# Patient Record
Sex: FEMALE | Race: WHITE | NOT HISPANIC OR LATINO | Employment: OTHER | URBAN - METROPOLITAN AREA
[De-identification: names, ages, dates, MRNs, and addresses within clinical notes are randomized per-mention and may not be internally consistent; named-entity substitution may affect disease eponyms.]

---

## 2023-05-22 LAB
ALANINE AMINOTRANSFERASE (SGPT) (U/L) IN SER/PLAS: 22 U/L (ref 7–45)
ALBUMIN (G/DL) IN SER/PLAS: 4.2 G/DL (ref 3.4–5)
ALKALINE PHOSPHATASE (U/L) IN SER/PLAS: 59 U/L (ref 33–136)
AMYLASE (U/L) IN SER/PLAS: 28 U/L (ref 29–103)
ANION GAP IN SER/PLAS: 12 MMOL/L (ref 10–20)
ASPARTATE AMINOTRANSFERASE (SGOT) (U/L) IN SER/PLAS: 18 U/L (ref 9–39)
BILIRUBIN TOTAL (MG/DL) IN SER/PLAS: 0.3 MG/DL (ref 0–1.2)
CALCIUM (MG/DL) IN SER/PLAS: 9.3 MG/DL (ref 8.6–10.3)
CARBON DIOXIDE, TOTAL (MMOL/L) IN SER/PLAS: 28 MMOL/L (ref 21–32)
CHLORIDE (MMOL/L) IN SER/PLAS: 104 MMOL/L (ref 98–107)
CREATININE (MG/DL) IN SER/PLAS: 0.56 MG/DL (ref 0.5–1.05)
ERYTHROCYTE DISTRIBUTION WIDTH (RATIO) BY AUTOMATED COUNT: 14.6 % (ref 11.5–14.5)
ERYTHROCYTE MEAN CORPUSCULAR HEMOGLOBIN CONCENTRATION (G/DL) BY AUTOMATED: 31.4 G/DL (ref 32–36)
ERYTHROCYTE MEAN CORPUSCULAR VOLUME (FL) BY AUTOMATED COUNT: 95 FL (ref 80–100)
ERYTHROCYTES (10*6/UL) IN BLOOD BY AUTOMATED COUNT: 4.21 X10E12/L (ref 4–5.2)
GFR FEMALE: >90 ML/MIN/1.73M2
GLUCOSE (MG/DL) IN SER/PLAS: 97 MG/DL (ref 74–99)
HEMATOCRIT (%) IN BLOOD BY AUTOMATED COUNT: 40.1 % (ref 36–46)
HEMOGLOBIN (G/DL) IN BLOOD: 12.6 G/DL (ref 12–16)
LEUKOCYTES (10*3/UL) IN BLOOD BY AUTOMATED COUNT: 6.1 X10E9/L (ref 4.4–11.3)
LIPASE (U/L) IN SER/PLAS: 29 U/L (ref 9–82)
PLATELETS (10*3/UL) IN BLOOD AUTOMATED COUNT: 305 X10E9/L (ref 150–450)
POTASSIUM (MMOL/L) IN SER/PLAS: 4.2 MMOL/L (ref 3.5–5.3)
PROTEIN TOTAL: 6.5 G/DL (ref 6.4–8.2)
SODIUM (MMOL/L) IN SER/PLAS: 140 MMOL/L (ref 136–145)
UREA NITROGEN (MG/DL) IN SER/PLAS: 19 MG/DL (ref 6–23)

## 2023-09-03 PROBLEM — E03.9 HYPOTHYROIDISM: Status: ACTIVE | Noted: 2023-09-03

## 2023-09-03 PROBLEM — K62.5 BRBPR (BRIGHT RED BLOOD PER RECTUM): Status: ACTIVE | Noted: 2023-09-03

## 2023-09-03 PROBLEM — D72.829 LEUKOCYTOSIS: Status: ACTIVE | Noted: 2023-09-03

## 2023-09-03 PROBLEM — J45.909 ASTHMA WITHOUT STATUS ASTHMATICUS (HHS-HCC): Status: ACTIVE | Noted: 2023-09-03

## 2023-09-03 PROBLEM — R12 HEARTBURN: Status: ACTIVE | Noted: 2023-09-03

## 2023-09-03 PROBLEM — K21.9 GERD (GASTROESOPHAGEAL REFLUX DISEASE): Status: ACTIVE | Noted: 2023-09-03

## 2023-09-03 PROBLEM — R13.10 DYSPHAGIA: Status: ACTIVE | Noted: 2023-09-03

## 2023-09-03 PROBLEM — D12.6 COLON ADENOMAS: Status: ACTIVE | Noted: 2023-09-03

## 2023-09-03 PROBLEM — E66.3 OVERWEIGHT: Status: ACTIVE | Noted: 2023-09-03

## 2023-09-03 PROBLEM — B37.0 ORAL THRUSH: Status: ACTIVE | Noted: 2023-09-03

## 2023-09-03 PROBLEM — M76.60 ACHILLES TENDONITIS: Status: ACTIVE | Noted: 2023-09-03

## 2023-09-03 PROBLEM — R53.83 FATIGUE: Status: ACTIVE | Noted: 2023-09-03

## 2023-09-03 PROBLEM — R50.9 PYREXIA: Status: ACTIVE | Noted: 2023-09-03

## 2023-09-03 PROBLEM — G47.33 OBSTRUCTIVE SLEEP APNEA SYNDROME: Status: ACTIVE | Noted: 2023-09-03

## 2023-09-03 RX ORDER — IBUPROFEN 200 MG
200 TABLET ORAL EVERY 6 HOURS
COMMUNITY

## 2023-09-03 RX ORDER — DESONIDE 0.5 MG/G
CREAM TOPICAL
COMMUNITY
End: 2024-03-06 | Stop reason: WASHOUT

## 2023-09-03 RX ORDER — ASCORBIC ACID 500 MG
1 TABLET ORAL DAILY
COMMUNITY

## 2023-09-03 RX ORDER — GLUCOSAM/CHONDRO/HERB 149/HYAL 750-100 MG
2 TABLET ORAL DAILY
COMMUNITY
End: 2024-03-06 | Stop reason: WASHOUT

## 2023-09-03 RX ORDER — MAGNESIUM OXIDE/MAG AA CHELATE 300 MG
1 CAPSULE ORAL DAILY
COMMUNITY

## 2023-09-03 RX ORDER — FLUTICASONE PROPIONATE AND SALMETEROL XINAFOATE 115; 21 UG/1; UG/1
2 AEROSOL, METERED RESPIRATORY (INHALATION)
COMMUNITY
End: 2023-11-20 | Stop reason: ALTCHOICE

## 2023-09-03 RX ORDER — MULTIVITAMIN
1 TABLET ORAL DAILY
COMMUNITY
End: 2023-11-20 | Stop reason: ALTCHOICE

## 2023-09-03 RX ORDER — VIT C/E/ZN/COPPR/LUTEIN/ZEAXAN 250MG-90MG
1 CAPSULE ORAL DAILY
COMMUNITY

## 2023-09-03 RX ORDER — ESTRADIOL 0.1 MG/G
2 CREAM VAGINAL
COMMUNITY
End: 2023-11-20 | Stop reason: ALTCHOICE

## 2023-09-03 RX ORDER — FLUTICASONE PROPIONATE 50 MCG
2 SPRAY, SUSPENSION (ML) NASAL DAILY
COMMUNITY

## 2023-09-03 RX ORDER — SELENIUM 50 MCG
1 TABLET ORAL DAILY
COMMUNITY
End: 2023-11-20 | Stop reason: ALTCHOICE

## 2023-09-03 RX ORDER — HYDROGEN PEROXIDE 3 %
1 SOLUTION, NON-ORAL MISCELLANEOUS DAILY
COMMUNITY
End: 2023-11-20 | Stop reason: ALTCHOICE

## 2023-09-03 RX ORDER — ZINC SULFATE 50(220)MG
1 CAPSULE ORAL DAILY
COMMUNITY
End: 2024-03-06 | Stop reason: WASHOUT

## 2023-10-03 ENCOUNTER — APPOINTMENT (OUTPATIENT)
Dept: ALLERGY | Facility: CLINIC | Age: 68
End: 2023-10-03
Payer: MEDICARE

## 2023-10-09 ENCOUNTER — TELEPHONE (OUTPATIENT)
Dept: PRIMARY CARE | Facility: CLINIC | Age: 68
End: 2023-10-09
Payer: MEDICARE

## 2023-10-09 DIAGNOSIS — J40 BRONCHITIS: Primary | ICD-10-CM

## 2023-10-09 RX ORDER — AMOXICILLIN 500 MG/1
500 CAPSULE ORAL EVERY 12 HOURS SCHEDULED
Qty: 14 CAPSULE | Refills: 0 | Status: SHIPPED | OUTPATIENT
Start: 2023-10-09 | End: 2023-11-20 | Stop reason: ALTCHOICE

## 2023-10-09 NOTE — TELEPHONE ENCOUNTER
Went to urgent care in [place] yesterday for chest congestion.  Prescribed doxycycline and steroid.  She is stating that she does not want to take doxycycline and is requesting that doc sends in her a prescription for amoxicillin instead.  She uses [facility] in [place].  Was hoping that Dr Novak would also look over the notes from urgent care.  She is not satisfied with the care she received.

## 2023-10-10 ENCOUNTER — APPOINTMENT (OUTPATIENT)
Dept: PULMONOLOGY | Facility: CLINIC | Age: 68
End: 2023-10-10
Payer: COMMERCIAL

## 2023-10-12 ENCOUNTER — TELEPHONE (OUTPATIENT)
Dept: PRIMARY CARE | Facility: CLINIC | Age: 68
End: 2023-10-12
Payer: MEDICARE

## 2023-10-12 RX ORDER — DOXYCYCLINE 100 MG/1
100 CAPSULE ORAL 2 TIMES DAILY
Qty: 14 CAPSULE | Refills: 0 | Status: SHIPPED | OUTPATIENT
Start: 2023-10-12 | End: 2023-10-19

## 2023-10-12 NOTE — TELEPHONE ENCOUNTER
Patient is calling stating taking amoxicillin a few days ago and she is still not feeling well.  Mucus is turning green.

## 2023-10-17 ENCOUNTER — APPOINTMENT (OUTPATIENT)
Dept: ALLERGY | Facility: CLINIC | Age: 68
End: 2023-10-17
Payer: MEDICARE

## 2023-10-17 ENCOUNTER — OFFICE VISIT (OUTPATIENT)
Dept: PRIMARY CARE | Facility: CLINIC | Age: 68
End: 2023-10-17
Payer: MEDICARE

## 2023-10-17 VITALS
HEART RATE: 90 BPM | SYSTOLIC BLOOD PRESSURE: 141 MMHG | HEIGHT: 63 IN | WEIGHT: 166.4 LBS | TEMPERATURE: 98 F | OXYGEN SATURATION: 97 % | BODY MASS INDEX: 29.48 KG/M2 | DIASTOLIC BLOOD PRESSURE: 84 MMHG

## 2023-10-17 DIAGNOSIS — E78.00 SERUM CHOLESTEROL ELEVATED: ICD-10-CM

## 2023-10-17 DIAGNOSIS — R73.01 ELEVATED FASTING GLUCOSE: ICD-10-CM

## 2023-10-17 DIAGNOSIS — R53.83 FATIGUE, UNSPECIFIED TYPE: Primary | ICD-10-CM

## 2023-10-17 DIAGNOSIS — M85.80 OTHER SPECIFIED DISORDERS OF BONE DENSITY AND STRUCTURE, UNSPECIFIED SITE: ICD-10-CM

## 2023-10-17 DIAGNOSIS — G47.30 SLEEP APNEA IN ADULT: ICD-10-CM

## 2023-10-17 DIAGNOSIS — R05.2 SUBACUTE COUGH: ICD-10-CM

## 2023-10-17 PROCEDURE — 1036F TOBACCO NON-USER: CPT | Performed by: FAMILY MEDICINE

## 2023-10-17 PROCEDURE — 1159F MED LIST DOCD IN RCRD: CPT | Performed by: FAMILY MEDICINE

## 2023-10-17 PROCEDURE — 99214 OFFICE O/P EST MOD 30 MIN: CPT | Performed by: FAMILY MEDICINE

## 2023-10-17 RX ORDER — TRETINOIN 0.5 MG/G
CREAM TOPICAL
COMMUNITY
Start: 2023-08-24

## 2023-10-17 RX ORDER — PANTOPRAZOLE SODIUM 40 MG/1
TABLET, DELAYED RELEASE ORAL
COMMUNITY
End: 2023-10-20

## 2023-10-17 RX ORDER — EPINEPHRINE 0.3 MG/.3ML
INJECTION SUBCUTANEOUS
COMMUNITY
Start: 2023-09-19

## 2023-10-17 RX ORDER — UREA 40 G/100G
LOTION TOPICAL
COMMUNITY
Start: 2023-04-06

## 2023-10-17 ASSESSMENT — ENCOUNTER SYMPTOMS
SINUS PAIN: 0
PALPITATIONS: 0
RHINORRHEA: 0
BLOOD IN STOOL: 1
FREQUENCY: 1
ARTHRALGIAS: 1
ACTIVITY CHANGE: 1
FATIGUE: 1
COUGH: 1
SORE THROAT: 0
JOINT SWELLING: 1
CHILLS: 1
ABDOMINAL PAIN: 0
WHEEZING: 0
FEVER: 0

## 2023-10-17 NOTE — PROGRESS NOTES
Subjective     Patient ID: Jenn Antunez is a 68 y.o. female who presents for Annual Exam (physical), Cough, and Asthma (Respiratory infection went to urgent care has one doxycycline pill left, sees dr prater for allergy shots, bad cough, lots of wheezing, sleeping 9-10  hrs a day, sick for a couple weeks, tired last 6 months).  Cough  Associated symptoms include chills. Pertinent negatives include no ear pain, fever, rhinorrhea, sore throat or wheezing. Her past medical history is significant for asthma and environmental allergies.   Asthma  She complains of cough. There is no wheezing. Pertinent negatives include no ear pain, fever, rhinorrhea, sneezing or sore throat. Her past medical history is significant for asthma.     Recent illness, went to urgent care on 10/9/23.  Did not take the doxycycline and prednisone prescribed to her from urgent care.Followed up with us, given amoxicillin 500mg for bronchitis, which did not relieve her symptoms. Prescribed doxycycline without relief of symptoms.  Coughing violently, phlegm has been yellow. Today has been white. Woke up sore all over. Sleeping 9-10 hours. Feels refreshed when waking up, and then gets tired within an hour. Taking naps for the fatigue. No CXR. Taking ibuprofen for muscle soreness and chills daily.   Followed by pulmonology and alg/imm. Received first allergy shot on 2023 from Dr. Gates. Has had x2 from him. Previously had them for 3 years and helped significantly. Typically only gets ill x1 per year since her allergy shots.  Started as sinus congestion and then went to chest, prone to PNA.   Complaining of 6 months of fatigue. Did recently retire in April this year.Retired in April. Pt has history of AILYN, but does not use CPAP. Explains that the machine is too noisy.     Other complaints:   Bladder and bowel leakage, occurs when sneezing and coughing. A1  1st MCP joint on L more painful in the morning.   Dietician  "referral    Medical History:  Asthma  AILYN without CPAP    Meds  Advair bid  Albuterol prn  Desonide .05% cream  Estradiol 0.1mg/gm cream  Fluticasone 50mcg nasal suspension  PEG oral solution  Potassium  Vitamin C  Vitamin D-3 25mcg  Zinc  Magnesium    Social History:  Smoking -none  Alcohol- x1 glass per month    Review of Systems   Constitutional:  Positive for activity change, chills and fatigue. Negative for fever.   HENT:  Negative for congestion, ear pain, rhinorrhea, sinus pain, sneezing and sore throat.    Respiratory:  Positive for cough. Negative for wheezing.    Cardiovascular:  Negative for palpitations.   Gastrointestinal:  Positive for blood in stool. Negative for abdominal pain.   Genitourinary:  Positive for frequency and urgency.   Musculoskeletal:  Positive for arthralgias and joint swelling.   Allergic/Immunologic: Positive for environmental allergies.   All other systems reviewed and are negative.      Objective     Physical Exam  /84   Pulse 90   Temp 36.7 °C (98 °F)   Ht 1.6 m (5' 3\")   Wt 75.5 kg (166 lb 6.4 oz)   SpO2 97%   BMI 29.48 kg/m²    138/78 in room    General: Pt is sitting up in chair. Appears well-nourished. Coughing in room.  HENT: TM clear bilaterally. No nasal edema or congestion. Oropharynx without erythema or exudate. Mallampati level III.   Neck: No cervical lymphadenopathy. No thyromegaly  CV: S1S2 normal. Regular rate and rhythm. Cap refill <2 seconds.  Resp: Clear to auscultation in all lobes. No rales, rhonchi, or wheezes. No clubbing.   Extremities: No lower extremity edema bilaterally.   Skin: Warm and dry. No rashes or bruising.   Psych: Appropriate responses and actions.     Lab Results   Component Value Date    WBC 6.1 05/22/2023    HGB 12.6 05/22/2023    HCT 40.1 05/22/2023    MCV 95 05/22/2023     05/22/2023      Lab Results   Component Value Date    GLUCOSE 97 05/22/2023    CALCIUM 9.3 05/22/2023     05/22/2023    K 4.2 05/22/2023    CO2 " "28 05/22/2023     05/22/2023    BUN 19 05/22/2023    CREATININE 0.56 05/22/2023     Lab Results   Component Value Date    CHOL 329 (H) 03/21/2023    CHOL 246 (H) 04/05/2022    CHOL 237 (H) 07/29/2021     Lab Results   Component Value Date    HDL 64 03/21/2023    HDL 52 04/05/2022    HDL 54.9 07/29/2021     Lab Results   Component Value Date    LDLCALC 248 (H) 03/21/2023    LDLCALC 168 (H) 04/05/2022    LDLCALC 150 (H) 06/16/2018     Lab Results   Component Value Date    TRIG 86 03/21/2023    TRIG 128 04/05/2022    TRIG 163 (H) 07/29/2021     No components found for: \"CHOLHDL\"     Assessment:  68 y.o. female with a history of asthma presenting for cough and fatigue.  R/o pneumonia  Fatigue workup  History of elevated cholesterol levels and fasting glucose  Obstructive sleep apnea    Plan:  CBC, CXR  TSH, vitamin D  Lipid panel, HbA1c  Steroid pack after blood drawn, continue inhalers  Sleep medicine consult recommended for AILNY. Will follow up with pt about a consult after results obtained.    Lilliam Spann, Student OMS III    I reviewed and examined the patient. I was present for the key exam elements, and I fully participated in the patient's care. I discussed the management of care with the student. I have personally reviewed the pertinent labs and imaging, as well as recent notes, with the patient. I have reviewed the note above and agree with the student's medical decision making as documented. Any adjustments to the plan were altered in the body of the note.     Chantal Em, DO  [facility] Family Medicine PGY-2    "

## 2023-10-18 ENCOUNTER — LAB (OUTPATIENT)
Dept: LAB | Facility: LAB | Age: 68
End: 2023-10-18
Payer: MEDICARE

## 2023-10-18 ENCOUNTER — HOSPITAL ENCOUNTER (OUTPATIENT)
Dept: RADIOLOGY | Facility: HOSPITAL | Age: 68
Discharge: HOME | End: 2023-10-18
Payer: MEDICARE

## 2023-10-18 DIAGNOSIS — R05.2 SUBACUTE COUGH: ICD-10-CM

## 2023-10-18 DIAGNOSIS — M85.80 OTHER SPECIFIED DISORDERS OF BONE DENSITY AND STRUCTURE, UNSPECIFIED SITE: ICD-10-CM

## 2023-10-18 DIAGNOSIS — E78.00 SERUM CHOLESTEROL ELEVATED: ICD-10-CM

## 2023-10-18 DIAGNOSIS — R53.83 FATIGUE, UNSPECIFIED TYPE: ICD-10-CM

## 2023-10-18 DIAGNOSIS — R73.01 ELEVATED FASTING GLUCOSE: ICD-10-CM

## 2023-10-18 LAB
25(OH)D3 SERPL-MCNC: 47 NG/ML (ref 30–100)
CHOLEST SERPL-MCNC: 181 MG/DL (ref 0–199)
CHOLESTEROL/HDL RATIO: 4
ERYTHROCYTE [DISTWIDTH] IN BLOOD BY AUTOMATED COUNT: 14 % (ref 11.5–14.5)
EST. AVERAGE GLUCOSE BLD GHB EST-MCNC: 117 MG/DL
HBA1C MFR BLD: 5.7 %
HCT VFR BLD AUTO: 39.1 % (ref 36–46)
HDLC SERPL-MCNC: 45.1 MG/DL
HGB BLD-MCNC: 12.6 G/DL (ref 12–16)
LDLC SERPL CALC-MCNC: 108 MG/DL
MCH RBC QN AUTO: 30.2 PG (ref 26–34)
MCHC RBC AUTO-ENTMCNC: 32.2 G/DL (ref 32–36)
MCV RBC AUTO: 94 FL (ref 80–100)
NON HDL CHOLESTEROL: 136 MG/DL (ref 0–149)
NRBC BLD-RTO: 0 /100 WBCS (ref 0–0)
PLATELET # BLD AUTO: 279 X10*3/UL (ref 150–450)
PMV BLD AUTO: 11.3 FL (ref 7.5–11.5)
RBC # BLD AUTO: 4.17 X10*6/UL (ref 4–5.2)
TRIGL SERPL-MCNC: 139 MG/DL (ref 0–149)
TSH SERPL-ACNC: 1.73 MIU/L (ref 0.44–3.98)
VLDL: 28 MG/DL (ref 0–40)
WBC # BLD AUTO: 6.5 X10*3/UL (ref 4.4–11.3)

## 2023-10-18 PROCEDURE — 84443 ASSAY THYROID STIM HORMONE: CPT

## 2023-10-18 PROCEDURE — 95165 ANTIGEN THERAPY SERVICES: CPT | Performed by: ALLERGY & IMMUNOLOGY

## 2023-10-18 PROCEDURE — 82306 VITAMIN D 25 HYDROXY: CPT

## 2023-10-18 PROCEDURE — 80061 LIPID PANEL: CPT

## 2023-10-18 PROCEDURE — 71046 X-RAY EXAM CHEST 2 VIEWS: CPT | Performed by: RADIOLOGY

## 2023-10-18 PROCEDURE — 36415 COLL VENOUS BLD VENIPUNCTURE: CPT

## 2023-10-18 PROCEDURE — 71046 X-RAY EXAM CHEST 2 VIEWS: CPT | Mod: FY

## 2023-10-18 PROCEDURE — 83036 HEMOGLOBIN GLYCOSYLATED A1C: CPT

## 2023-10-18 PROCEDURE — 85027 COMPLETE CBC AUTOMATED: CPT

## 2023-10-18 NOTE — PROGRESS NOTES
I reviewed and examined the patient. I was present for the key exam elements, and I fully participated in the patient's care. I discussed the management of the care with the resident. I have personally reviewed the pertinent labs and imaging, as well as recent notes, with the patient. I have reviewed the note above and agree with the resident's medical decision making as documented in the resident's note, in addition to the following comments / findings:     Agree with the rest of the plan outlined below by resident physician. No red flags.      The patient understands and agrees to the assessment and plan of care. Patient has also agreed to follow up and comply with the treatment and evaluation as recommended today. Patient was instructed to call the office at [phone] should questions arise regarding their treatment or care.     Devin Novak DO, FAOASM  Family Medicine   [address], [address]  [place] [address]     Devin Novak DO

## 2023-10-20 ENCOUNTER — TELEPHONE (OUTPATIENT)
Dept: PRIMARY CARE | Facility: CLINIC | Age: 68
End: 2023-10-20
Payer: MEDICARE

## 2023-10-20 DIAGNOSIS — K21.9 GASTROESOPHAGEAL REFLUX DISEASE, UNSPECIFIED WHETHER ESOPHAGITIS PRESENT: Primary | ICD-10-CM

## 2023-10-20 RX ORDER — PANTOPRAZOLE SODIUM 40 MG/1
40 TABLET, DELAYED RELEASE ORAL DAILY
Qty: 90 TABLET | Refills: 1 | Status: SHIPPED | OUTPATIENT
Start: 2023-10-20 | End: 2024-04-17

## 2023-10-20 RX ORDER — PANTOPRAZOLE SODIUM 40 MG/1
TABLET, DELAYED RELEASE ORAL
Status: CANCELLED | OUTPATIENT
Start: 2023-10-20

## 2023-10-20 NOTE — TELEPHONE ENCOUNTER
Patient called and stated that she saw the results for her lab work.  She saw that her triglycerides were high and she stated that she was going to try to work on that with her diet.  She did ask that we look over the results and if we could give her a call back and let her know if there is anything else that she should be concerned about.    She also asked about the results from her x-ray but from what I could see the results are not posted yet.

## 2023-10-24 ENCOUNTER — CLINICAL SUPPORT (OUTPATIENT)
Dept: ALLERGY | Facility: CLINIC | Age: 68
End: 2023-10-24
Payer: MEDICARE

## 2023-10-24 ENCOUNTER — APPOINTMENT (OUTPATIENT)
Dept: ALLERGY | Facility: CLINIC | Age: 68
End: 2023-10-24
Payer: MEDICARE

## 2023-10-24 DIAGNOSIS — J30.81 ALLERGIC RHINITIS DUE TO ANIMAL (CAT) (DOG) HAIR AND DANDER: ICD-10-CM

## 2023-10-24 DIAGNOSIS — J30.9 ALLERGIC RHINITIS, UNSPECIFIED SEASONALITY, UNSPECIFIED TRIGGER: ICD-10-CM

## 2023-10-24 DIAGNOSIS — J30.89 NON-SEASONAL ALLERGIC RHINITIS, UNSPECIFIED TRIGGER: ICD-10-CM

## 2023-10-24 PROCEDURE — 95117 IMMUNOTHERAPY INJECTIONS: CPT | Performed by: ALLERGY & IMMUNOLOGY

## 2023-10-30 ENCOUNTER — CLINICAL SUPPORT (OUTPATIENT)
Dept: ALLERGY | Facility: CLINIC | Age: 68
End: 2023-10-30
Payer: MEDICARE

## 2023-10-30 DIAGNOSIS — J30.81 ALLERGIC RHINITIS DUE TO ANIMAL (CAT) (DOG) HAIR AND DANDER: ICD-10-CM

## 2023-10-30 DIAGNOSIS — J30.9 ALLERGIC RHINITIS, UNSPECIFIED SEASONALITY, UNSPECIFIED TRIGGER: ICD-10-CM

## 2023-10-30 DIAGNOSIS — J30.89 NON-SEASONAL ALLERGIC RHINITIS, UNSPECIFIED TRIGGER: ICD-10-CM

## 2023-10-30 PROCEDURE — 95117 IMMUNOTHERAPY INJECTIONS: CPT | Performed by: ALLERGY & IMMUNOLOGY

## 2023-11-01 ENCOUNTER — TELEPHONE (OUTPATIENT)
Dept: PRIMARY CARE | Facility: CLINIC | Age: 68
End: 2023-11-01
Payer: MEDICARE

## 2023-11-01 DIAGNOSIS — J40 BRONCHITIS: Primary | ICD-10-CM

## 2023-11-01 RX ORDER — DOXYCYCLINE 100 MG/1
100 CAPSULE ORAL 2 TIMES DAILY
Qty: 14 CAPSULE | Refills: 0 | Status: SHIPPED | OUTPATIENT
Start: 2023-11-01 | End: 2023-11-08

## 2023-11-01 NOTE — TELEPHONE ENCOUNTER
Was feeling a little better.  But now feels bad again.  Cough bringing up green. Bad sore throat.  [facility]

## 2023-11-07 PROCEDURE — 95165 ANTIGEN THERAPY SERVICES: CPT | Performed by: ALLERGY & IMMUNOLOGY

## 2023-11-10 ENCOUNTER — CLINICAL SUPPORT (OUTPATIENT)
Dept: ALLERGY | Facility: CLINIC | Age: 68
End: 2023-11-10
Payer: MEDICARE

## 2023-11-10 DIAGNOSIS — J30.9 ALLERGIC RHINITIS, UNSPECIFIED SEASONALITY, UNSPECIFIED TRIGGER: ICD-10-CM

## 2023-11-10 DIAGNOSIS — J30.89 NON-SEASONAL ALLERGIC RHINITIS, UNSPECIFIED TRIGGER: ICD-10-CM

## 2023-11-10 DIAGNOSIS — J30.81 ALLERGIC RHINITIS DUE TO ANIMAL (CAT) (DOG) HAIR AND DANDER: ICD-10-CM

## 2023-11-10 PROCEDURE — 95117 IMMUNOTHERAPY INJECTIONS: CPT | Performed by: ALLERGY & IMMUNOLOGY

## 2023-11-14 ENCOUNTER — CLINICAL SUPPORT (OUTPATIENT)
Dept: ALLERGY | Facility: CLINIC | Age: 68
End: 2023-11-14
Payer: MEDICARE

## 2023-11-14 DIAGNOSIS — J30.89 NON-SEASONAL ALLERGIC RHINITIS, UNSPECIFIED TRIGGER: ICD-10-CM

## 2023-11-14 DIAGNOSIS — J30.9 ALLERGIC RHINITIS, UNSPECIFIED SEASONALITY, UNSPECIFIED TRIGGER: ICD-10-CM

## 2023-11-14 DIAGNOSIS — J30.81 ALLERGIC RHINITIS DUE TO ANIMAL (CAT) (DOG) HAIR AND DANDER: ICD-10-CM

## 2023-11-14 PROCEDURE — 95117 IMMUNOTHERAPY INJECTIONS: CPT | Performed by: ALLERGY & IMMUNOLOGY

## 2023-11-16 ENCOUNTER — TELEPHONE (OUTPATIENT)
Dept: PRIMARY CARE | Facility: CLINIC | Age: 68
End: 2023-11-16
Payer: MEDICARE

## 2023-11-16 NOTE — TELEPHONE ENCOUNTER
Having a lot of phlegm (green).  Finished antibiotic one week ago.  What next?  She has been sick for the past couple months.

## 2023-11-17 ENCOUNTER — APPOINTMENT (OUTPATIENT)
Dept: ALLERGY | Facility: CLINIC | Age: 68
End: 2023-11-17
Payer: MEDICARE

## 2023-11-20 ENCOUNTER — OFFICE VISIT (OUTPATIENT)
Dept: PRIMARY CARE | Facility: CLINIC | Age: 68
End: 2023-11-20
Payer: MEDICARE

## 2023-11-20 VITALS
HEIGHT: 63 IN | WEIGHT: 156 LBS | OXYGEN SATURATION: 96 % | BODY MASS INDEX: 27.64 KG/M2 | TEMPERATURE: 97.9 F | HEART RATE: 81 BPM | SYSTOLIC BLOOD PRESSURE: 124 MMHG | DIASTOLIC BLOOD PRESSURE: 77 MMHG

## 2023-11-20 DIAGNOSIS — J01.90 ACUTE RHINOSINUSITIS: ICD-10-CM

## 2023-11-20 DIAGNOSIS — B37.0 ORAL THRUSH: Primary | ICD-10-CM

## 2023-11-20 PROBLEM — H40.9 GLAUCOMA: Status: ACTIVE | Noted: 2022-10-13

## 2023-11-20 PROBLEM — R09.1 PLEURISY: Status: ACTIVE | Noted: 2023-11-20

## 2023-11-20 PROBLEM — C44.321 SQUAMOUS CELL CARCINOMA OF SKIN OF NOSE: Status: ACTIVE | Noted: 2022-09-07

## 2023-11-20 PROBLEM — E78.5 HLD (HYPERLIPIDEMIA): Status: ACTIVE | Noted: 2022-10-13

## 2023-11-20 PROBLEM — E55.9 VITAMIN D DEFICIENCY: Status: ACTIVE | Noted: 2018-04-23

## 2023-11-20 PROBLEM — E78.00 HYPERCHOLESTEROLEMIA: Status: ACTIVE | Noted: 2018-04-23

## 2023-11-20 PROCEDURE — 99214 OFFICE O/P EST MOD 30 MIN: CPT | Performed by: FAMILY MEDICINE

## 2023-11-20 PROCEDURE — 1159F MED LIST DOCD IN RCRD: CPT | Performed by: FAMILY MEDICINE

## 2023-11-20 PROCEDURE — 1036F TOBACCO NON-USER: CPT | Performed by: FAMILY MEDICINE

## 2023-11-20 RX ORDER — CLOTRIMAZOLE 10 MG/1
10 LOZENGE ORAL; TOPICAL
Qty: 50 TABLET | Refills: 0 | Status: SHIPPED | OUTPATIENT
Start: 2023-11-20 | End: 2023-11-30

## 2023-11-20 RX ORDER — CEFDINIR 300 MG/1
300 CAPSULE ORAL 2 TIMES DAILY
Qty: 14 CAPSULE | Refills: 0 | Status: SHIPPED | OUTPATIENT
Start: 2023-11-20 | End: 2023-11-27

## 2023-11-20 RX ORDER — PREDNISONE 10 MG/1
TABLET ORAL
Qty: 23 TABLET | Refills: 0 | Status: SHIPPED | OUTPATIENT
Start: 2023-11-20 | End: 2023-11-20

## 2023-11-20 ASSESSMENT — ENCOUNTER SYMPTOMS
FATIGUE: 1
LIGHT-HEADEDNESS: 0
DYSPHORIC MOOD: 0
PALPITATIONS: 0
FEVER: 1
SHORTNESS OF BREATH: 0
VOMITING: 1
NAUSEA: 0
DIARRHEA: 0
SINUS PAIN: 1
CONSTIPATION: 0
RHINORRHEA: 1
DYSURIA: 0
COUGH: 1
SINUS PRESSURE: 1
WHEEZING: 0
WOUND: 0
FREQUENCY: 0
HEADACHES: 1
ARTHRALGIAS: 0
NERVOUS/ANXIOUS: 0
DIZZINESS: 0
MYALGIAS: 0
CHILLS: 0

## 2023-11-20 NOTE — PROGRESS NOTES
"Subjective   Patient ID: Jenn Antunez is a 68 y.o. female with past medical history of who presents for Cough (Has had cough for 2 months), Vomiting (White coating on tongue makes her throw up every morning also hasn't eaten nothing tastes good when she eats she throws it back up.), and Sinus Problem (Sinus pressure is new along with everything else written).    Patient presents today with a chief complaint of ongoing URI symptoms for past 2 months.     Patient reporting she has been having coughing, congestion, rhinorrhea throughout.  Has had some occasional fevers.  For the past 5 days, her congestion has been worsening and she has developed sinus pain and headaches.        Review of Systems   Constitutional:  Positive for fatigue and fever. Negative for chills.   HENT:  Positive for congestion, rhinorrhea, sinus pressure and sinus pain.         Plaques on tongue.    Respiratory:  Positive for cough. Negative for shortness of breath and wheezing.    Cardiovascular:  Negative for chest pain, palpitations and leg swelling.   Gastrointestinal:  Positive for vomiting. Negative for constipation, diarrhea and nausea.   Genitourinary:  Negative for dysuria, frequency and urgency.   Musculoskeletal:  Negative for arthralgias and myalgias.   Skin:  Negative for pallor, rash and wound.   Neurological:  Positive for headaches. Negative for dizziness and light-headedness.   Psychiatric/Behavioral:  Negative for dysphoric mood. The patient is not nervous/anxious.        Objective     Visit Vitals  /77   Pulse 81   Temp 36.6 °C (97.9 °F)   Ht 1.6 m (5' 3\")   Wt 70.8 kg (156 lb)   SpO2 96%   BMI 27.63 kg/m²   Smoking Status Never   BSA 1.77 m²         Physical Exam  Constitutional:       Appearance: Normal appearance.   HENT:      Head: Normocephalic and atraumatic.      Right Ear: Tympanic membrane, ear canal and external ear normal.      Left Ear: Tympanic membrane, ear canal and external ear normal.      Nose: " Congestion and rhinorrhea present.      Right Turbinates: Enlarged and swollen.      Left Turbinates: Enlarged and swollen.      Right Sinus: Maxillary sinus tenderness and frontal sinus tenderness present.      Left Sinus: Maxillary sinus tenderness and frontal sinus tenderness present.      Mouth/Throat:      Mouth: Mucous membranes are moist.      Pharynx: Oropharynx is clear.   Eyes:      Extraocular Movements: Extraocular movements intact.      Conjunctiva/sclera: Conjunctivae normal.   Cardiovascular:      Rate and Rhythm: Normal rate and regular rhythm.      Pulses: Normal pulses.      Heart sounds: Normal heart sounds.   Pulmonary:      Effort: Pulmonary effort is normal.      Breath sounds: Normal breath sounds.   Abdominal:      General: There is no distension.      Palpations: Abdomen is soft.      Tenderness: There is no abdominal tenderness.   Skin:     General: Skin is warm and dry.      Capillary Refill: Capillary refill takes less than 2 seconds.   Neurological:      General: No focal deficit present.      Mental Status: She is alert and oriented to person, place, and time.   Psychiatric:         Mood and Affect: Mood normal.         Behavior: Behavior normal.         Assessment/Plan   Problem List Items Addressed This Visit       Oral thrush - Primary    Relevant Medications    clotrimazole (Mycelex) 10 mg claire     Other Visit Diagnoses       Acute rhinosinusitis        Relevant Medications    cefdinir (Omnicef) 300 mg capsule        Provided information for Dr. Martino, pulmonology, to further investigate the etiology of this chronic cough.  Patient appears to have developed oral thrush at this time, prescribed clotrimazole lozenges.      Patient seen and discussed with attending physician, Dr Kishore Saravia, DO PGY3  Family Medicine

## 2023-11-20 NOTE — PROGRESS NOTES
I reviewed and examined the patient. I was present for the key exam elements, and I fully participated in the patient's care. I discussed the management of the care with the resident. I have personally reviewed the pertinent labs and imaging, as well as recent notes, with the patient. I have reviewed the note above and agree with the resident's medical decision making as documented in the resident's note, in addition to the following comments / findings:     Agree with the rest of the plan outlined below by resident physician. No red flags.      The patient understands and agrees to the assessment and plan of care. Patient has also agreed to follow up and comply with the treatment and evaluation as recommended today. Patient was instructed to call the office at [phone] should questions arise regarding their treatment or care.     Devin Novak DO, FAOASM  Family Medicine   [address], [address]  [place] [address]     Devin Novak DO

## 2023-11-21 ENCOUNTER — APPOINTMENT (OUTPATIENT)
Dept: ALLERGY | Facility: CLINIC | Age: 68
End: 2023-11-21
Payer: MEDICARE

## 2023-11-22 ENCOUNTER — TELEPHONE (OUTPATIENT)
Dept: PRIMARY CARE | Facility: CLINIC | Age: 68
End: 2023-11-22
Payer: MEDICARE

## 2023-11-22 DIAGNOSIS — J40 BRONCHITIS: Primary | ICD-10-CM

## 2023-11-22 RX ORDER — AMOXICILLIN 500 MG/1
500 CAPSULE ORAL EVERY 12 HOURS SCHEDULED
Qty: 14 CAPSULE | Refills: 0 | Status: SHIPPED | OUTPATIENT
Start: 2023-11-22 | End: 2023-12-11 | Stop reason: ALTCHOICE

## 2023-11-22 NOTE — TELEPHONE ENCOUNTER
Patient states that she can't take Cefdinor. It has been causing nausea, vomiting, stomach cramps. Patient requesting a new prescription, she states hat she does well with amoxicillin.

## 2023-12-01 ENCOUNTER — CLINICAL SUPPORT (OUTPATIENT)
Dept: ALLERGY | Facility: CLINIC | Age: 68
End: 2023-12-01
Payer: MEDICARE

## 2023-12-01 DIAGNOSIS — J30.9 ALLERGIC RHINITIS, UNSPECIFIED SEASONALITY, UNSPECIFIED TRIGGER: ICD-10-CM

## 2023-12-01 DIAGNOSIS — J30.89 NON-SEASONAL ALLERGIC RHINITIS, UNSPECIFIED TRIGGER: ICD-10-CM

## 2023-12-01 DIAGNOSIS — J30.81 ALLERGIC RHINITIS DUE TO ANIMAL (CAT) (DOG) HAIR AND DANDER: ICD-10-CM

## 2023-12-01 PROCEDURE — 95117 IMMUNOTHERAPY INJECTIONS: CPT | Performed by: ALLERGY & IMMUNOLOGY

## 2023-12-06 ENCOUNTER — APPOINTMENT (OUTPATIENT)
Dept: PULMONOLOGY | Facility: CLINIC | Age: 68
End: 2023-12-06
Payer: MEDICARE

## 2023-12-08 ENCOUNTER — CLINICAL SUPPORT (OUTPATIENT)
Dept: ALLERGY | Facility: CLINIC | Age: 68
End: 2023-12-08
Payer: MEDICARE

## 2023-12-08 DIAGNOSIS — J30.89 NON-SEASONAL ALLERGIC RHINITIS, UNSPECIFIED TRIGGER: ICD-10-CM

## 2023-12-08 DIAGNOSIS — J30.81 ALLERGIC RHINITIS DUE TO ANIMAL (CAT) (DOG) HAIR AND DANDER: ICD-10-CM

## 2023-12-08 DIAGNOSIS — J30.9 ALLERGIC RHINITIS, UNSPECIFIED SEASONALITY, UNSPECIFIED TRIGGER: ICD-10-CM

## 2023-12-08 PROCEDURE — 95117 IMMUNOTHERAPY INJECTIONS: CPT | Performed by: ALLERGY & IMMUNOLOGY

## 2023-12-11 ENCOUNTER — OFFICE VISIT (OUTPATIENT)
Dept: PULMONOLOGY | Facility: CLINIC | Age: 68
End: 2023-12-11
Payer: MEDICARE

## 2023-12-11 ENCOUNTER — APPOINTMENT (OUTPATIENT)
Dept: PULMONOLOGY | Facility: CLINIC | Age: 68
End: 2023-12-11
Payer: MEDICARE

## 2023-12-11 VITALS
DIASTOLIC BLOOD PRESSURE: 72 MMHG | SYSTOLIC BLOOD PRESSURE: 110 MMHG | OXYGEN SATURATION: 94 % | HEART RATE: 85 BPM | WEIGHT: 159.2 LBS | BODY MASS INDEX: 28.2 KG/M2

## 2023-12-11 DIAGNOSIS — J45.909 ASTHMA WITHOUT STATUS ASTHMATICUS WITHOUT COMPLICATION, UNSPECIFIED ASTHMA SEVERITY, UNSPECIFIED WHETHER PERSISTENT (HHS-HCC): Primary | ICD-10-CM

## 2023-12-11 DIAGNOSIS — J40 BRONCHITIS: ICD-10-CM

## 2023-12-11 PROCEDURE — 1160F RVW MEDS BY RX/DR IN RCRD: CPT | Performed by: PEDIATRICS

## 2023-12-11 PROCEDURE — 1159F MED LIST DOCD IN RCRD: CPT | Performed by: PEDIATRICS

## 2023-12-11 PROCEDURE — 99215 OFFICE O/P EST HI 40 MIN: CPT | Performed by: PEDIATRICS

## 2023-12-11 PROCEDURE — 1036F TOBACCO NON-USER: CPT | Performed by: PEDIATRICS

## 2023-12-11 RX ORDER — PREDNISONE 20 MG/1
40 TABLET ORAL DAILY
Qty: 10 TABLET | Refills: 0 | Status: SHIPPED | OUTPATIENT
Start: 2023-12-11 | End: 2023-12-16

## 2023-12-11 RX ORDER — FLUTICASONE PROPIONATE AND SALMETEROL 250; 50 UG/1; UG/1
1 POWDER RESPIRATORY (INHALATION)
Qty: 1 EACH | Refills: 11 | Status: SHIPPED | OUTPATIENT
Start: 2023-12-11 | End: 2024-03-18

## 2023-12-11 NOTE — PROGRESS NOTES
Subjective   Patient ID: Jenn Antunez is a 68 y.o. female who presents for asthma    HPI    Ms Antunez has had 2 episodes of bronchitis treated with antibiotics ans steroids.  She is doing better now but still has cough with clear to white phlegm (was yellow and much more copious).  She has been getting allergy shots as well.  She has not been using advair as the cost was very high but then was told it would be less expensive in December (she has not picked it up yet).      Previous note 4/11/2023:  Ms Antunez is doing well, she is using advair and rarely needs albuterol.    previous note 1/3/2023: Ms Antunez had a PFT done which shows mild obstruction with bronchodilator response, hyperinflation and air trapping. It is similar to spirometry done in office in 2010    previous note 11/15/2022: Ms Antunez is a 67yr old that was told several years ago she has asthma but more recently her allergist told her she does not and all her issues are allergy related.  She states she gets a raspy voice with her breathing occasional, and had worse symptoms after a shoulder surgery recently. She does not use any inhalers.    She also has sleep apnea but does not use her PAP machine    She never smoked     Review of Systems    See scanned documents attached to this note for review of systems, and appropriate scales/scores for this visit.     Objective   Physical Exam  Constitutional:       Appearance: Normal appearance.   HENT:      Head: Normocephalic and atraumatic.      Mouth/Throat:      Pharynx: Oropharynx is clear.   Cardiovascular:      Rate and Rhythm: Normal rate and regular rhythm.      Pulses: Normal pulses.      Heart sounds: Normal heart sounds.   Pulmonary:      Effort: Pulmonary effort is normal.      Breath sounds: Normal breath sounds. No wheezing, rhonchi or rales.   Abdominal:      General: Bowel sounds are normal.      Palpations: Abdomen is soft.   Musculoskeletal:         General: Normal range of motion.    Skin:     General: Skin is warm and dry.   Neurological:      General: No focal deficit present.      Mental Status: She is alert and oriented to person, place, and time.   Psychiatric:         Mood and Affect: Mood normal.             Assessment/Plan     68yr old with allergic rhinitis and asthma      1. rhinitis:  - f/u with allergy as scheduled    2. asthma: PFT shows hyperinflation, air trapping and mild obstruction  - continue advair and albuterol new rx for advair sent  - will give prednisone 40mg/day x 5 days      f/u 2 months       Carlitos Wen MD 12/11/23 11:04 AM

## 2023-12-12 PROCEDURE — 95165 ANTIGEN THERAPY SERVICES: CPT | Performed by: ALLERGY & IMMUNOLOGY

## 2023-12-15 ENCOUNTER — CLINICAL SUPPORT (OUTPATIENT)
Dept: ALLERGY | Facility: CLINIC | Age: 68
End: 2023-12-15
Payer: MEDICARE

## 2023-12-15 DIAGNOSIS — J30.9 ALLERGIC RHINITIS, UNSPECIFIED SEASONALITY, UNSPECIFIED TRIGGER: ICD-10-CM

## 2023-12-15 DIAGNOSIS — J30.81 ALLERGIC RHINITIS DUE TO ANIMAL (CAT) (DOG) HAIR AND DANDER: ICD-10-CM

## 2023-12-15 DIAGNOSIS — J30.89 NON-SEASONAL ALLERGIC RHINITIS, UNSPECIFIED TRIGGER: ICD-10-CM

## 2023-12-15 PROCEDURE — 95117 IMMUNOTHERAPY INJECTIONS: CPT | Performed by: ALLERGY & IMMUNOLOGY

## 2023-12-19 ENCOUNTER — TELEPHONE (OUTPATIENT)
Dept: PRIMARY CARE | Facility: CLINIC | Age: 68
End: 2023-12-19
Payer: MEDICARE

## 2023-12-19 DIAGNOSIS — R10.13 EPIGASTRIC PAIN: Primary | ICD-10-CM

## 2023-12-19 NOTE — TELEPHONE ENCOUNTER
Jenn called asking if you would place an order for an upper scope.  She stated that even with the medication that she is taking she is having symptoms.

## 2023-12-19 NOTE — TELEPHONE ENCOUNTER
Called patient to let her know that the order is in the system.  So long as her doctor is in  they will be able to see the order. Left voicemail

## 2024-01-05 ENCOUNTER — CLINICAL SUPPORT (OUTPATIENT)
Dept: ALLERGY | Facility: CLINIC | Age: 69
End: 2024-01-05
Payer: MEDICARE

## 2024-01-05 DIAGNOSIS — J30.1 ALLERGIC RHINITIS DUE TO POLLEN, UNSPECIFIED SEASONALITY: ICD-10-CM

## 2024-01-05 DIAGNOSIS — J30.81 ALLERGIC RHINITIS DUE TO ANIMAL (CAT) (DOG) HAIR AND DANDER: ICD-10-CM

## 2024-01-05 DIAGNOSIS — J30.89 NON-SEASONAL ALLERGIC RHINITIS, UNSPECIFIED TRIGGER: ICD-10-CM

## 2024-01-05 PROCEDURE — 95117 IMMUNOTHERAPY INJECTIONS: CPT | Performed by: ALLERGY & IMMUNOLOGY

## 2024-01-12 ENCOUNTER — CLINICAL SUPPORT (OUTPATIENT)
Dept: ALLERGY | Facility: CLINIC | Age: 69
End: 2024-01-12
Payer: MEDICARE

## 2024-01-12 DIAGNOSIS — J30.1 ALLERGIC RHINITIS DUE TO POLLEN, UNSPECIFIED SEASONALITY: ICD-10-CM

## 2024-01-12 DIAGNOSIS — J30.81 ALLERGIC RHINITIS DUE TO ANIMAL (CAT) (DOG) HAIR AND DANDER: ICD-10-CM

## 2024-01-12 DIAGNOSIS — J30.89 NON-SEASONAL ALLERGIC RHINITIS, UNSPECIFIED TRIGGER: ICD-10-CM

## 2024-01-12 PROCEDURE — 95117 IMMUNOTHERAPY INJECTIONS: CPT | Performed by: ALLERGY & IMMUNOLOGY

## 2024-01-19 ENCOUNTER — CLINICAL SUPPORT (OUTPATIENT)
Dept: ALLERGY | Facility: CLINIC | Age: 69
End: 2024-01-19

## 2024-01-19 DIAGNOSIS — J30.89 NON-SEASONAL ALLERGIC RHINITIS, UNSPECIFIED TRIGGER: ICD-10-CM

## 2024-01-19 DIAGNOSIS — J30.81 ALLERGIC RHINITIS DUE TO ANIMAL (CAT) (DOG) HAIR AND DANDER: ICD-10-CM

## 2024-01-19 DIAGNOSIS — J30.1 ALLERGIC RHINITIS DUE TO POLLEN, UNSPECIFIED SEASONALITY: ICD-10-CM

## 2024-01-19 PROCEDURE — 95117 IMMUNOTHERAPY INJECTIONS: CPT | Performed by: ALLERGY & IMMUNOLOGY

## 2024-01-23 ENCOUNTER — CLINICAL SUPPORT (OUTPATIENT)
Dept: ALLERGY | Facility: CLINIC | Age: 69
End: 2024-01-23
Payer: MEDICARE

## 2024-01-23 DIAGNOSIS — J30.81 ALLERGIC RHINITIS DUE TO ANIMAL (CAT) (DOG) HAIR AND DANDER: ICD-10-CM

## 2024-01-23 DIAGNOSIS — J30.1 ALLERGIC RHINITIS DUE TO POLLEN, UNSPECIFIED SEASONALITY: ICD-10-CM

## 2024-01-23 DIAGNOSIS — J30.89 NON-SEASONAL ALLERGIC RHINITIS, UNSPECIFIED TRIGGER: ICD-10-CM

## 2024-01-23 PROCEDURE — 95117 IMMUNOTHERAPY INJECTIONS: CPT | Performed by: ALLERGY & IMMUNOLOGY

## 2024-01-26 ENCOUNTER — CLINICAL SUPPORT (OUTPATIENT)
Dept: ALLERGY | Facility: CLINIC | Age: 69
End: 2024-01-26
Payer: MEDICARE

## 2024-01-26 DIAGNOSIS — J30.1 ALLERGIC RHINITIS DUE TO POLLEN, UNSPECIFIED SEASONALITY: ICD-10-CM

## 2024-01-26 DIAGNOSIS — J30.89 NON-SEASONAL ALLERGIC RHINITIS, UNSPECIFIED TRIGGER: ICD-10-CM

## 2024-01-26 DIAGNOSIS — J30.81 ALLERGIC RHINITIS DUE TO ANIMAL (CAT) (DOG) HAIR AND DANDER: ICD-10-CM

## 2024-01-26 PROCEDURE — 95117 IMMUNOTHERAPY INJECTIONS: CPT | Performed by: ALLERGY & IMMUNOLOGY

## 2024-01-29 ENCOUNTER — CLINICAL SUPPORT (OUTPATIENT)
Dept: ALLERGY | Facility: CLINIC | Age: 69
End: 2024-01-29
Payer: MEDICARE

## 2024-01-29 DIAGNOSIS — J30.81 ALLERGIC RHINITIS DUE TO ANIMAL (CAT) (DOG) HAIR AND DANDER: ICD-10-CM

## 2024-01-29 DIAGNOSIS — J45.909 ASTHMA WITHOUT STATUS ASTHMATICUS WITHOUT COMPLICATION, UNSPECIFIED ASTHMA SEVERITY, UNSPECIFIED WHETHER PERSISTENT (HHS-HCC): Primary | ICD-10-CM

## 2024-01-29 DIAGNOSIS — J30.89 NON-SEASONAL ALLERGIC RHINITIS, UNSPECIFIED TRIGGER: ICD-10-CM

## 2024-01-29 DIAGNOSIS — J30.1 ALLERGIC RHINITIS DUE TO POLLEN, UNSPECIFIED SEASONALITY: ICD-10-CM

## 2024-01-29 PROCEDURE — 95117 IMMUNOTHERAPY INJECTIONS: CPT | Performed by: ALLERGY & IMMUNOLOGY

## 2024-01-29 RX ORDER — FLUTICASONE FUROATE AND VILANTEROL 200; 25 UG/1; UG/1
1 POWDER RESPIRATORY (INHALATION) DAILY
Qty: 1 EACH | Refills: 5 | Status: SHIPPED | OUTPATIENT
Start: 2024-01-29 | End: 2024-03-06 | Stop reason: WASHOUT

## 2024-01-31 ENCOUNTER — OFFICE VISIT (OUTPATIENT)
Dept: GASTROENTEROLOGY | Facility: EXTERNAL LOCATION | Age: 69
End: 2024-01-31
Payer: MEDICARE

## 2024-01-31 DIAGNOSIS — R13.10 DYSPHAGIA, UNSPECIFIED TYPE: ICD-10-CM

## 2024-01-31 DIAGNOSIS — R10.13 EPIGASTRIC PAIN: Primary | ICD-10-CM

## 2024-01-31 DIAGNOSIS — K21.9 GASTROESOPHAGEAL REFLUX DISEASE WITHOUT ESOPHAGITIS: ICD-10-CM

## 2024-01-31 PROCEDURE — 88305 TISSUE EXAM BY PATHOLOGIST: CPT | Performed by: PATHOLOGY

## 2024-01-31 PROCEDURE — 43239 EGD BIOPSY SINGLE/MULTIPLE: CPT | Performed by: INTERNAL MEDICINE

## 2024-01-31 PROCEDURE — 1036F TOBACCO NON-USER: CPT | Performed by: INTERNAL MEDICINE

## 2024-01-31 PROCEDURE — 0753T DGTZ GLS MCRSCP SLD LEVEL IV: CPT

## 2024-01-31 PROCEDURE — 88305 TISSUE EXAM BY PATHOLOGIST: CPT

## 2024-02-01 ENCOUNTER — LAB REQUISITION (OUTPATIENT)
Dept: LAB | Facility: HOSPITAL | Age: 69
End: 2024-02-01
Payer: MEDICARE

## 2024-02-02 ENCOUNTER — CLINICAL SUPPORT (OUTPATIENT)
Dept: ALLERGY | Facility: CLINIC | Age: 69
End: 2024-02-02
Payer: MEDICARE

## 2024-02-02 DIAGNOSIS — J30.89 NON-SEASONAL ALLERGIC RHINITIS, UNSPECIFIED TRIGGER: ICD-10-CM

## 2024-02-02 DIAGNOSIS — J30.81 ALLERGIC RHINITIS DUE TO ANIMAL (CAT) (DOG) HAIR AND DANDER: ICD-10-CM

## 2024-02-02 DIAGNOSIS — J30.1 ALLERGIC RHINITIS DUE TO POLLEN, UNSPECIFIED SEASONALITY: ICD-10-CM

## 2024-02-02 PROCEDURE — 95117 IMMUNOTHERAPY INJECTIONS: CPT | Performed by: ALLERGY & IMMUNOLOGY

## 2024-02-05 ENCOUNTER — CLINICAL SUPPORT (OUTPATIENT)
Dept: ALLERGY | Facility: CLINIC | Age: 69
End: 2024-02-05
Payer: MEDICARE

## 2024-02-05 DIAGNOSIS — J30.89 NON-SEASONAL ALLERGIC RHINITIS, UNSPECIFIED TRIGGER: ICD-10-CM

## 2024-02-05 DIAGNOSIS — J30.1 ALLERGIC RHINITIS DUE TO POLLEN, UNSPECIFIED SEASONALITY: ICD-10-CM

## 2024-02-05 DIAGNOSIS — J30.81 ALLERGIC RHINITIS DUE TO ANIMAL (CAT) (DOG) HAIR AND DANDER: ICD-10-CM

## 2024-02-05 LAB
LABORATORY COMMENT REPORT: NORMAL
PATH REPORT.FINAL DX SPEC: NORMAL
PATH REPORT.GROSS SPEC: NORMAL
PATH REPORT.RELEVANT HX SPEC: NORMAL
PATH REPORT.TOTAL CANCER: NORMAL

## 2024-02-05 PROCEDURE — 95117 IMMUNOTHERAPY INJECTIONS: CPT | Performed by: ALLERGY & IMMUNOLOGY

## 2024-02-07 ENCOUNTER — APPOINTMENT (OUTPATIENT)
Dept: PULMONOLOGY | Facility: CLINIC | Age: 69
End: 2024-02-07
Payer: MEDICARE

## 2024-02-09 ENCOUNTER — APPOINTMENT (OUTPATIENT)
Dept: ALLERGY | Facility: CLINIC | Age: 69
End: 2024-02-09
Payer: MEDICARE

## 2024-02-12 ENCOUNTER — APPOINTMENT (OUTPATIENT)
Dept: PULMONOLOGY | Facility: CLINIC | Age: 69
End: 2024-02-12
Payer: MEDICARE

## 2024-02-16 ENCOUNTER — CLINICAL SUPPORT (OUTPATIENT)
Dept: ALLERGY | Facility: CLINIC | Age: 69
End: 2024-02-16
Payer: MEDICARE

## 2024-02-16 DIAGNOSIS — J30.81 ALLERGIC RHINITIS DUE TO ANIMAL (CAT) (DOG) HAIR AND DANDER: ICD-10-CM

## 2024-02-16 DIAGNOSIS — J30.1 ALLERGIC RHINITIS DUE TO POLLEN, UNSPECIFIED SEASONALITY: ICD-10-CM

## 2024-02-16 DIAGNOSIS — J30.89 NON-SEASONAL ALLERGIC RHINITIS, UNSPECIFIED TRIGGER: ICD-10-CM

## 2024-02-16 PROCEDURE — 95117 IMMUNOTHERAPY INJECTIONS: CPT | Performed by: ALLERGY & IMMUNOLOGY

## 2024-02-19 DIAGNOSIS — J45.909 ASTHMA WITHOUT STATUS ASTHMATICUS WITHOUT COMPLICATION, UNSPECIFIED ASTHMA SEVERITY, UNSPECIFIED WHETHER PERSISTENT (HHS-HCC): Primary | ICD-10-CM

## 2024-02-19 DIAGNOSIS — J45.909 ASTHMA WITHOUT STATUS ASTHMATICUS WITHOUT COMPLICATION, UNSPECIFIED ASTHMA SEVERITY, UNSPECIFIED WHETHER PERSISTENT (HHS-HCC): ICD-10-CM

## 2024-02-19 RX ORDER — FLUTICASONE FUROATE AND VILANTEROL 200; 25 UG/1; UG/1
1 POWDER RESPIRATORY (INHALATION) DAILY
Qty: 1 EACH | Refills: 5 | Status: SHIPPED | OUTPATIENT
Start: 2024-02-19 | End: 2024-02-19

## 2024-02-19 RX ORDER — FLUTICASONE PROPIONATE AND SALMETEROL XINAFOATE 45; 21 UG/1; UG/1
2 AEROSOL, METERED RESPIRATORY (INHALATION)
Qty: 10.6 G | Refills: 11 | Status: SHIPPED | OUTPATIENT
Start: 2024-02-19 | End: 2024-03-06 | Stop reason: WASHOUT

## 2024-02-20 ENCOUNTER — CLINICAL SUPPORT (OUTPATIENT)
Dept: ALLERGY | Facility: CLINIC | Age: 69
End: 2024-02-20
Payer: MEDICARE

## 2024-02-20 DIAGNOSIS — J30.89 NON-SEASONAL ALLERGIC RHINITIS, UNSPECIFIED TRIGGER: ICD-10-CM

## 2024-02-20 DIAGNOSIS — J30.1 ALLERGIC RHINITIS DUE TO POLLEN, UNSPECIFIED SEASONALITY: ICD-10-CM

## 2024-02-20 DIAGNOSIS — J30.81 ALLERGIC RHINITIS DUE TO ANIMAL (CAT) (DOG) HAIR AND DANDER: ICD-10-CM

## 2024-02-20 PROCEDURE — 95117 IMMUNOTHERAPY INJECTIONS: CPT | Performed by: ALLERGY & IMMUNOLOGY

## 2024-02-23 ENCOUNTER — APPOINTMENT (OUTPATIENT)
Dept: ALLERGY | Facility: CLINIC | Age: 69
End: 2024-02-23
Payer: MEDICARE

## 2024-02-26 ENCOUNTER — APPOINTMENT (OUTPATIENT)
Dept: ALLERGY | Facility: CLINIC | Age: 69
End: 2024-02-26
Payer: MEDICARE

## 2024-02-27 ENCOUNTER — CLINICAL SUPPORT (OUTPATIENT)
Dept: ALLERGY | Facility: CLINIC | Age: 69
End: 2024-02-27
Payer: MEDICARE

## 2024-02-27 DIAGNOSIS — J30.81 ALLERGIC RHINITIS DUE TO ANIMAL (CAT) (DOG) HAIR AND DANDER: ICD-10-CM

## 2024-02-27 DIAGNOSIS — J30.1 ALLERGIC RHINITIS DUE TO POLLEN, UNSPECIFIED SEASONALITY: ICD-10-CM

## 2024-02-27 DIAGNOSIS — J30.89 NON-SEASONAL ALLERGIC RHINITIS, UNSPECIFIED TRIGGER: ICD-10-CM

## 2024-02-27 PROCEDURE — 95117 IMMUNOTHERAPY INJECTIONS: CPT | Performed by: ALLERGY & IMMUNOLOGY

## 2024-02-27 PROCEDURE — 0753T DGTZ GLS MCRSCP SLD LEVEL IV: CPT

## 2024-02-27 PROCEDURE — 88305 TISSUE EXAM BY PATHOLOGIST: CPT | Performed by: PATHOLOGY

## 2024-02-27 PROCEDURE — 88305 TISSUE EXAM BY PATHOLOGIST: CPT

## 2024-02-28 ENCOUNTER — LAB REQUISITION (OUTPATIENT)
Dept: LAB | Facility: HOSPITAL | Age: 69
End: 2024-02-28
Payer: MEDICARE

## 2024-02-28 ENCOUNTER — APPOINTMENT (OUTPATIENT)
Dept: CARDIOLOGY | Facility: HOSPITAL | Age: 69
End: 2024-02-28
Payer: MEDICARE

## 2024-02-28 DIAGNOSIS — D49.2 NEOPLASM OF UNSPECIFIED BEHAVIOR OF BONE, SOFT TISSUE, AND SKIN: ICD-10-CM

## 2024-03-01 ENCOUNTER — CLINICAL SUPPORT (OUTPATIENT)
Dept: ALLERGY | Facility: CLINIC | Age: 69
End: 2024-03-01
Payer: MEDICARE

## 2024-03-01 DIAGNOSIS — J30.1 ALLERGIC RHINITIS DUE TO POLLEN, UNSPECIFIED SEASONALITY: ICD-10-CM

## 2024-03-01 DIAGNOSIS — J30.81 ALLERGIC RHINITIS DUE TO ANIMAL (CAT) (DOG) HAIR AND DANDER: ICD-10-CM

## 2024-03-01 DIAGNOSIS — J30.89 NON-SEASONAL ALLERGIC RHINITIS, UNSPECIFIED TRIGGER: ICD-10-CM

## 2024-03-01 PROCEDURE — 95117 IMMUNOTHERAPY INJECTIONS: CPT | Performed by: ALLERGY & IMMUNOLOGY

## 2024-03-04 ENCOUNTER — CLINICAL SUPPORT (OUTPATIENT)
Dept: ALLERGY | Facility: CLINIC | Age: 69
End: 2024-03-04
Payer: MEDICARE

## 2024-03-04 DIAGNOSIS — J30.81 ALLERGIC RHINITIS DUE TO ANIMAL (CAT) (DOG) HAIR AND DANDER: ICD-10-CM

## 2024-03-04 DIAGNOSIS — J30.89 NON-SEASONAL ALLERGIC RHINITIS, UNSPECIFIED TRIGGER: ICD-10-CM

## 2024-03-04 DIAGNOSIS — J30.1 ALLERGIC RHINITIS DUE TO POLLEN, UNSPECIFIED SEASONALITY: ICD-10-CM

## 2024-03-04 PROCEDURE — 95117 IMMUNOTHERAPY INJECTIONS: CPT | Performed by: ALLERGY & IMMUNOLOGY

## 2024-03-06 ENCOUNTER — OFFICE VISIT (OUTPATIENT)
Dept: PRIMARY CARE | Facility: CLINIC | Age: 69
End: 2024-03-06
Payer: MEDICARE

## 2024-03-06 ENCOUNTER — LAB (OUTPATIENT)
Dept: LAB | Facility: LAB | Age: 69
End: 2024-03-06
Payer: MEDICARE

## 2024-03-06 VITALS
DIASTOLIC BLOOD PRESSURE: 82 MMHG | WEIGHT: 166.4 LBS | HEIGHT: 63 IN | HEART RATE: 61 BPM | SYSTOLIC BLOOD PRESSURE: 145 MMHG | BODY MASS INDEX: 29.48 KG/M2 | OXYGEN SATURATION: 97 % | TEMPERATURE: 97.8 F

## 2024-03-06 DIAGNOSIS — R60.9 DEPENDENT EDEMA: ICD-10-CM

## 2024-03-06 DIAGNOSIS — R79.89 ELEVATED LFTS: Primary | ICD-10-CM

## 2024-03-06 DIAGNOSIS — R00.2 PALPITATIONS: ICD-10-CM

## 2024-03-06 DIAGNOSIS — R25.2 MUSCLE CRAMPING: ICD-10-CM

## 2024-03-06 DIAGNOSIS — R25.2 MUSCLE CRAMPING: Primary | ICD-10-CM

## 2024-03-06 DIAGNOSIS — B37.0 THRUSH: ICD-10-CM

## 2024-03-06 DIAGNOSIS — K76.0 HEPATIC STEATOSIS: ICD-10-CM

## 2024-03-06 PROBLEM — J01.90 ACUTE RHINOSINUSITIS: Status: ACTIVE | Noted: 2024-03-06

## 2024-03-06 PROBLEM — R05.9 COUGH, UNSPECIFIED: Status: ACTIVE | Noted: 2023-03-27

## 2024-03-06 PROBLEM — H10.45 CHRONIC ALLERGIC CONJUNCTIVITIS: Status: ACTIVE | Noted: 2024-03-06

## 2024-03-06 PROBLEM — M76.60 ACHILLES TENDINITIS: Status: ACTIVE | Noted: 2023-09-03

## 2024-03-06 PROBLEM — R06.09 DYSPNEA ON EXERTION: Status: ACTIVE | Noted: 2024-03-06

## 2024-03-06 PROBLEM — R07.9 CHEST PAIN: Status: ACTIVE | Noted: 2024-03-06

## 2024-03-06 PROBLEM — J30.9 ALLERGIC RHINITIS: Status: ACTIVE | Noted: 2023-04-11

## 2024-03-06 PROBLEM — T50.905A ADVERSE EFFECT OF BIOLOGICAL SUBSTANCE: Status: ACTIVE | Noted: 2024-03-06

## 2024-03-06 PROBLEM — R05.2 SUBACUTE COUGH: Status: ACTIVE | Noted: 2023-03-27

## 2024-03-06 PROBLEM — J32.9 SINUSITIS: Status: ACTIVE | Noted: 2024-03-06

## 2024-03-06 PROBLEM — N95.2 ATROPHIC VAGINITIS: Status: ACTIVE | Noted: 2024-03-06

## 2024-03-06 PROBLEM — R10.84 GENERALIZED ABDOMINAL PAIN: Status: ACTIVE | Noted: 2023-05-22

## 2024-03-06 LAB
ALBUMIN SERPL BCP-MCNC: 4.1 G/DL (ref 3.4–5)
ALP SERPL-CCNC: 68 U/L (ref 33–136)
ALT SERPL W P-5'-P-CCNC: 60 U/L (ref 7–45)
ANION GAP SERPL CALC-SCNC: 12 MMOL/L (ref 10–20)
AST SERPL W P-5'-P-CCNC: 41 U/L (ref 9–39)
BILIRUB SERPL-MCNC: 0.6 MG/DL (ref 0–1.2)
BUN SERPL-MCNC: 10 MG/DL (ref 6–23)
CALCIUM SERPL-MCNC: 9.3 MG/DL (ref 8.6–10.3)
CHLORIDE SERPL-SCNC: 104 MMOL/L (ref 98–107)
CK SERPL-CCNC: 82 U/L (ref 0–215)
CO2 SERPL-SCNC: 30 MMOL/L (ref 21–32)
CREAT SERPL-MCNC: 0.63 MG/DL (ref 0.5–1.05)
EGFRCR SERPLBLD CKD-EPI 2021: >90 ML/MIN/1.73M*2
GLUCOSE SERPL-MCNC: 103 MG/DL (ref 74–99)
LABORATORY COMMENT REPORT: NORMAL
MAGNESIUM SERPL-MCNC: 1.96 MG/DL (ref 1.6–2.4)
PATH REPORT.FINAL DX SPEC: NORMAL
PATH REPORT.GROSS SPEC: NORMAL
PATH REPORT.RELEVANT HX SPEC: NORMAL
PATH REPORT.TOTAL CANCER: NORMAL
POTASSIUM SERPL-SCNC: 4.2 MMOL/L (ref 3.5–5.3)
PROT SERPL-MCNC: 6.2 G/DL (ref 6.4–8.2)
SODIUM SERPL-SCNC: 142 MMOL/L (ref 136–145)

## 2024-03-06 PROCEDURE — 93000 ELECTROCARDIOGRAM COMPLETE: CPT | Performed by: FAMILY MEDICINE

## 2024-03-06 PROCEDURE — 83735 ASSAY OF MAGNESIUM: CPT

## 2024-03-06 PROCEDURE — 82550 ASSAY OF CK (CPK): CPT

## 2024-03-06 PROCEDURE — 99214 OFFICE O/P EST MOD 30 MIN: CPT | Performed by: FAMILY MEDICINE

## 2024-03-06 PROCEDURE — 1123F ACP DISCUSS/DSCN MKR DOCD: CPT | Performed by: FAMILY MEDICINE

## 2024-03-06 PROCEDURE — 1036F TOBACCO NON-USER: CPT | Performed by: FAMILY MEDICINE

## 2024-03-06 PROCEDURE — 1126F AMNT PAIN NOTED NONE PRSNT: CPT | Performed by: FAMILY MEDICINE

## 2024-03-06 PROCEDURE — 80053 COMPREHEN METABOLIC PANEL: CPT

## 2024-03-06 PROCEDURE — 1160F RVW MEDS BY RX/DR IN RCRD: CPT | Performed by: FAMILY MEDICINE

## 2024-03-06 PROCEDURE — 1159F MED LIST DOCD IN RCRD: CPT | Performed by: FAMILY MEDICINE

## 2024-03-06 PROCEDURE — 1158F ADVNC CARE PLAN TLK DOCD: CPT | Performed by: FAMILY MEDICINE

## 2024-03-06 PROCEDURE — 36415 COLL VENOUS BLD VENIPUNCTURE: CPT

## 2024-03-06 RX ORDER — ACETAMINOPHEN 325 MG/1
TABLET ORAL EVERY 4 HOURS PRN
COMMUNITY
Start: 2015-06-28

## 2024-03-06 ASSESSMENT — PATIENT HEALTH QUESTIONNAIRE - PHQ9
1. LITTLE INTEREST OR PLEASURE IN DOING THINGS: NOT AT ALL
SUM OF ALL RESPONSES TO PHQ9 QUESTIONS 1 AND 2: 0
2. FEELING DOWN, DEPRESSED OR HOPELESS: NOT AT ALL

## 2024-03-06 ASSESSMENT — ENCOUNTER SYMPTOMS: PALPITATIONS: 1

## 2024-03-06 ASSESSMENT — PAIN SCALES - GENERAL: PAINLEVEL: 0-NO PAIN

## 2024-03-06 NOTE — PROGRESS NOTES
"Subjective   Patient ID: Jenn Antunez is a 69 y.o. female who presents for Edema (Retaining water in legs and feet for last couple months. ) and Palpitations (Heart palpitations said heart is doing weird stuff. Feels like it skips a beat. Has appt with cardiologist at end of month).    ÁNGELA Stockton is presenting to clinic today for concerns of recent lower limb edema, she reports using her husbands lasixs and lost 3lbs on it, feels like she was unable to see her ankles and after the medication was no longer edematous.  She also reports muscle cramping prior to medication use and feels like maybe her electrolytes are unbalanced.  She has a long term history of heart murmur but feels like her palpitations have worsened recently. She does report that she is changing cardiologist and has an appointment March 29th for follow up     Review of Systems   Cardiovascular:  Positive for palpitations and leg swelling.   Musculoskeletal:         Muscle cramps   All other systems reviewed and are negative.      Objective   /82   Pulse 61   Temp 36.6 °C (97.8 °F)   Ht 1.6 m (5' 3\")   Wt 75.5 kg (166 lb 6.4 oz)   SpO2 97%   BMI 29.48 kg/m²     Physical Exam  Constitutional:       Appearance: Normal appearance.   HENT:      Head: Normocephalic and atraumatic.      Right Ear: External ear normal.      Left Ear: External ear normal.      Nose: Nose normal. No congestion or rhinorrhea.   Eyes:      General: No scleral icterus.     Extraocular Movements: Extraocular movements intact.      Conjunctiva/sclera: Conjunctivae normal.   Cardiovascular:      Rate and Rhythm: Normal rate and regular rhythm.      Pulses: Normal pulses.      Heart sounds: Normal heart sounds.      Comments: Systolic blowing murmur  Pulmonary:      Effort: Pulmonary effort is normal.      Breath sounds: Normal breath sounds.   Musculoskeletal:         General: Tenderness present. No swelling. Normal range of motion.      Cervical back: Normal " range of motion and neck supple.      Right lower leg: No edema.      Left lower leg: No edema.   Skin:     General: Skin is warm and dry.      Capillary Refill: Capillary refill takes less than 2 seconds.   Neurological:      General: No focal deficit present.      Mental Status: She is alert and oriented to person, place, and time. Mental status is at baseline.   Psychiatric:         Mood and Affect: Mood normal.         Behavior: Behavior normal.         Thought Content: Thought content normal.         Judgment: Judgment normal.         Assessment/Plan   Diagnoses and all orders for this visit:  Muscle cramping  -     CK; Future  -     Magnesium; Future  Palpitations  -     ECG 12 lead (Clinic Performed)  Dependent edema  -     Comprehensive metabolic panel; Future    Jenn is a 70yo F presenting with PMH of heart murmur and new onset edema with muscle cramping.   She is concerned of BLE however on PE no edema noted on upper or lower limbs  She has a cardiologist appointment at the end of the month for similar concerns.   Discussed possible edema related to venous insufficiency as limbs are non edematous today, non erythematous. Discussed supportive treatment with compression socks for symtpomatic management and relief.   CMP, magnesium and CK ordered today for muscle cramping concern  EKG in office completed for palpitation with known murmur history, no abnormalities noted on EKG, RRR, keep cardiology appointment for further management     I have personally reviewed all available pertinent labs, imaging, and consult notes with the patient.     All questions and concerns were addressed. Patient verbalizes understanding instructions and agrees with established plan of care.     Patient seen and discussed with Dr. Kishore Wilson MD

## 2024-03-06 NOTE — PROGRESS NOTES
I reviewed and examined the patient. I was present for the key exam elements, and I fully participated in the patient's care. I discussed the management of the care with the resident. I have personally reviewed the pertinent labs and imaging, as well as recent notes, with the patient. I have reviewed the note above and agree with the resident's medical decision making as documented in the resident's note, in addition to the following comments / findings:     Agree with the rest of the plan outlined below by resident physician. No red flags.      The patient understands and agrees to the assessment and plan of care. Patient has also agreed to follow up and comply with the treatment and evaluation as recommended today. Patient was instructed to call the office at [phone] should questions arise regarding their treatment or care.     Devin Novak DO, FAOASM  Family Medicine   [address], [address]  [place] [address]     Devin Novak DO

## 2024-03-08 ENCOUNTER — CLINICAL SUPPORT (OUTPATIENT)
Dept: ALLERGY | Facility: CLINIC | Age: 69
End: 2024-03-08
Payer: MEDICARE

## 2024-03-08 DIAGNOSIS — J30.81 ALLERGIC RHINITIS DUE TO ANIMAL (CAT) (DOG) HAIR AND DANDER: ICD-10-CM

## 2024-03-08 DIAGNOSIS — J30.1 ALLERGIC RHINITIS DUE TO POLLEN, UNSPECIFIED SEASONALITY: ICD-10-CM

## 2024-03-08 DIAGNOSIS — J30.89 NON-SEASONAL ALLERGIC RHINITIS, UNSPECIFIED TRIGGER: ICD-10-CM

## 2024-03-08 PROCEDURE — 95117 IMMUNOTHERAPY INJECTIONS: CPT | Performed by: ALLERGY & IMMUNOLOGY

## 2024-03-10 ENCOUNTER — HOSPITAL ENCOUNTER (OUTPATIENT)
Dept: RADIOLOGY | Facility: HOSPITAL | Age: 69
Discharge: HOME | End: 2024-03-10
Payer: MEDICARE

## 2024-03-10 DIAGNOSIS — R79.89 ELEVATED LFTS: ICD-10-CM

## 2024-03-10 PROCEDURE — 76705 ECHO EXAM OF ABDOMEN: CPT

## 2024-03-10 PROCEDURE — 76705 ECHO EXAM OF ABDOMEN: CPT | Performed by: RADIOLOGY

## 2024-03-11 ENCOUNTER — CLINICAL SUPPORT (OUTPATIENT)
Dept: ALLERGY | Facility: CLINIC | Age: 69
End: 2024-03-11
Payer: MEDICARE

## 2024-03-11 DIAGNOSIS — J30.81 ALLERGIC RHINITIS DUE TO ANIMAL (CAT) (DOG) HAIR AND DANDER: ICD-10-CM

## 2024-03-11 DIAGNOSIS — J30.1 ALLERGIC RHINITIS DUE TO POLLEN, UNSPECIFIED SEASONALITY: ICD-10-CM

## 2024-03-11 DIAGNOSIS — J30.89 NON-SEASONAL ALLERGIC RHINITIS, UNSPECIFIED TRIGGER: ICD-10-CM

## 2024-03-11 PROCEDURE — 95117 IMMUNOTHERAPY INJECTIONS: CPT | Performed by: ALLERGY & IMMUNOLOGY

## 2024-03-11 RX ORDER — NYSTATIN 100000 [USP'U]/ML
500000 SUSPENSION ORAL 4 TIMES DAILY
Qty: 280 ML | Refills: 0 | Status: SHIPPED | OUTPATIENT
Start: 2024-03-11 | End: 2024-03-25

## 2024-03-11 NOTE — PROGRESS NOTES
Telephone note:    Called patient back about US results  Discussed hepatic steatosis  Patient requested nutritional services to change diet and lifestyle to reverse fatty liver disease  Repeat CMP and US placed in 6 months per patient request to evaluate lifestyle changes she will implement    Patient reports thrush from new inhaler even after rinsing mouth   Discussed mouthwash along with water rinse after inhaler and to continue using her inhaler    Ordered Nystatin rinse today     Will see patient back after repeat testing is completed    I have personally reviewed all available pertinent labs, imaging, and consult notes with the patient.     All questions and concerns were addressed. Patient verbalizes understanding instructions and agrees with established plan of care.     Patient discussed with Dr. Kishore Wilson MD

## 2024-03-14 DIAGNOSIS — J45.909 ASTHMA WITHOUT STATUS ASTHMATICUS WITHOUT COMPLICATION, UNSPECIFIED ASTHMA SEVERITY, UNSPECIFIED WHETHER PERSISTENT (HHS-HCC): ICD-10-CM

## 2024-03-15 ENCOUNTER — OFFICE VISIT (OUTPATIENT)
Dept: ALLERGY | Facility: CLINIC | Age: 69
End: 2024-03-15
Payer: MEDICARE

## 2024-03-15 DIAGNOSIS — J30.89 ALLERGIC RHINITIS DUE TO OTHER ALLERGIC TRIGGER, UNSPECIFIED SEASONALITY: Primary | ICD-10-CM

## 2024-03-15 PROCEDURE — 1159F MED LIST DOCD IN RCRD: CPT | Performed by: ALLERGY & IMMUNOLOGY

## 2024-03-15 PROCEDURE — 1160F RVW MEDS BY RX/DR IN RCRD: CPT | Performed by: ALLERGY & IMMUNOLOGY

## 2024-03-15 PROCEDURE — 95117 IMMUNOTHERAPY INJECTIONS: CPT | Performed by: ALLERGY & IMMUNOLOGY

## 2024-03-18 RX ORDER — FLUTICASONE PROPIONATE AND SALMETEROL XINAFOATE 45; 21 UG/1; UG/1
2 AEROSOL, METERED RESPIRATORY (INHALATION)
Qty: 12 G | Refills: 11 | Status: SHIPPED | OUTPATIENT
Start: 2024-03-18

## 2024-03-19 ENCOUNTER — OFFICE VISIT (OUTPATIENT)
Dept: ALLERGY | Facility: CLINIC | Age: 69
End: 2024-03-19
Payer: MEDICARE

## 2024-03-19 DIAGNOSIS — J30.89 ALLERGIC RHINITIS DUE TO OTHER ALLERGIC TRIGGER, UNSPECIFIED SEASONALITY: Primary | ICD-10-CM

## 2024-03-19 PROCEDURE — 1159F MED LIST DOCD IN RCRD: CPT | Performed by: ALLERGY & IMMUNOLOGY

## 2024-03-19 PROCEDURE — 95117 IMMUNOTHERAPY INJECTIONS: CPT | Performed by: ALLERGY & IMMUNOLOGY

## 2024-03-19 PROCEDURE — 1160F RVW MEDS BY RX/DR IN RCRD: CPT | Performed by: ALLERGY & IMMUNOLOGY

## 2024-03-22 ENCOUNTER — OFFICE VISIT (OUTPATIENT)
Dept: ALLERGY | Facility: CLINIC | Age: 69
End: 2024-03-22
Payer: MEDICARE

## 2024-03-22 DIAGNOSIS — J30.89 ALLERGIC RHINITIS DUE TO OTHER ALLERGIC TRIGGER, UNSPECIFIED SEASONALITY: Primary | ICD-10-CM

## 2024-03-22 PROCEDURE — 1160F RVW MEDS BY RX/DR IN RCRD: CPT | Performed by: ALLERGY & IMMUNOLOGY

## 2024-03-22 PROCEDURE — 1159F MED LIST DOCD IN RCRD: CPT | Performed by: ALLERGY & IMMUNOLOGY

## 2024-03-22 PROCEDURE — 95117 IMMUNOTHERAPY INJECTIONS: CPT | Performed by: ALLERGY & IMMUNOLOGY

## 2024-03-25 ENCOUNTER — TELEMEDICINE CLINICAL SUPPORT (OUTPATIENT)
Dept: NUTRITION | Facility: CLINIC | Age: 69
End: 2024-03-25
Payer: MEDICARE

## 2024-03-25 DIAGNOSIS — R79.89 ELEVATED LFTS: ICD-10-CM

## 2024-03-25 DIAGNOSIS — E66.3 OVERWEIGHT: Primary | ICD-10-CM

## 2024-03-25 DIAGNOSIS — K76.0 HEPATIC STEATOSIS: ICD-10-CM

## 2024-03-25 PROCEDURE — 97802 MEDICAL NUTRITION INDIV IN: CPT | Mod: 95 | Performed by: FAMILY MEDICINE

## 2024-03-25 NOTE — PATIENT INSTRUCTIONS
Nutrition Education Material: Heart-Healthy Eating Mediterranean Style, Mindful Eating, and NCM: Weight Loss Tips    Provided patient with my office phone # and email address for any further questions.

## 2024-03-25 NOTE — PROGRESS NOTES
"Nutrition Assessment     Reason for Visit:  Jenn Antunez is a 69 y.o. female who presents for Hepatic Stetosis  Pt scheduled for a virtual appointment. Identification was verified with 2 sources of identification. Patient was in [place] at time of visit.  HIPAA protocol was maintained.  Pt reports she requested referral from MD for nutrition counseling.     Anthropometrics:  as of 3/6/2024    BMI:  29.48 kg/m² Abnormal    Height:   1.6 m (5' 3\")    Weight:   75.5 kg (166 lb 6.4 oz)     Significant Past Medical Hx:  HLD, AILYN    Biochemical/Laboratory Data:  Lab Results   Component Value Date    HGBA1C 5.7 (H) 10/18/2023    CHOL 181 10/18/2023    LDLF 150 (H) 07/29/2021    TRIG 139 10/18/2023    AST 41, ALT 60     Food And Nutrient Intake:  Food and Nutrient History  Food and Nutrient History: Met w/ pt to obtain diet hx and instruct on diet guidelines for weight loss and NAFLD.  Diet hx indicates diet with limited processed foods - rarely eats out and tends to make meals from scratch.  Regular intake of sweets as she reports she does baking and likes sweets after meals.  Fish/salmon 1x/wk.  Less than recommended intake of fruits and vegs, although does like them and eats some daily.  Fluid Intake: water, coffee 1-2x/d  Food Allergy: none  Food Intolerance: none     Food Intake  Meal 1: skips and doesn't eat until noon  Meal 2: smoothie (greens, greek yogurt, fruit, lashawn seeds)   1 hour later has turkey sandwich on WG, nothing with it  Meal 3: soft beef tacos and salad;   tonight plans to have lasagna with Italian sausage and salad;  fish 1x/wk  Snacks: baked goods,  candy    Food Preparation  Cooking: Patient  Grocery Shopping: Patient  Dining Out: 1 to 3 times a month  Grocery Shopping Schedule: Planned weekly shopping and Monthly  Convenience/Processed Foods: Denies    Cooking Skills/Barriers: None reported  Meal Preparation Habits: Has cooking skills, Plans meals ahead, Does some meal prepping ahead of meal times, " and Reports eats vegetables, but usually just at dinner  Eating Out Type: Dinner and Restaurant <1x/month  Relationship with Food:   Appetite: Low in the morning, higher later in the day  Binging: Never    Physical Activities:  Physical Activity  Physical Activity History: enjoys walking, hiking, biking;   just started to gym 2 months ago and does weights and aerobic activity   3x/wk    Nutrition Diagnosis   Nutrition Diagnosis  Patient has Nutrition Diagnosis: Yes  Diagnosis Status (1): New  Nutrition Diagnosis 1: Altered nutrition related to laboratory values  Related to (1): fatty liver disease  As Evidenced by (1): elevated LFT's and liver ultrasound with fatty liver infiltrates    Nutrition Interventions/Recommendations   Nutrition Prescription  Individualized Nutrition Prescription Provided for : Mediterranean Diet  Food and Nutrition Delivery  Meals & Snacks: General Healthful Diet  Nutrition Education  Nutrition Education Content: Content related nutrition education, Education on nutrition's influence on health  Goals: 1)  Aim to include protein and fiber with meals/snacks   2) Aim to have at least 1-2 fistful of vegs with lunch and dinner   3) Decrease frequency of sweets  Reviewed with patient diet guidelines for a Mediterranean style diet.  Encouraged a variety of fruits, vegetables, beans, nuts, seeds, fish/shellfish and chicken and turkey in place of more red meats.  Discussed limiting intake of saturated fat and choosing healthier fats such as olive and avocado oil.  Reviewed strategies for incorporating more fiber in diet including switching from refined/processed grains to more whole grains.  Suggested healthier dessert and snack options.     Nutrition Monitoring and Evaluation   Food/Nutrient Related History Monitoring  Monitoring and Evaluation Plan: Meal/snack pattern, Carbohydrate intake  Meal/Snack Pattern: Estimated meal and snack pattern, Food variety  Criteria: meals that include protein and  fiber  Estimated carbohydrate intake: Estimated carbohydrate intake  Criteria: decrease in foods with added sugars  Knowledge/Beliefs/Attitudes  Monitoring and Evaluation Plan: Physical activity  Physical activity: Frequency  Criteria: maintains physical activity 3x/wk  Body Composition/Growth/Weight History  Monitoring and Evaluation Plan: Weight  Weight: Measured weight  Criteria: weight loss of 1#/wk  Evaluation of effectiveness of diet intervention/diet eduction include:  changes in biochemical data; changes in anthropometric measurements; changes in diet hx; patient understanding and implementation of goals set with patient and diet education provided.        Readiness to Change : Good  Level of Understanding : Good  Anticipated Compliant : Good

## 2024-03-29 ENCOUNTER — OFFICE VISIT (OUTPATIENT)
Dept: CARDIOLOGY | Facility: HOSPITAL | Age: 69
End: 2024-03-29
Payer: MEDICARE

## 2024-03-29 VITALS
BODY MASS INDEX: 28.74 KG/M2 | HEART RATE: 70 BPM | DIASTOLIC BLOOD PRESSURE: 72 MMHG | OXYGEN SATURATION: 97 % | WEIGHT: 162.26 LBS | SYSTOLIC BLOOD PRESSURE: 115 MMHG

## 2024-03-29 DIAGNOSIS — I65.29 CAROTID ARTERY STENOSIS: ICD-10-CM

## 2024-03-29 DIAGNOSIS — I65.23 CAROTID ARTERY STENOSIS, ASYMPTOMATIC, BILATERAL: ICD-10-CM

## 2024-03-29 DIAGNOSIS — R60.0 BILATERAL LEG EDEMA: Primary | ICD-10-CM

## 2024-03-29 PROCEDURE — 1160F RVW MEDS BY RX/DR IN RCRD: CPT | Performed by: INTERNAL MEDICINE

## 2024-03-29 PROCEDURE — 99215 OFFICE O/P EST HI 40 MIN: CPT | Performed by: INTERNAL MEDICINE

## 2024-03-29 PROCEDURE — 1159F MED LIST DOCD IN RCRD: CPT | Performed by: INTERNAL MEDICINE

## 2024-03-29 PROCEDURE — 99205 OFFICE O/P NEW HI 60 MIN: CPT | Performed by: INTERNAL MEDICINE

## 2024-04-02 ENCOUNTER — OFFICE VISIT (OUTPATIENT)
Dept: ALLERGY | Facility: CLINIC | Age: 69
End: 2024-04-02
Payer: MEDICARE

## 2024-04-02 VITALS
DIASTOLIC BLOOD PRESSURE: 76 MMHG | BODY MASS INDEX: 28.39 KG/M2 | HEIGHT: 63 IN | HEART RATE: 84 BPM | WEIGHT: 160.2 LBS | SYSTOLIC BLOOD PRESSURE: 137 MMHG

## 2024-04-02 DIAGNOSIS — J30.1 ALLERGIC RHINITIS DUE TO POLLEN, UNSPECIFIED SEASONALITY: ICD-10-CM

## 2024-04-02 DIAGNOSIS — J30.81 ALLERGIC RHINITIS DUE TO ANIMAL (CAT) (DOG) HAIR AND DANDER: Primary | ICD-10-CM

## 2024-04-02 DIAGNOSIS — H10.45 CHRONIC ALLERGIC CONJUNCTIVITIS: ICD-10-CM

## 2024-04-02 PROCEDURE — 99214 OFFICE O/P EST MOD 30 MIN: CPT | Performed by: ALLERGY & IMMUNOLOGY

## 2024-04-02 PROCEDURE — 95117 IMMUNOTHERAPY INJECTIONS: CPT | Performed by: ALLERGY & IMMUNOLOGY

## 2024-04-02 PROCEDURE — 1160F RVW MEDS BY RX/DR IN RCRD: CPT | Performed by: ALLERGY & IMMUNOLOGY

## 2024-04-02 PROCEDURE — 1036F TOBACCO NON-USER: CPT | Performed by: ALLERGY & IMMUNOLOGY

## 2024-04-02 PROCEDURE — 1159F MED LIST DOCD IN RCRD: CPT | Performed by: ALLERGY & IMMUNOLOGY

## 2024-04-02 NOTE — PROGRESS NOTES
"Subjective   Patient ID:   [id number]   Jenn Antunez is a 69 y.o. female who presents for Allergies (BLUE VIAL FUV & SHOT).    Chief Complaint   Patient presents with    Allergies     BLUE VIAL FUV & SHOT          HPI  This patient is here to evaluate for:    Here for followup of allergies. This patient is about [facility] through the immunotherapy buildup.     Immunotherapy started: Current dose: RED vials (1:!) in ml:  0.05  Vials : 24    Tolerating the allergy shots well    Allergy symptoms stable/improved  Not needing any allergy oral meds  Does take the advair disk; will be transitioning to HFA,   Albuterol use: yesterday for a walk.       Review of Systems   All other systems reviewed and are negative.        Objective     /76   Pulse 84   Ht 1.6 m (5' 3\")   Wt 72.7 kg (160 lb 3.2 oz)   BMI 28.38 kg/m²      Physical Exam  Constitutional:       General: She is not in acute distress.     Appearance: Normal appearance. She is not ill-appearing.   HENT:      Head: Normocephalic and atraumatic.      Right Ear: Tympanic membrane, ear canal and external ear normal.      Left Ear: Tympanic membrane, ear canal and external ear normal.      Nose: Nose normal. No congestion or rhinorrhea.      Mouth/Throat:      Mouth: Mucous membranes are moist.      Pharynx: Oropharynx is clear. No oropharyngeal exudate or posterior oropharyngeal erythema.   Eyes:      General:         Right eye: No discharge.         Left eye: No discharge.      Conjunctiva/sclera: Conjunctivae normal.   Cardiovascular:      Rate and Rhythm: Normal rate and regular rhythm.      Heart sounds: Normal heart sounds. No murmur heard.     No friction rub. No gallop.   Pulmonary:      Effort: Pulmonary effort is normal. No respiratory distress.      Breath sounds: Normal breath sounds. No stridor. No wheezing, rhonchi or rales.   Chest:      Chest wall: No tenderness.   Abdominal:      General: Abdomen is flat.      Palpations: Abdomen is " soft.   Musculoskeletal:         General: Normal range of motion.      Cervical back: Normal range of motion and neck supple.   Lymphadenopathy:      Cervical: No cervical adenopathy.   Skin:     General: Skin is warm and dry.      Findings: No erythema, lesion or rash.   Neurological:      General: No focal deficit present.      Mental Status: She is alert. Mental status is at baseline.   Psychiatric:         Mood and Affect: Mood normal.         Behavior: Behavior normal.         Thought Content: Thought content normal.         Judgment: Judgment normal.            Current Outpatient Medications   Medication Sig Dispense Refill    acetaminophen (Tylenol) 325 mg tablet Take by mouth every 4 hours if needed.      albuterol 108 (90 Base) MCG/ACT inhaler Inhale 2 puffs every 6 hours if needed for wheezing.      ascorbic acid (Vitamin C) 500 mg tablet Take 1 tablet (500 mg) by mouth once daily.      cholecalciferol (Vitamin D-3) 25 MCG (1000 UT) capsule Take 1 capsule (25 mcg) by mouth once daily.      EPINEPHrine 0.3 mg/0.3 mL injection syringe USE AS DIRECTED IN CASE OF A SEVERE ALLERGIC REACTION      fluticasone (Flonase) 50 mcg/actuation nasal spray Administer 2 sprays into each nostril once daily. Shake gently. Before first use, prime pump. After use, clean tip and replace cap.      fluticasone propion-salmeteroL (Advair HFA) 45-21 mcg/actuation inhaler Inhale 2 puffs 2 times a day. 12 g 11    ibuprofen 200 mg tablet Take 1 tablet (200 mg) by mouth every 6 hours.      magnesium oxide-Mg AA chelate (Magnesium, oxide/AA chelate,) 300 mg capsule Take 1 capsule (300 mg) by mouth once daily.      NON FORMULARY Take 1 each by mouth once daily. Dwaine      pantoprazole (ProtoNix) 40 mg EC tablet TAKE 1 TABLET BY MOUTH EVERY DAY 90 tablet 1    POTASSIUM ORAL Take 1 tablet by mouth once daily.      tetrahydrozoline HCl/zinc sulf (ALLERGY RELIEF EYE DROPS OPHT) Administer 1 drop into both eyes once daily as needed.       tretinoin (Retin-A) 0.05 % cream APPLY PEA SIZED AMOUNT TO FACE EVERY NIGHT      urea 40 % lotion APPLY TO AFFECTED AREAS EVERY DAY       No current facility-administered medications for this visit.       Summary of the labs over the past 6 months:    Lab on 03/06/2024   Component Date Value Ref Range Status    Glucose 03/06/2024 103 (H)  74 - 99 mg/dL Final    Sodium 03/06/2024 142  136 - 145 mmol/L Final    Potassium 03/06/2024 4.2  3.5 - 5.3 mmol/L Final    Chloride 03/06/2024 104  98 - 107 mmol/L Final    Bicarbonate 03/06/2024 30  21 - 32 mmol/L Final    Anion Gap 03/06/2024 12  10 - 20 mmol/L Final    Urea Nitrogen 03/06/2024 10  6 - 23 mg/dL Final    Creatinine 03/06/2024 0.63  0.50 - 1.05 mg/dL Final    eGFR 03/06/2024 >90  >60 mL/min/1.73m*2 Final    Calcium 03/06/2024 9.3  8.6 - 10.3 mg/dL Final    Albumin 03/06/2024 4.1  3.4 - 5.0 g/dL Final    Alkaline Phosphatase 03/06/2024 68  33 - 136 U/L Final    Total Protein 03/06/2024 6.2 (L)  6.4 - 8.2 g/dL Final    AST 03/06/2024 41 (H)  9 - 39 U/L Final    Bilirubin, Total 03/06/2024 0.6  0.0 - 1.2 mg/dL Final    ALT 03/06/2024 60 (H)  7 - 45 U/L Final    Creatine Kinase 03/06/2024 82  0 - 215 U/L Final    Magnesium 03/06/2024 1.96  1.60 - 2.40 mg/dL Final   Lab Requisition on 02/27/2024   Component Date Value Ref Range Status    Case Report 02/27/2024    Final                    Value:Surgical Pathology                                Case: [id number]                                  Authorizing Provider:  Ankit Marvin MD     Collected:           02/27/2024 1400              Ordering Location:     [facility]       Received:            02/28/2024 [facility]                                     Center                                                                       Pathologist:           Julieta Rodriguez MD                                                          Specimen:    EYELID RIGHT, RIGHT LOWER LID                                                               FINAL DIAGNOSIS 02/27/2024    Final                    Value:This result contains rich text formatting which cannot be displayed here.      02/27/2024    Final                    Value:This result contains rich text formatting which cannot be displayed here.    Clinical History 02/27/2024    Final                    Value:This result contains rich text formatting which cannot be displayed here.    Gross Description 02/27/2024    Final                    Value:This result contains rich text formatting which cannot be displayed here.   Lab Requisition on 01/31/2024   Component Date Value Ref Range Status    Case Report 01/31/2024    Final                    Value:Surgical Pathology                                Case: [id number]                                  Authorizing Provider:  Cherise Ramirez MD            Collected:           01/31/2024 1035              Ordering Location:     [facility]       Received:            02/01/2024 0618                                     Center                                                                       Pathologist:           Francis Grande MD                                                       Specimen:    ESOPHAGUS MID BIOPSY, esophagus, middle third, cold forcep, biopsy                         FINAL DIAGNOSIS 01/31/2024    Final                    Value:This result contains rich text formatting which cannot be displayed here.      01/31/2024    Final                    Value:This result contains rich text formatting which cannot be displayed here.    Clinical History 01/31/2024    Final                    Value:This result contains rich text formatting which cannot be displayed here.    Gross Description 01/31/2024    Final                    Value:This result contains rich text formatting which cannot be displayed here.   Lab on 10/18/2023   Component Date Value Ref Range Status    WBC 10/18/2023 6.5  4.4 - 11.3 x10*3/uL Final    nRBC  10/18/2023 0.0  0.0 - 0.0 /100 WBCs Final    RBC 10/18/2023 4.17  4.00 - 5.20 x10*6/uL Final    Hemoglobin 10/18/2023 12.6  12.0 - 16.0 g/dL Final    Hematocrit 10/18/2023 39.1  36.0 - 46.0 % Final    MCV 10/18/2023 94  80 - 100 fL Final    MCH 10/18/2023 30.2  26.0 - 34.0 pg Final    MCHC 10/18/2023 32.2  32.0 - 36.0 g/dL Final    RDW 10/18/2023 14.0  11.5 - 14.5 % Final    Platelets 10/18/2023 279  150 - 450 x10*3/uL Final    MPV 10/18/2023 11.3  7.5 - 11.5 fL Final    Thyroid Stimulating Hormone 10/18/2023 1.73  0.44 - 3.98 mIU/L Final    Vitamin D, 25-Hydroxy, Total 10/18/2023 47  30 - 100 ng/mL Final    Hemoglobin A1C 10/18/2023 5.7 (H)  see below % Final    Estimated Average Glucose 10/18/2023 117  Not Established mg/dL Final    Cholesterol 10/18/2023 181  0 - 199 mg/dL Final    HDL-Cholesterol 10/18/2023 45.1  mg/dL Final    Cholesterol/HDL Ratio 10/18/2023 4.0   Final    LDL Calculated 10/18/2023 108 (H)  <=99 mg/dL Final    VLDL 10/18/2023 28  0 - 40 mg/dL Final    Triglycerides 10/18/2023 139  0 - 149 mg/dL Final    Non HDL Cholesterol 10/18/2023 136  0 - 149 mg/dL Final         Assessment/Plan         Allergy shots have been tolerated well and they are improving the allergy symptoms.   Will proceed with the allergy immunotherapy program towards the maintenance dosage.     Continue to get your shots for the weekly buildup, wait for 30 minutes and show your arms to the allergy staff, and be sure to bring your epinephrine auto-injector to the visits.     follow-up to reassess and remake the vials in:  September '24    Asthma meds per Pulmonary      Francisco J Gates MD

## 2024-04-11 ENCOUNTER — PATIENT MESSAGE (OUTPATIENT)
Dept: OBSTETRICS AND GYNECOLOGY | Facility: CLINIC | Age: 69
End: 2024-04-11
Payer: MEDICARE

## 2024-04-11 DIAGNOSIS — J45.909 UNSPECIFIED ASTHMA, UNCOMPLICATED (HHS-HCC): ICD-10-CM

## 2024-04-11 DIAGNOSIS — R92.8 ABNORMAL MAMMOGRAM: ICD-10-CM

## 2024-04-11 DIAGNOSIS — Z12.31 VISIT FOR SCREENING MAMMOGRAM: Primary | ICD-10-CM

## 2024-04-12 ENCOUNTER — OFFICE VISIT (OUTPATIENT)
Dept: ALLERGY | Facility: CLINIC | Age: 69
End: 2024-04-12
Payer: MEDICARE

## 2024-04-12 DIAGNOSIS — J30.89 ALLERGIC RHINITIS DUE TO OTHER ALLERGIC TRIGGER, UNSPECIFIED SEASONALITY: Primary | ICD-10-CM

## 2024-04-12 PROCEDURE — 95117 IMMUNOTHERAPY INJECTIONS: CPT | Performed by: ALLERGY & IMMUNOLOGY

## 2024-04-12 PROCEDURE — 1159F MED LIST DOCD IN RCRD: CPT | Performed by: ALLERGY & IMMUNOLOGY

## 2024-04-12 PROCEDURE — 1160F RVW MEDS BY RX/DR IN RCRD: CPT | Performed by: ALLERGY & IMMUNOLOGY

## 2024-04-15 RX ORDER — FLUTICASONE PROPIONATE AND SALMETEROL XINAFOATE 115; 21 UG/1; UG/1
2 AEROSOL, METERED RESPIRATORY (INHALATION) EVERY 12 HOURS
Qty: 12 G | Refills: 11 | Status: SHIPPED | OUTPATIENT
Start: 2024-04-15

## 2024-04-17 ENCOUNTER — HOSPITAL ENCOUNTER (OUTPATIENT)
Dept: RADIOLOGY | Facility: HOSPITAL | Age: 69
Discharge: HOME | End: 2024-04-17
Payer: MEDICARE

## 2024-04-17 VITALS — BODY MASS INDEX: 28.35 KG/M2 | HEIGHT: 63 IN | WEIGHT: 160 LBS

## 2024-04-17 DIAGNOSIS — Z12.31 VISIT FOR SCREENING MAMMOGRAM: ICD-10-CM

## 2024-04-17 DIAGNOSIS — K21.9 GASTROESOPHAGEAL REFLUX DISEASE, UNSPECIFIED WHETHER ESOPHAGITIS PRESENT: ICD-10-CM

## 2024-04-17 PROCEDURE — 77063 BREAST TOMOSYNTHESIS BI: CPT | Performed by: STUDENT IN AN ORGANIZED HEALTH CARE EDUCATION/TRAINING PROGRAM

## 2024-04-17 PROCEDURE — 77067 SCR MAMMO BI INCL CAD: CPT

## 2024-04-17 PROCEDURE — 77067 SCR MAMMO BI INCL CAD: CPT | Performed by: STUDENT IN AN ORGANIZED HEALTH CARE EDUCATION/TRAINING PROGRAM

## 2024-04-17 RX ORDER — PANTOPRAZOLE SODIUM 40 MG/1
40 TABLET, DELAYED RELEASE ORAL DAILY
Qty: 90 TABLET | Refills: 1 | Status: SHIPPED | OUTPATIENT
Start: 2024-04-17

## 2024-04-19 ENCOUNTER — OFFICE VISIT (OUTPATIENT)
Dept: ALLERGY | Facility: CLINIC | Age: 69
End: 2024-04-19
Payer: MEDICARE

## 2024-04-19 DIAGNOSIS — J30.89 ALLERGIC RHINITIS DUE TO OTHER ALLERGIC TRIGGER, UNSPECIFIED SEASONALITY: Primary | ICD-10-CM

## 2024-04-19 PROCEDURE — 1159F MED LIST DOCD IN RCRD: CPT | Performed by: ALLERGY & IMMUNOLOGY

## 2024-04-19 PROCEDURE — 95117 IMMUNOTHERAPY INJECTIONS: CPT | Performed by: ALLERGY & IMMUNOLOGY

## 2024-04-19 PROCEDURE — 1160F RVW MEDS BY RX/DR IN RCRD: CPT | Performed by: ALLERGY & IMMUNOLOGY

## 2024-04-25 ENCOUNTER — HOSPITAL ENCOUNTER (OUTPATIENT)
Dept: RADIOLOGY | Facility: HOSPITAL | Age: 69
Discharge: HOME | End: 2024-04-25
Payer: MEDICARE

## 2024-04-25 DIAGNOSIS — R92.8 ABNORMAL MAMMOGRAM: ICD-10-CM

## 2024-04-25 PROCEDURE — 77061 BREAST TOMOSYNTHESIS UNI: CPT | Mod: LT

## 2024-04-25 PROCEDURE — 77065 DX MAMMO INCL CAD UNI: CPT | Mod: LEFT SIDE | Performed by: STUDENT IN AN ORGANIZED HEALTH CARE EDUCATION/TRAINING PROGRAM

## 2024-04-25 PROCEDURE — G0279 TOMOSYNTHESIS, MAMMO: HCPCS | Mod: LEFT SIDE | Performed by: STUDENT IN AN ORGANIZED HEALTH CARE EDUCATION/TRAINING PROGRAM

## 2024-04-26 ENCOUNTER — APPOINTMENT (OUTPATIENT)
Dept: ALLERGY | Facility: CLINIC | Age: 69
End: 2024-04-26
Payer: MEDICARE

## 2024-04-26 ENCOUNTER — HOSPITAL ENCOUNTER (OUTPATIENT)
Dept: CARDIOLOGY | Facility: HOSPITAL | Age: 69
Discharge: HOME | End: 2024-04-26
Payer: MEDICARE

## 2024-04-26 ENCOUNTER — APPOINTMENT (OUTPATIENT)
Dept: VASCULAR MEDICINE | Facility: HOSPITAL | Age: 69
End: 2024-04-26
Payer: MEDICARE

## 2024-04-26 ENCOUNTER — HOSPITAL ENCOUNTER (OUTPATIENT)
Dept: VASCULAR MEDICINE | Facility: HOSPITAL | Age: 69
Discharge: HOME | End: 2024-04-26
Payer: MEDICARE

## 2024-04-26 DIAGNOSIS — R60.0 BILATERAL LEG EDEMA: ICD-10-CM

## 2024-04-26 DIAGNOSIS — I65.23 CAROTID ARTERY STENOSIS, ASYMPTOMATIC, BILATERAL: ICD-10-CM

## 2024-04-26 DIAGNOSIS — I65.29 CAROTID ARTERY STENOSIS: ICD-10-CM

## 2024-04-26 PROCEDURE — 93306 TTE W/DOPPLER COMPLETE: CPT

## 2024-04-26 PROCEDURE — 93880 EXTRACRANIAL BILAT STUDY: CPT

## 2024-04-26 PROCEDURE — 93880 EXTRACRANIAL BILAT STUDY: CPT | Performed by: INTERNAL MEDICINE

## 2024-04-26 PROCEDURE — 93306 TTE W/DOPPLER COMPLETE: CPT | Performed by: INTERNAL MEDICINE

## 2024-04-30 LAB
AORTIC VALVE MEAN GRADIENT: 3 MMHG
AORTIC VALVE PEAK VELOCITY: 1.21 M/S
AV PEAK GRADIENT: 5.9 MMHG
EJECTION FRACTION APICAL 4 CHAMBER: 55
LEFT ATRIUM VOLUME AREA LENGTH INDEX BSA: 19.8 ML/M2
LEFT VENTRICLE INTERNAL DIMENSION DIASTOLE: 5.02 CM (ref 3.5–6)
LV EJECTION FRACTION BIPLANE: 59 %
MITRAL VALVE E/A RATIO: 0.88
MITRAL VALVE E/E' RATIO: 20
RIGHT VENTRICLE FREE WALL PEAK S': 11.1 CM/S
RIGHT VENTRICLE PEAK SYSTOLIC PRESSURE: 24.3 MMHG
TRICUSPID ANNULAR PLANE SYSTOLIC EXCURSION: 1.8 CM

## 2024-05-03 ENCOUNTER — OFFICE VISIT (OUTPATIENT)
Dept: ALLERGY | Facility: CLINIC | Age: 69
End: 2024-05-03
Payer: MEDICARE

## 2024-05-03 ENCOUNTER — OFFICE VISIT (OUTPATIENT)
Dept: CARDIOLOGY | Facility: HOSPITAL | Age: 69
End: 2024-05-03
Payer: MEDICARE

## 2024-05-03 VITALS
SYSTOLIC BLOOD PRESSURE: 128 MMHG | OXYGEN SATURATION: 96 % | WEIGHT: 162.92 LBS | HEART RATE: 76 BPM | DIASTOLIC BLOOD PRESSURE: 82 MMHG | BODY MASS INDEX: 28.86 KG/M2

## 2024-05-03 DIAGNOSIS — J30.89 ALLERGIC RHINITIS DUE TO OTHER ALLERGIC TRIGGER, UNSPECIFIED SEASONALITY: Primary | ICD-10-CM

## 2024-05-03 DIAGNOSIS — E07.89 THYROID MASS OF UNCLEAR ETIOLOGY: Primary | ICD-10-CM

## 2024-05-03 PROCEDURE — 1160F RVW MEDS BY RX/DR IN RCRD: CPT | Performed by: ALLERGY & IMMUNOLOGY

## 2024-05-03 PROCEDURE — 1159F MED LIST DOCD IN RCRD: CPT | Performed by: INTERNAL MEDICINE

## 2024-05-03 PROCEDURE — 1159F MED LIST DOCD IN RCRD: CPT | Performed by: ALLERGY & IMMUNOLOGY

## 2024-05-03 PROCEDURE — 95117 IMMUNOTHERAPY INJECTIONS: CPT | Performed by: ALLERGY & IMMUNOLOGY

## 2024-05-03 PROCEDURE — 99213 OFFICE O/P EST LOW 20 MIN: CPT | Performed by: INTERNAL MEDICINE

## 2024-05-06 ENCOUNTER — TELEPHONE (OUTPATIENT)
Dept: PRIMARY CARE | Facility: CLINIC | Age: 69
End: 2024-05-06
Payer: MEDICARE

## 2024-05-06 NOTE — TELEPHONE ENCOUNTER
Patient had vascular test done with Cardiologist and had a finding with her thyroid.  Can you look at her results and see if she needs to see you or see Endo?

## 2024-05-07 NOTE — PROGRESS NOTES
Primary Care Physician: Devin Novak DO   Date of Visit: 03/29/2024  9:20 AM EDT  Type of Visit: New patient visit    Chief Complaint:  Numerous, see below    HPI  Jenn Antunez 69 y.o. female who presents to [facility].    She has a number of complaints:    1.  Heart palpitations, started 1 month ago, multiple episodes daily for the last 2 weeks and then resolved.  2.  Lower extremity edema, bilateral, not constant, worse on certain days, does think it is worse with increased sodium intake,, notices it 5 days out of 7, resolves overnight  3.  Carotid artery stenosis, reports a history, no prior strokes, reports has been getting annual duplexes.    Review of Systems   12 point review of systems was obtained in detail and is negative other than that noted above or below.     Past Medical/Surgical History:  Jenn has a past medical history of Personal history of other diseases of the digestive system (09/20/2013).    Jenn has a past surgical history that includes Other surgical history (11/11/2022) and Other surgical history (11/11/2022).    Social/Family History:  Jenn reports that she has never smoked. She has never used smokeless tobacco. She reports that she does not currently use alcohol. She reports that she does not use drugs.    Jenn's family history includes Lung cancer in her father; m aunt breast cancer hx in an other family member.    Allergies:  Allergies   Allergen Reactions    Azithromycin Unknown    Erythromycin Other    Nitrofurantoin Macrocrystal Other    Nitrofurantoin Macrocrystalline Unknown     Stomach upset    Nitrofurantoin Monohyd/M-Cryst Unknown    Sulfa (Sulfonamide Antibiotics) Swelling    Sulfur Swelling       Medications:  Current Outpatient Medications on File Prior to Visit   Medication Sig    albuterol 108 (90 Base) MCG/ACT inhaler Inhale 2 puffs every 6 hours if needed for wheezing.    ascorbic acid (Vitamin C) 500 mg tablet Take 1 tablet (500 mg) by mouth once daily.     cholecalciferol (Vitamin D-3) 25 MCG (1000 UT) capsule Take 1 capsule (25 mcg) by mouth once daily.    EPINEPHrine 0.3 mg/0.3 mL injection syringe USE AS DIRECTED IN CASE OF A SEVERE ALLERGIC REACTION    fluticasone (Flonase) 50 mcg/actuation nasal spray Administer 2 sprays into each nostril once daily. Shake gently. Before first use, prime pump. After use, clean tip and replace cap.    fluticasone propion-salmeteroL (Advair HFA) 45-21 mcg/actuation inhaler Inhale 2 puffs 2 times a day.    ibuprofen 200 mg tablet Take 1 tablet (200 mg) by mouth every 6 hours.    NON FORMULARY Take 1 each by mouth once daily. Dwaine    POTASSIUM ORAL Take 1 tablet by mouth once daily.    tetrahydrozoline HCl/zinc sulf (ALLERGY RELIEF EYE DROPS OPHT) Administer 1 drop into both eyes once daily as needed.    tretinoin (Retin-A) 0.05 % cream APPLY PEA SIZED AMOUNT TO FACE EVERY NIGHT    urea 40 % lotion APPLY TO AFFECTED AREAS EVERY DAY    acetaminophen (Tylenol) 325 mg tablet Take by mouth every 4 hours if needed.    magnesium oxide-Mg AA chelate (Magnesium, oxide/AA chelate,) 300 mg capsule Take 1 capsule (300 mg) by mouth once daily.     No current facility-administered medications on file prior to visit.       Objective:   Vitals:    03/29/24 0924   BP: 115/72   Pulse: 70   SpO2: 97%       S1-S2 normal, regular rate and rhythm, no murmurs, rubs, or gallops, no carotid bruit  Clear to auscultation bilaterally    Labs and Imaging:      Latest Ref Rng & Units 7/9/2022     6:45 AM 7/10/2022    11:39 PM 9/30/2022     8:10 AM 1/31/2023     4:25 PM 3/21/2023     6:00 AM 5/22/2023     8:11 AM 3/6/2024    11:37 AM   Electrolytes   Na 136 - 145 mmol/L 139  141  142  140  139  140  142    K 3.5 - 5.3 mmol/L 4.0  4.1  4.9  4.4  4.2  4.2  4.2    Cl 98 - 107 mmol/L 105  103  105  101  102  104  104    CO2 21 - 32 mmol/L 24  28  24  30  23  28  30    Cr 0.50 - 1.05 mg/dL 0.60  0.76  0.6  0.82  0.6  0.56  0.63    Ca 8.6 - 10.3 mg/dL 9.1  9.7   9.0  9.7  9.5  9.3  9.3    Phos 2.5 - 4.9 mg/dL 4.0                Latest Ref Rng & Units 7/8/2022     8:19 AM 7/9/2022     6:45 AM 7/10/2022    11:39 PM 9/30/2022     8:10 AM 5/13/2023    11:10 AM 5/22/2023     8:11 AM 10/18/2023     8:40 AM   CBC   Hb 12.0 - 16.0 g/dL 13.3  12.1  12.3  13.0  13.1  12.6  12.6    Hct 36.0 - 46.0 % 39.8  36.3  37.0  40.0  39.6  40.1  39.1    MCV 80 - 100 fL 90  90  90  92.8  93.2  95  94    RDW 11.5 - 14.5 % 14.0  13.8  14.0    14.6  14.0          Latest Ref Rng & Units 7/8/2022     8:19 AM 7/9/2022     6:45 AM 7/10/2022    11:39 PM 3/21/2023     6:00 AM 5/22/2023     8:11 AM 10/18/2023     8:40 AM 3/6/2024    11:37 AM   Lipids   Chol 0 - 199 mg/dL    329   181     HDL mg/dL    64   45.1     LDL <=99 mg/dL    248   108     Trig 0 - 149 mg/dL    86   139     ALT 7 - 45 U/L 60  51  46  26  22   60    AST 9 - 39 U/L 37  30  27  26  18   41          Latest Ref Rng & Units 6/16/2018    11:00 AM 3/11/2020     7:40 PM 7/29/2021     8:47 AM 4/5/2022     4:58 PM 5/13/2023    11:10 AM 10/18/2023     8:40 AM   Thyroid   TSH 0.44 - 3.98 mIU/L 1.76  2.96  1.98  1.06  1.57  1.73           No data to display                 Lab Results   Component Value Date    HGBA1C 5.7 (H) 10/18/2023        The 10-year ASCVD risk score (Nuria DURAN, et al., 2019) is: 9.6%    Values used to calculate the score:      Age: 69 years      Sex: Female      Is Non- : No      Diabetic: No      Tobacco smoker: No      Systolic Blood Pressure: 128 mmHg      Is BP treated: No      HDL Cholesterol: 45.1 mg/dL      Total Cholesterol: 181 mg/dL     Echocardiogram: No results found for this or any previous visit.     Echocardiogram:   PHYSICIAN INTERPRETATION:  Left Ventricle: The left ventricular systolic function is normal, with an estimated ejection fraction of 65-70%. There are no regional wall motion abnormalities. The left ventricular cavity size is normal. There is mild left ventricular hypertrophy  involving the basal wall and septal wall. Spectral Doppler shows an impaired relaxation pattern of left ventricular diastolic filling.  Left Atrium: The left atrium is upper limits of normal in size.  Right Ventricle: The right ventricle is normal in size. There is normal right ventricular global systolic function.  Right Atrium: The right atrium is normal in size.  Aortic Valve: The aortic valve appears structurally normal. There is no evidence of aortic valve regurgitation. The peak instantaneous gradient of the aortic valve is 12.1 mmHg. The mean gradient of the aortic valve is 7.0 mmHg.  Mitral Valve: The mitral valve is normal in structure. There is trace to mild mitral valve regurgitation.  Tricuspid Valve: The tricuspid valve is structurally normal. There is mild tricuspid regurgitation.  Pulmonic Valve: The pulmonic valve is structurally normal. There is physiologic pulmonic valve regurgitation.  Pericardium: There is a trivial pericardial effusion posterior to the left ventricle.  Aorta: The aortic root is normal.  Pulmonary Artery: The tricuspid regurgitant velocity is 2.68 m/s, and with an estimated right atrial pressure of 3 mmHg, the estimated pulmonary artery pressure is normal with the RVSP at 28.4 mmHg.  Pulmonary Veins: The pulmonary veins appear normal and return normally to the left atrium.  Systemic Veins: The inferior vena cava appears to be of normal size.      CONCLUSIONS:  1. The left ventricular systolic function is normal with a 65-70% estimated ejection fraction.  2. Spectral Doppler shows an impaired relaxation pattern of left ventricular diastolic filling.      Stress Testing: No results found for this or any previous visit from the past 1825 days.    Cardiac Catheterization: No results found for this or any previous visit from the past 1825 days.    Cardiac Scoring: No results found for this or any previous visit from the past 1825 days.    AAA : No results found for this or any  previous visit from the past 1825 days.    OTHER: No results found for this or any previous visit from the past 1825 days.        Assessment/Plan:   1. Bilateral leg edema  Transthoracic echo (TTE) complete      2. Carotid artery stenosis  Vascular US carotid artery duplex bilateral      3. Carotid artery stenosis, asymptomatic, bilateral  Vascular US carotid artery duplex bilateral           Jenn Antunez 69 y.o. female who presents to establish care:    1.  Bilateral lower extremity edema  2.  Asymptomatic carotid artery stenosis  3.  Palpitations    I do not appreciate a great deal of lower extremity edema today; will check a transthoracic echocardiogram to ensure normal structure and function of her heart with normal filling pressures.  Will not pursue further monitoring of her palpitations, given that they have not resolved.  Reviewed EKG, normal sinus rhythm.    Check carotid artery duplex.    Follow-up after testing.    Time Spent: I spent 30 minutes reviewing medical testing, obtaining medical history and counselling and educating on diagnosis and documenting clinical encounter.         ____________________________________________________________  Luis Goodson MD

## 2024-05-10 ENCOUNTER — OFFICE VISIT (OUTPATIENT)
Dept: ALLERGY | Facility: CLINIC | Age: 69
End: 2024-05-10
Payer: MEDICARE

## 2024-05-10 DIAGNOSIS — J30.89 ALLERGIC RHINITIS DUE TO OTHER ALLERGIC TRIGGER, UNSPECIFIED SEASONALITY: Primary | ICD-10-CM

## 2024-05-10 PROCEDURE — 95117 IMMUNOTHERAPY INJECTIONS: CPT | Performed by: ALLERGY & IMMUNOLOGY

## 2024-05-10 PROCEDURE — 1159F MED LIST DOCD IN RCRD: CPT | Performed by: ALLERGY & IMMUNOLOGY

## 2024-05-10 PROCEDURE — 1160F RVW MEDS BY RX/DR IN RCRD: CPT | Performed by: ALLERGY & IMMUNOLOGY

## 2024-05-10 NOTE — PROGRESS NOTES
See Allergy Shot Immunotherapy Record Book  Allergy checklist done, no concerns      Subjective   Patient ID:   [id number]   Jenn Antunez is a 69 y.o. female who presents for No chief complaint on file..    No chief complaint on file.         HPI  This patient is here to evaluate for:      Review of Systems      Objective     There were no vitals taken for this visit.     Physical Exam       Current Outpatient Medications   Medication Sig Dispense Refill    acetaminophen (Tylenol) 325 mg tablet Take by mouth every 4 hours if needed.      albuterol 108 (90 Base) MCG/ACT inhaler Inhale 2 puffs every 6 hours if needed for wheezing.      ascorbic acid (Vitamin C) 500 mg tablet Take 1 tablet (500 mg) by mouth once daily.      cholecalciferol (Vitamin D-3) 25 MCG (1000 UT) capsule Take 1 capsule (25 mcg) by mouth once daily.      EPINEPHrine 0.3 mg/0.3 mL injection syringe USE AS DIRECTED IN CASE OF A SEVERE ALLERGIC REACTION      fluticasone (Flonase) 50 mcg/actuation nasal spray Administer 2 sprays into each nostril once daily. Shake gently. Before first use, prime pump. After use, clean tip and replace cap.      fluticasone propion-salmeteroL (Advair HFA) 115-21 mcg/actuation inhaler INHALE 2 PUFFS BY MOUTH EVERY 12 HOURS IN THE MORNING AND IN THE EVENING 12 g 11    fluticasone propion-salmeteroL (Advair HFA) 45-21 mcg/actuation inhaler Inhale 2 puffs 2 times a day. 12 g 11    ibuprofen 200 mg tablet Take 1 tablet (200 mg) by mouth every 6 hours.      magnesium oxide-Mg AA chelate (Magnesium, oxide/AA chelate,) 300 mg capsule Take 1 capsule (300 mg) by mouth once daily.      NON FORMULARY Take 1 each by mouth once daily. Dwiane      pantoprazole (ProtoNix) 40 mg EC tablet TAKE 1 TABLET BY MOUTH EVERY DAY 90 tablet 1    POTASSIUM ORAL Take 1 tablet by mouth once daily.      tetrahydrozoline HCl/zinc sulf (ALLERGY RELIEF EYE DROPS OPHT) Administer 1 drop into both eyes once daily as needed.      tretinoin (Retin-A)  0.05 % cream APPLY PEA SIZED AMOUNT TO FACE EVERY NIGHT      urea 40 % lotion APPLY TO AFFECTED AREAS EVERY DAY       No current facility-administered medications for this visit.       Summary of the labs over the past 6 months:    Hospital Outpatient Visit on 04/26/2024   Component Date Value Ref Range Status    AV pk margaret 04/26/2024 1.21  m/s Final    AV mn grad 04/26/2024 3.0  mmHg Final    LV Biplane EF 04/26/2024 59  % Final    MV avg E/e' ratio 04/26/2024 20.00   Final    MV E/A ratio 04/26/2024 0.88   Final    LA vol index A/L 04/26/2024 19.8  ml/m2 Final    Tricuspid annular plane systolic e* 04/26/2024 1.8  cm Final    RV free wall pk S' 04/26/2024 11.10  cm/s Final    LVIDd 04/26/2024 5.02  cm Final    RVSP 04/26/2024 24.3  mmHg Final    AV pk grad 04/26/2024 5.9  mmHg Final    LV A4C EF 04/26/2024 55.0   Final   Lab on 03/06/2024   Component Date Value Ref Range Status    Glucose 03/06/2024 103 (H)  74 - 99 mg/dL Final    Sodium 03/06/2024 142  136 - 145 mmol/L Final    Potassium 03/06/2024 4.2  3.5 - 5.3 mmol/L Final    Chloride 03/06/2024 104  98 - 107 mmol/L Final    Bicarbonate 03/06/2024 30  21 - 32 mmol/L Final    Anion Gap 03/06/2024 12  10 - 20 mmol/L Final    Urea Nitrogen 03/06/2024 10  6 - 23 mg/dL Final    Creatinine 03/06/2024 0.63  0.50 - 1.05 mg/dL Final    eGFR 03/06/2024 >90  >60 mL/min/1.73m*2 Final    Calcium 03/06/2024 9.3  8.6 - 10.3 mg/dL Final    Albumin 03/06/2024 4.1  3.4 - 5.0 g/dL Final    Alkaline Phosphatase 03/06/2024 68  33 - 136 U/L Final    Total Protein 03/06/2024 6.2 (L)  6.4 - 8.2 g/dL Final    AST 03/06/2024 41 (H)  9 - 39 U/L Final    Bilirubin, Total 03/06/2024 0.6  0.0 - 1.2 mg/dL Final    ALT 03/06/2024 60 (H)  7 - 45 U/L Final    Creatine Kinase 03/06/2024 82  0 - 215 U/L Final    Magnesium 03/06/2024 1.96  1.60 - 2.40 mg/dL Final   Lab Requisition on 02/27/2024   Component Date Value Ref Range Status    Case Report 02/27/2024    Final                     Value:Surgical Pathology                                [id number]: [id number]                                  Authorizing Provider:  Ankit Marvin MD     Collected:           02/27/2024 1400              Ordering Location:     [facility]       Received:            02/28/2024 1257                                     Center                                                                       Pathologist:           Julieta Rodriguez MD                                                          Specimen:    EYELID RIGHT, RIGHT LOWER LID                                                              FINAL DIAGNOSIS 02/27/2024    Final                    Value:This result contains rich text formatting which cannot be displayed here.      02/27/2024    Final                    Value:This result contains rich text formatting which cannot be displayed here.    Clinical History 02/27/2024    Final                    Value:This result contains rich text formatting which cannot be displayed here.    Gross Description 02/27/2024    Final                    Value:This result contains rich text formatting which cannot be displayed here.   Lab Requisition on 01/31/2024   Component Date Value Ref Range Status    Case Report 01/31/2024    Final                    Value:Surgical Pathology                                Case: [id number]                                  Authorizing Provider:  Cherise Ramirez MD            Collected:           01/31/2024 1035              Ordering Location:     [facility]       Received:            02/01/2024 0618                                     [place]                                                                       Pathologist:           Francis Grande MD                                                       Specimen:    ESOPHAGUS MID BIOPSY, esophagus, middle third, cold forcep, biopsy                         FINAL DIAGNOSIS 01/31/2024    Final                    Value:This  result contains rich text formatting which cannot be displayed here.      01/31/2024    Final                    Value:This result contains rich text formatting which cannot be displayed here.    Clinical History 01/31/2024    Final                    Value:This result contains rich text formatting which cannot be displayed here.    Gross Description 01/31/2024    Final                    Value:This result contains rich text formatting which cannot be displayed here.         Assessment/Plan           Francisco J Gates MD

## 2024-05-10 NOTE — PROGRESS NOTES
Subjective   Patient ID:   [id number]   Jenn Antunez is a 69 y.o. female who presents for No chief complaint on file..    No chief complaint on file.         HPI  This patient is here to evaluate for:      Review of Systems      Objective     There were no vitals taken for this visit.     Physical Exam       Current Outpatient Medications   Medication Sig Dispense Refill    acetaminophen (Tylenol) 325 mg tablet Take by mouth every 4 hours if needed.      albuterol 108 (90 Base) MCG/ACT inhaler Inhale 2 puffs every 6 hours if needed for wheezing.      ascorbic acid (Vitamin C) 500 mg tablet Take 1 tablet (500 mg) by mouth once daily.      cholecalciferol (Vitamin D-3) 25 MCG (1000 UT) capsule Take 1 capsule (25 mcg) by mouth once daily.      EPINEPHrine 0.3 mg/0.3 mL injection syringe USE AS DIRECTED IN CASE OF A SEVERE ALLERGIC REACTION      fluticasone (Flonase) 50 mcg/actuation nasal spray Administer 2 sprays into each nostril once daily. Shake gently. Before first use, prime pump. After use, clean tip and replace cap.      fluticasone propion-salmeteroL (Advair HFA) 115-21 mcg/actuation inhaler INHALE 2 PUFFS BY MOUTH EVERY 12 HOURS IN THE MORNING AND IN THE EVENING 12 g 11    fluticasone propion-salmeteroL (Advair HFA) 45-21 mcg/actuation inhaler Inhale 2 puffs 2 times a day. 12 g 11    ibuprofen 200 mg tablet Take 1 tablet (200 mg) by mouth every 6 hours.      magnesium oxide-Mg AA chelate (Magnesium, oxide/AA chelate,) 300 mg capsule Take 1 capsule (300 mg) by mouth once daily.      NON FORMULARY Take 1 each by mouth once daily. Dwaine      pantoprazole (ProtoNix) 40 mg EC tablet TAKE 1 TABLET BY MOUTH EVERY DAY 90 tablet 1    POTASSIUM ORAL Take 1 tablet by mouth once daily.      tetrahydrozoline HCl/zinc sulf (ALLERGY RELIEF EYE DROPS OPHT) Administer 1 drop into both eyes once daily as needed.      tretinoin (Retin-A) 0.05 % cream APPLY PEA SIZED AMOUNT TO FACE EVERY NIGHT      urea 40 % lotion APPLY TO  AFFECTED AREAS EVERY DAY       No current facility-administered medications for this visit.       Summary of the labs over the past 6 months:    Lab on 03/06/2024   Component Date Value Ref Range Status    Glucose 03/06/2024 103 (H)  74 - 99 mg/dL Final    Sodium 03/06/2024 142  136 - 145 mmol/L Final    Potassium 03/06/2024 4.2  3.5 - 5.3 mmol/L Final    Chloride 03/06/2024 104  98 - 107 mmol/L Final    Bicarbonate 03/06/2024 30  21 - 32 mmol/L Final    Anion Gap 03/06/2024 12  10 - 20 mmol/L Final    Urea Nitrogen 03/06/2024 10  6 - 23 mg/dL Final    Creatinine 03/06/2024 0.63  0.50 - 1.05 mg/dL Final    eGFR 03/06/2024 >90  >60 mL/min/1.73m*2 Final    Calcium 03/06/2024 9.3  8.6 - 10.3 mg/dL Final    Albumin 03/06/2024 4.1  3.4 - 5.0 g/dL Final    Alkaline Phosphatase 03/06/2024 68  33 - 136 U/L Final    Total Protein 03/06/2024 6.2 (L)  6.4 - 8.2 g/dL Final    AST 03/06/2024 41 (H)  9 - 39 U/L Final    Bilirubin, Total 03/06/2024 0.6  0.0 - 1.2 mg/dL Final    ALT 03/06/2024 60 (H)  7 - 45 U/L Final    Creatine Kinase 03/06/2024 82  0 - 215 U/L Final    Magnesium 03/06/2024 1.96  1.60 - 2.40 mg/dL Final   Lab Requisition on 02/27/2024   Component Date Value Ref Range Status    Case Report 02/27/2024    Final                    Value:Surgical Pathology                                Case: [id number]                                  Authorizing Provider:  Ankit Marvin MD     Collected:           02/27/2024 1400              Ordering Location:     [facility]       Received:            02/28/2024 [facility]                                     Center                                                                       Pathologist:           Julieta Rodriguez MD                                                          Specimen:    EYELID RIGHT, RIGHT LOWER LID                                                              FINAL DIAGNOSIS 02/27/2024    Final                    Value:This result contains rich  text formatting which cannot be displayed here.      02/27/2024    Final                    Value:This result contains rich text formatting which cannot be displayed here.    Clinical History 02/27/2024    Final                    Value:This result contains rich text formatting which cannot be displayed here.    Gross Description 02/27/2024    Final                    Value:This result contains rich text formatting which cannot be displayed here.   Lab Requisition on 01/31/2024   Component Date Value Ref Range Status    Case Report 01/31/2024    Final                    Value:Surgical Pathology                                Case: [id number]                                  Authorizing Provider:  Cherise Ramirez MD            Collected:           01/31/2024 1035              Ordering Location:     [facility]       Received:            02/01/2024 0618                                     Center                                                                       Pathologist:           Francis Grande MD                                                       Specimen:    ESOPHAGUS MID BIOPSY, esophagus, middle third, cold forcep, biopsy                         FINAL DIAGNOSIS 01/31/2024    Final                    Value:This result contains rich text formatting which cannot be displayed here.      01/31/2024    Final                    Value:This result contains rich text formatting which cannot be displayed here.    Clinical History 01/31/2024    Final                    Value:This result contains rich text formatting which cannot be displayed here.    Gross Description 01/31/2024    Final                    Value:This result contains rich text formatting which cannot be displayed here.   Lab on 10/18/2023   Component Date Value Ref Range Status    WBC 10/18/2023 6.5  4.4 - 11.3 x10*3/uL Final    nRBC 10/18/2023 0.0  0.0 - 0.0 /100 WBCs Final    RBC 10/18/2023 4.17  4.00 - 5.20 x10*6/uL Final    Hemoglobin  10/18/2023 12.6  12.0 - 16.0 g/dL Final    Hematocrit 10/18/2023 39.1  36.0 - 46.0 % Final    MCV 10/18/2023 94  80 - 100 fL Final    MCH 10/18/2023 30.2  26.0 - 34.0 pg Final    MCHC 10/18/2023 32.2  32.0 - 36.0 g/dL Final    RDW 10/18/2023 14.0  11.5 - 14.5 % Final    Platelets 10/18/2023 279  150 - 450 x10*3/uL Final    MPV 10/18/2023 11.3  7.5 - 11.5 fL Final    Thyroid Stimulating Hormone 10/18/2023 1.73  0.44 - 3.98 mIU/L Final    Vitamin D, 25-Hydroxy, Total 10/18/2023 47  30 - 100 ng/mL Final    Hemoglobin A1C 10/18/2023 5.7 (H)  see below % Final    Estimated Average Glucose 10/18/2023 117  Not Established mg/dL Final    Cholesterol 10/18/2023 181  0 - 199 mg/dL Final    HDL-Cholesterol 10/18/2023 45.1  mg/dL Final    Cholesterol/HDL Ratio 10/18/2023 4.0   Final    LDL Calculated 10/18/2023 108 (H)  <=99 mg/dL Final    VLDL 10/18/2023 28  0 - 40 mg/dL Final    Triglycerides 10/18/2023 139  0 - 149 mg/dL Final    Non HDL Cholesterol 10/18/2023 136  0 - 149 mg/dL Final         Assessment/Plan           Francisco J Gates MD

## 2024-05-10 NOTE — PROGRESS NOTES
Subjective   Patient ID:   [id number]   Jenn Antunez is a 69 y.o. female who presents for No chief complaint on file..    No chief complaint on file.         HPI  This patient is here to evaluate for:      Review of Systems      Objective     There were no vitals taken for this visit.     Physical Exam       Current Outpatient Medications   Medication Sig Dispense Refill    acetaminophen (Tylenol) 325 mg tablet Take by mouth every 4 hours if needed.      albuterol 108 (90 Base) MCG/ACT inhaler Inhale 2 puffs every 6 hours if needed for wheezing.      ascorbic acid (Vitamin C) 500 mg tablet Take 1 tablet (500 mg) by mouth once daily.      cholecalciferol (Vitamin D-3) 25 MCG (1000 UT) capsule Take 1 capsule (25 mcg) by mouth once daily.      EPINEPHrine 0.3 mg/0.3 mL injection syringe USE AS DIRECTED IN CASE OF A SEVERE ALLERGIC REACTION      fluticasone (Flonase) 50 mcg/actuation nasal spray Administer 2 sprays into each nostril once daily. Shake gently. Before first use, prime pump. After use, clean tip and replace cap.      fluticasone propion-salmeteroL (Advair HFA) 115-21 mcg/actuation inhaler INHALE 2 PUFFS BY MOUTH EVERY 12 HOURS IN THE MORNING AND IN THE EVENING 12 g 11    fluticasone propion-salmeteroL (Advair HFA) 45-21 mcg/actuation inhaler Inhale 2 puffs 2 times a day. 12 g 11    ibuprofen 200 mg tablet Take 1 tablet (200 mg) by mouth every 6 hours.      magnesium oxide-Mg AA chelate (Magnesium, oxide/AA chelate,) 300 mg capsule Take 1 capsule (300 mg) by mouth once daily.      NON FORMULARY Take 1 each by mouth once daily. Dwaine      pantoprazole (ProtoNix) 40 mg EC tablet TAKE 1 TABLET BY MOUTH EVERY DAY 90 tablet 1    POTASSIUM ORAL Take 1 tablet by mouth once daily.      tetrahydrozoline HCl/zinc sulf (ALLERGY RELIEF EYE DROPS OPHT) Administer 1 drop into both eyes once daily as needed.      tretinoin (Retin-A) 0.05 % cream APPLY PEA SIZED AMOUNT TO FACE EVERY NIGHT      urea 40 % lotion APPLY TO  AFFECTED AREAS EVERY DAY       No current facility-administered medications for this visit.       Summary of the labs over the past 6 months:    Lab on 03/06/2024   Component Date Value Ref Range Status    Glucose 03/06/2024 103 (H)  74 - 99 mg/dL Final    Sodium 03/06/2024 142  136 - 145 mmol/L Final    Potassium 03/06/2024 4.2  3.5 - 5.3 mmol/L Final    Chloride 03/06/2024 104  98 - 107 mmol/L Final    Bicarbonate 03/06/2024 30  21 - 32 mmol/L Final    Anion Gap 03/06/2024 12  10 - 20 mmol/L Final    Urea Nitrogen 03/06/2024 10  6 - 23 mg/dL Final    Creatinine 03/06/2024 0.63  0.50 - 1.05 mg/dL Final    eGFR 03/06/2024 >90  >60 mL/min/1.73m*2 Final    Calcium 03/06/2024 9.3  8.6 - 10.3 mg/dL Final    Albumin 03/06/2024 4.1  3.4 - 5.0 g/dL Final    Alkaline Phosphatase 03/06/2024 68  33 - 136 U/L Final    Total Protein 03/06/2024 6.2 (L)  6.4 - 8.2 g/dL Final    AST 03/06/2024 41 (H)  9 - 39 U/L Final    Bilirubin, Total 03/06/2024 0.6  0.0 - 1.2 mg/dL Final    ALT 03/06/2024 60 (H)  7 - 45 U/L Final    Creatine Kinase 03/06/2024 82  0 - 215 U/L Final    Magnesium 03/06/2024 1.96  1.60 - 2.40 mg/dL Final   Lab Requisition on 02/27/2024   Component Date Value Ref Range Status    Case Report 02/27/2024    Final                    Value:Surgical Pathology                                Case: [id number]                                  Authorizing Provider:  Ankit Marvin MD     Collected:           02/27/2024 1400              Ordering Location:     [facility]       Received:            02/28/2024 [facility]                                     Center                                                                       Pathologist:           Julieta Rodriguez MD                                                          Specimen:    EYELID RIGHT, RIGHT LOWER LID                                                              FINAL DIAGNOSIS 02/27/2024    Final                    Value:This result contains rich  text formatting which cannot be displayed here.      02/27/2024    Final                    Value:This result contains rich text formatting which cannot be displayed here.    Clinical History 02/27/2024    Final                    Value:This result contains rich text formatting which cannot be displayed here.    Gross Description 02/27/2024    Final                    Value:This result contains rich text formatting which cannot be displayed here.   Lab Requisition on 01/31/2024   Component Date Value Ref Range Status    Case Report 01/31/2024    Final                    Value:Surgical Pathology                                Case: [id number]                                  Authorizing Provider:  Cherise Ramirez MD            Collected:           01/31/2024 1035              Ordering Location:     [facility]       Received:            02/01/2024 0618                                     Center                                                                       Pathologist:           Francis Grande MD                                                       Specimen:    ESOPHAGUS MID BIOPSY, esophagus, middle third, cold forcep, biopsy                         FINAL DIAGNOSIS 01/31/2024    Final                    Value:This result contains rich text formatting which cannot be displayed here.      01/31/2024    Final                    Value:This result contains rich text formatting which cannot be displayed here.    Clinical History 01/31/2024    Final                    Value:This result contains rich text formatting which cannot be displayed here.    Gross Description 01/31/2024    Final                    Value:This result contains rich text formatting which cannot be displayed here.   Lab on 10/18/2023   Component Date Value Ref Range Status    WBC 10/18/2023 6.5  4.4 - 11.3 x10*3/uL Final    nRBC 10/18/2023 0.0  0.0 - 0.0 /100 WBCs Final    RBC 10/18/2023 4.17  4.00 - 5.20 x10*6/uL Final    Hemoglobin  10/18/2023 12.6  12.0 - 16.0 g/dL Final    Hematocrit 10/18/2023 39.1  36.0 - 46.0 % Final    MCV 10/18/2023 94  80 - 100 fL Final    MCH 10/18/2023 30.2  26.0 - 34.0 pg Final    MCHC 10/18/2023 32.2  32.0 - 36.0 g/dL Final    RDW 10/18/2023 14.0  11.5 - 14.5 % Final    Platelets 10/18/2023 279  150 - 450 x10*3/uL Final    MPV 10/18/2023 11.3  7.5 - 11.5 fL Final    Thyroid Stimulating Hormone 10/18/2023 1.73  0.44 - 3.98 mIU/L Final    Vitamin D, 25-Hydroxy, Total 10/18/2023 47  30 - 100 ng/mL Final    Hemoglobin A1C 10/18/2023 5.7 (H)  see below % Final    Estimated Average Glucose 10/18/2023 117  Not Established mg/dL Final    Cholesterol 10/18/2023 181  0 - 199 mg/dL Final    HDL-Cholesterol 10/18/2023 45.1  mg/dL Final    Cholesterol/HDL Ratio 10/18/2023 4.0   Final    LDL Calculated 10/18/2023 108 (H)  <=99 mg/dL Final    VLDL 10/18/2023 28  0 - 40 mg/dL Final    Triglycerides 10/18/2023 139  0 - 149 mg/dL Final    Non HDL Cholesterol 10/18/2023 136  0 - 149 mg/dL Final         Assessment/Plan           Francisco J Gates MD

## 2024-05-11 NOTE — PROGRESS NOTES
Subjective   Patient ID:   [id number]   Jenn Antunez is a 69 y.o. female who presents for No chief complaint on file..    No chief complaint on file.         HPI  This patient is here to evaluate for:      Review of Systems      Objective     There were no vitals taken for this visit.     Physical Exam       Current Outpatient Medications   Medication Sig Dispense Refill    acetaminophen (Tylenol) 325 mg tablet Take by mouth every 4 hours if needed.      albuterol 108 (90 Base) MCG/ACT inhaler Inhale 2 puffs every 6 hours if needed for wheezing.      ascorbic acid (Vitamin C) 500 mg tablet Take 1 tablet (500 mg) by mouth once daily.      cholecalciferol (Vitamin D-3) 25 MCG (1000 UT) capsule Take 1 capsule (25 mcg) by mouth once daily.      EPINEPHrine 0.3 mg/0.3 mL injection syringe USE AS DIRECTED IN CASE OF A SEVERE ALLERGIC REACTION      fluticasone (Flonase) 50 mcg/actuation nasal spray Administer 2 sprays into each nostril once daily. Shake gently. Before first use, prime pump. After use, clean tip and replace cap.      fluticasone propion-salmeteroL (Advair HFA) 115-21 mcg/actuation inhaler INHALE 2 PUFFS BY MOUTH EVERY 12 HOURS IN THE MORNING AND IN THE EVENING 12 g 11    fluticasone propion-salmeteroL (Advair HFA) 45-21 mcg/actuation inhaler Inhale 2 puffs 2 times a day. 12 g 11    ibuprofen 200 mg tablet Take 1 tablet (200 mg) by mouth every 6 hours.      magnesium oxide-Mg AA chelate (Magnesium, oxide/AA chelate,) 300 mg capsule Take 1 capsule (300 mg) by mouth once daily.      NON FORMULARY Take 1 each by mouth once daily. Dwaine      pantoprazole (ProtoNix) 40 mg EC tablet TAKE 1 TABLET BY MOUTH EVERY DAY 90 tablet 1    POTASSIUM ORAL Take 1 tablet by mouth once daily.      tetrahydrozoline HCl/zinc sulf (ALLERGY RELIEF EYE DROPS OPHT) Administer 1 drop into both eyes once daily as needed.      tretinoin (Retin-A) 0.05 % cream APPLY PEA SIZED AMOUNT TO FACE EVERY NIGHT      urea 40 % lotion APPLY TO  AFFECTED AREAS EVERY DAY       No current facility-administered medications for this visit.       Summary of the labs over the past 6 months:    Lab on 03/06/2024   Component Date Value Ref Range Status    Glucose 03/06/2024 103 (H)  74 - 99 mg/dL Final    Sodium 03/06/2024 142  136 - 145 mmol/L Final    Potassium 03/06/2024 4.2  3.5 - 5.3 mmol/L Final    Chloride 03/06/2024 104  98 - 107 mmol/L Final    Bicarbonate 03/06/2024 30  21 - 32 mmol/L Final    Anion Gap 03/06/2024 12  10 - 20 mmol/L Final    Urea Nitrogen 03/06/2024 10  6 - 23 mg/dL Final    Creatinine 03/06/2024 0.63  0.50 - 1.05 mg/dL Final    eGFR 03/06/2024 >90  >60 mL/min/1.73m*2 Final    Calcium 03/06/2024 9.3  8.6 - 10.3 mg/dL Final    Albumin 03/06/2024 4.1  3.4 - 5.0 g/dL Final    Alkaline Phosphatase 03/06/2024 68  33 - 136 U/L Final    Total Protein 03/06/2024 6.2 (L)  6.4 - 8.2 g/dL Final    AST 03/06/2024 41 (H)  9 - 39 U/L Final    Bilirubin, Total 03/06/2024 0.6  0.0 - 1.2 mg/dL Final    ALT 03/06/2024 60 (H)  7 - 45 U/L Final    Creatine Kinase 03/06/2024 82  0 - 215 U/L Final    Magnesium 03/06/2024 1.96  1.60 - 2.40 mg/dL Final   Lab Requisition on 02/27/2024   Component Date Value Ref Range Status    Case Report 02/27/2024    Final                    Value:Surgical Pathology                                Case: [id number]                                  Authorizing Provider:  Ankit Marvin MD     Collected:           02/27/2024 1400              Ordering Location:     [facility]       Received:            02/28/2024 [facility]                                     Center                                                                       Pathologist:           Julieta Rodriguez MD                                                          Specimen:    EYELID RIGHT, RIGHT LOWER LID                                                              FINAL DIAGNOSIS 02/27/2024    Final                    Value:This result contains rich  text formatting which cannot be displayed here.      02/27/2024    Final                    Value:This result contains rich text formatting which cannot be displayed here.    Clinical History 02/27/2024    Final                    Value:This result contains rich text formatting which cannot be displayed here.    Gross Description 02/27/2024    Final                    Value:This result contains rich text formatting which cannot be displayed here.   Lab Requisition on 01/31/2024   Component Date Value Ref Range Status    Case Report 01/31/2024    Final                    Value:Surgical Pathology                                Case: [id number]                                  Authorizing Provider:  Cherise Ramirez MD            Collected:           01/31/2024 1035              Ordering Location:     [facility]       Received:            02/01/2024 0618                                     Center                                                                       Pathologist:           Francis Grande MD                                                       Specimen:    ESOPHAGUS MID BIOPSY, esophagus, middle third, cold forcep, biopsy                         FINAL DIAGNOSIS 01/31/2024    Final                    Value:This result contains rich text formatting which cannot be displayed here.      01/31/2024    Final                    Value:This result contains rich text formatting which cannot be displayed here.    Clinical History 01/31/2024    Final                    Value:This result contains rich text formatting which cannot be displayed here.    Gross Description 01/31/2024    Final                    Value:This result contains rich text formatting which cannot be displayed here.   Lab on 10/18/2023   Component Date Value Ref Range Status    WBC 10/18/2023 6.5  4.4 - 11.3 x10*3/uL Final    nRBC 10/18/2023 0.0  0.0 - 0.0 /100 WBCs Final    RBC 10/18/2023 4.17  4.00 - 5.20 x10*6/uL Final    Hemoglobin  10/18/2023 12.6  12.0 - 16.0 g/dL Final    Hematocrit 10/18/2023 39.1  36.0 - 46.0 % Final    MCV 10/18/2023 94  80 - 100 fL Final    MCH 10/18/2023 30.2  26.0 - 34.0 pg Final    MCHC 10/18/2023 32.2  32.0 - 36.0 g/dL Final    RDW 10/18/2023 14.0  11.5 - 14.5 % Final    Platelets 10/18/2023 279  150 - 450 x10*3/uL Final    MPV 10/18/2023 11.3  7.5 - 11.5 fL Final    Thyroid Stimulating Hormone 10/18/2023 1.73  0.44 - 3.98 mIU/L Final    Vitamin D, 25-Hydroxy, Total 10/18/2023 47  30 - 100 ng/mL Final    Hemoglobin A1C 10/18/2023 5.7 (H)  see below % Final    Estimated Average Glucose 10/18/2023 117  Not Established mg/dL Final    Cholesterol 10/18/2023 181  0 - 199 mg/dL Final    HDL-Cholesterol 10/18/2023 45.1  mg/dL Final    Cholesterol/HDL Ratio 10/18/2023 4.0   Final    LDL Calculated 10/18/2023 108 (H)  <=99 mg/dL Final    VLDL 10/18/2023 28  0 - 40 mg/dL Final    Triglycerides 10/18/2023 139  0 - 149 mg/dL Final    Non HDL Cholesterol 10/18/2023 136  0 - 149 mg/dL Final         Assessment/Plan           Francisco J Gates MD

## 2024-05-16 ENCOUNTER — APPOINTMENT (OUTPATIENT)
Dept: RADIOLOGY | Facility: HOSPITAL | Age: 69
End: 2024-05-16
Payer: MEDICARE

## 2024-05-17 ENCOUNTER — APPOINTMENT (OUTPATIENT)
Dept: ALLERGY | Facility: CLINIC | Age: 69
End: 2024-05-17
Payer: MEDICARE

## 2024-05-17 DIAGNOSIS — J30.89 ALLERGIC RHINITIS DUE TO OTHER ALLERGIC TRIGGER, UNSPECIFIED SEASONALITY: Primary | ICD-10-CM

## 2024-05-17 PROCEDURE — 95117 IMMUNOTHERAPY INJECTIONS: CPT | Performed by: ALLERGY & IMMUNOLOGY

## 2024-05-21 ENCOUNTER — TELEPHONE (OUTPATIENT)
Dept: PRIMARY CARE | Facility: CLINIC | Age: 69
End: 2024-05-21
Payer: MEDICARE

## 2024-05-21 ENCOUNTER — TELEPHONE (OUTPATIENT)
Dept: ALLERGY | Facility: CLINIC | Age: 69
End: 2024-05-21

## 2024-05-21 DIAGNOSIS — J01.90 ACUTE BACTERIAL SINUSITIS: Primary | ICD-10-CM

## 2024-05-21 DIAGNOSIS — B96.89 ACUTE BACTERIAL SINUSITIS: Primary | ICD-10-CM

## 2024-05-21 RX ORDER — AMOXICILLIN 500 MG/1
500 CAPSULE ORAL EVERY 12 HOURS SCHEDULED
Qty: 14 CAPSULE | Refills: 0 | Status: SHIPPED | OUTPATIENT
Start: 2024-05-21 | End: 2024-05-28

## 2024-05-21 NOTE — TELEPHONE ENCOUNTER
Patient thinks she has a sinus infection.  Asked about an appointment but wasn't sure if we could call a script. Pharmacy [facility]  Amoxil does work well

## 2024-05-21 NOTE — TELEPHONE ENCOUNTER
Pt called in stating allergies are super bad wanted to know if you could call in a medication or if you could recommend something over the corner that want cause drowsiness. Good call back number [phone]

## 2024-05-22 NOTE — PROGRESS NOTES
See Allergy Shot Immunotherapy Record Book  Allergy checklist done, no concerns                  Subjective   Patient ID:   [id number]   Jenn Antunez is a 69 y.o. female who presents for No chief complaint on file..    No chief complaint on file.         HPI  This patient is here to evaluate for:      Review of Systems      Objective     There were no vitals taken for this visit.     Physical Exam       Current Outpatient Medications   Medication Sig Dispense Refill    acetaminophen (Tylenol) 325 mg tablet Take by mouth every 4 hours if needed.      albuterol 108 (90 Base) MCG/ACT inhaler Inhale 2 puffs every 6 hours if needed for wheezing.      amoxicillin (Amoxil) 500 mg capsule Take 1 capsule (500 mg) by mouth every 12 hours for 7 days. 14 capsule 0    ascorbic acid (Vitamin C) 500 mg tablet Take 1 tablet (500 mg) by mouth once daily.      cholecalciferol (Vitamin D-3) 25 MCG (1000 UT) capsule Take 1 capsule (25 mcg) by mouth once daily.      EPINEPHrine 0.3 mg/0.3 mL injection syringe USE AS DIRECTED IN CASE OF A SEVERE ALLERGIC REACTION      fluticasone (Flonase) 50 mcg/actuation nasal spray Administer 2 sprays into each nostril once daily. Shake gently. Before first use, prime pump. After use, clean tip and replace cap.      fluticasone propion-salmeteroL (Advair HFA) 115-21 mcg/actuation inhaler INHALE 2 PUFFS BY MOUTH EVERY 12 HOURS IN THE MORNING AND IN THE EVENING 12 g 11    fluticasone propion-salmeteroL (Advair HFA) 45-21 mcg/actuation inhaler Inhale 2 puffs 2 times a day. 12 g 11    ibuprofen 200 mg tablet Take 1 tablet (200 mg) by mouth every 6 hours.      magnesium oxide-Mg AA chelate (Magnesium, oxide/AA chelate,) 300 mg capsule Take 1 capsule (300 mg) by mouth once daily.      NON FORMULARY Take 1 each by mouth once daily. Dwaine      pantoprazole (ProtoNix) 40 mg EC tablet TAKE 1 TABLET BY MOUTH EVERY DAY 90 tablet 1    POTASSIUM ORAL Take 1 tablet by mouth once daily.      tetrahydrozoline  HCl/zinc sulf (ALLERGY RELIEF EYE DROPS OPHT) Administer 1 drop into both eyes once daily as needed.      tretinoin (Retin-A) 0.05 % cream APPLY PEA SIZED AMOUNT TO FACE EVERY NIGHT      urea 40 % lotion APPLY TO AFFECTED AREAS EVERY DAY       No current facility-administered medications for this visit.       Summary of the labs over the past 6 months:    Lab on 03/06/2024   Component Date Value Ref Range Status    Glucose 03/06/2024 103 (H)  74 - 99 mg/dL Final    Sodium 03/06/2024 142  136 - 145 mmol/L Final    Potassium 03/06/2024 4.2  3.5 - 5.3 mmol/L Final    Chloride 03/06/2024 104  98 - 107 mmol/L Final    Bicarbonate 03/06/2024 30  21 - 32 mmol/L Final    Anion Gap 03/06/2024 12  10 - 20 mmol/L Final    Urea Nitrogen 03/06/2024 10  6 - 23 mg/dL Final    Creatinine 03/06/2024 0.63  0.50 - 1.05 mg/dL Final    eGFR 03/06/2024 >90  >60 mL/min/1.73m*2 Final    Calcium 03/06/2024 9.3  8.6 - 10.3 mg/dL Final    Albumin 03/06/2024 4.1  3.4 - 5.0 g/dL Final    Alkaline Phosphatase 03/06/2024 68  33 - 136 U/L Final    Total Protein 03/06/2024 6.2 (L)  6.4 - 8.2 g/dL Final    AST 03/06/2024 41 (H)  9 - 39 U/L Final    Bilirubin, Total 03/06/2024 0.6  0.0 - 1.2 mg/dL Final    ALT 03/06/2024 60 (H)  7 - 45 U/L Final    Creatine Kinase 03/06/2024 82  0 - 215 U/L Final    Magnesium 03/06/2024 1.96  1.60 - 2.40 mg/dL Final   Lab Requisition on 02/27/2024   Component Date Value Ref Range Status    Case Report 02/27/2024    Final                    Value:Surgical Pathology                                Case: [id number]                                  Authorizing Provider:  Ankit Marvin MD     Collected:           02/27/2024 1400              Ordering Location:     [facility]       Received:            02/28/2024 [address] [place] [address]                                                                       Pathologist:           Julieta Rodriguez MD                                                           Specimen:    EYELID RIGHT, RIGHT LOWER LID                                                              FINAL DIAGNOSIS 02/27/2024    Final                    Value:This result contains rich text formatting which cannot be displayed here.      02/27/2024    Final                    Value:This result contains rich text formatting which cannot be displayed here.    Clinical History 02/27/2024    Final                    Value:This result contains rich text formatting which cannot be displayed here.    Gross Description 02/27/2024    Final                    Value:This result contains rich text formatting which cannot be displayed here.   Lab Requisition on 01/31/2024   Component Date Value Ref Range Status    Case Report 01/31/2024    Final                    Value:Surgical Pathology                                Case: [id number]                                  Authorizing Provider:  Cherise Ramirez MD            Collected:           01/31/2024 1035              Ordering Location:     [facility]       Received:            02/01/2024 0618                                     Center                                                                       Pathologist:           Francis Grande MD                                                       Specimen:    ESOPHAGUS MID BIOPSY, esophagus, middle third, cold forcep, biopsy                         FINAL DIAGNOSIS 01/31/2024    Final                    Value:This result contains rich text formatting which cannot be displayed here.      01/31/2024    Final                    Value:This result contains rich text formatting which cannot be displayed here.    Clinical History 01/31/2024    Final                    Value:This result contains rich text formatting which cannot be displayed here.    Gross Description 01/31/2024    Final                    Value:This result contains rich text formatting which cannot be displayed here.   Lab on  10/18/2023   Component Date Value Ref Range Status    WBC 10/18/2023 6.5  4.4 - 11.3 x10*3/uL Final    nRBC 10/18/2023 0.0  0.0 - 0.0 /100 WBCs Final    RBC 10/18/2023 4.17  4.00 - 5.20 x10*6/uL Final    Hemoglobin 10/18/2023 12.6  12.0 - 16.0 g/dL Final    Hematocrit 10/18/2023 39.1  36.0 - 46.0 % Final    MCV 10/18/2023 94  80 - 100 fL Final    MCH 10/18/2023 30.2  26.0 - 34.0 pg Final    MCHC 10/18/2023 32.2  32.0 - 36.0 g/dL Final    RDW 10/18/2023 14.0  11.5 - 14.5 % Final    Platelets 10/18/2023 279  150 - 450 x10*3/uL Final    MPV 10/18/2023 11.3  7.5 - 11.5 fL Final    Thyroid Stimulating Hormone 10/18/2023 1.73  0.44 - 3.98 mIU/L Final    Vitamin D, 25-Hydroxy, Total 10/18/2023 47  30 - 100 ng/mL Final    Hemoglobin A1C 10/18/2023 5.7 (H)  see below % Final    Estimated Average Glucose 10/18/2023 117  Not Established mg/dL Final    Cholesterol 10/18/2023 181  0 - 199 mg/dL Final    HDL-Cholesterol 10/18/2023 45.1  mg/dL Final    Cholesterol/HDL Ratio 10/18/2023 4.0   Final    LDL Calculated 10/18/2023 108 (H)  <=99 mg/dL Final    VLDL 10/18/2023 28  0 - 40 mg/dL Final    Triglycerides 10/18/2023 139  0 - 149 mg/dL Final    Non HDL Cholesterol 10/18/2023 136  0 - 149 mg/dL Final         Assessment/Plan           Francisco J Gates MD

## 2024-05-24 ENCOUNTER — APPOINTMENT (OUTPATIENT)
Dept: ALLERGY | Facility: CLINIC | Age: 69
End: 2024-05-24
Payer: MEDICARE

## 2024-05-27 NOTE — PROGRESS NOTES
See Allergy Shot Immunotherapy Record Book  Allergy checklist done, no concerns                  Subjective   Patient ID:   [id number]   Jenn Antunez is a 69 y.o. female who presents for No chief complaint on file..    No chief complaint on file.         HPI  This patient is here to evaluate for:      Review of Systems      Objective     There were no vitals taken for this visit.     Physical Exam       Current Outpatient Medications   Medication Sig Dispense Refill    acetaminophen (Tylenol) 325 mg tablet Take by mouth every 4 hours if needed.      albuterol 108 (90 Base) MCG/ACT inhaler Inhale 2 puffs every 6 hours if needed for wheezing.      amoxicillin (Amoxil) 500 mg capsule Take 1 capsule (500 mg) by mouth every 12 hours for 7 days. 14 capsule 0    ascorbic acid (Vitamin C) 500 mg tablet Take 1 tablet (500 mg) by mouth once daily.      cholecalciferol (Vitamin D-3) 25 MCG (1000 UT) capsule Take 1 capsule (25 mcg) by mouth once daily.      EPINEPHrine 0.3 mg/0.3 mL injection syringe USE AS DIRECTED IN CASE OF A SEVERE ALLERGIC REACTION      fluticasone (Flonase) 50 mcg/actuation nasal spray Administer 2 sprays into each nostril once daily. Shake gently. Before first use, prime pump. After use, clean tip and replace cap.      fluticasone propion-salmeteroL (Advair HFA) 115-21 mcg/actuation inhaler INHALE 2 PUFFS BY MOUTH EVERY 12 HOURS IN THE MORNING AND IN THE EVENING 12 g 11    fluticasone propion-salmeteroL (Advair HFA) 45-21 mcg/actuation inhaler Inhale 2 puffs 2 times a day. 12 g 11    ibuprofen 200 mg tablet Take 1 tablet (200 mg) by mouth every 6 hours.      magnesium oxide-Mg AA chelate (Magnesium, oxide/AA chelate,) 300 mg capsule Take 1 capsule (300 mg) by mouth once daily.      NON FORMULARY Take 1 each by mouth once daily. Dwaine      pantoprazole (ProtoNix) 40 mg EC tablet TAKE 1 TABLET BY MOUTH EVERY DAY 90 tablet 1    POTASSIUM ORAL Take 1 tablet by mouth once daily.      tetrahydrozoline  HCl/zinc sulf (ALLERGY RELIEF EYE DROPS OPHT) Administer 1 drop into both eyes once daily as needed.      tretinoin (Retin-A) 0.05 % cream APPLY PEA SIZED AMOUNT TO FACE EVERY NIGHT      urea 40 % lotion APPLY TO AFFECTED AREAS EVERY DAY       No current facility-administered medications for this visit.       Summary of the labs over the past 6 months:    Lab on 03/06/2024   Component Date Value Ref Range Status    Glucose 03/06/2024 103 (H)  74 - 99 mg/dL Final    Sodium 03/06/2024 142  136 - 145 mmol/L Final    Potassium 03/06/2024 4.2  3.5 - 5.3 mmol/L Final    Chloride 03/06/2024 104  98 - 107 mmol/L Final    Bicarbonate 03/06/2024 30  21 - 32 mmol/L Final    Anion Gap 03/06/2024 12  10 - 20 mmol/L Final    Urea Nitrogen 03/06/2024 10  6 - 23 mg/dL Final    Creatinine 03/06/2024 0.63  0.50 - 1.05 mg/dL Final    eGFR 03/06/2024 >90  >60 mL/min/1.73m*2 Final    Calcium 03/06/2024 9.3  8.6 - 10.3 mg/dL Final    Albumin 03/06/2024 4.1  3.4 - 5.0 g/dL Final    Alkaline Phosphatase 03/06/2024 68  33 - 136 U/L Final    Total Protein 03/06/2024 6.2 (L)  6.4 - 8.2 g/dL Final    AST 03/06/2024 41 (H)  9 - 39 U/L Final    Bilirubin, Total 03/06/2024 0.6  0.0 - 1.2 mg/dL Final    ALT 03/06/2024 60 (H)  7 - 45 U/L Final    Creatine Kinase 03/06/2024 82  0 - 215 U/L Final    Magnesium 03/06/2024 1.96  1.60 - 2.40 mg/dL Final   Lab Requisition on 02/27/2024   Component Date Value Ref Range Status    Case Report 02/27/2024    Final                    Value:Surgical Pathology                                Case: [id number]                                  Authorizing Provider:  Ankit Marvin MD     Collected:           02/27/2024 1400              Ordering Location:     [facility]       Received:            02/28/2024 [address] [place] [address]                                                                       Pathologist:           Julieta Rodriguez MD                                                           Specimen:    EYELID RIGHT, RIGHT LOWER LID                                                              FINAL DIAGNOSIS 02/27/2024    Final                    Value:This result contains rich text formatting which cannot be displayed here.      02/27/2024    Final                    Value:This result contains rich text formatting which cannot be displayed here.    Clinical History 02/27/2024    Final                    Value:This result contains rich text formatting which cannot be displayed here.    Gross Description 02/27/2024    Final                    Value:This result contains rich text formatting which cannot be displayed here.   Lab Requisition on 01/31/2024   Component Date Value Ref Range Status    Case Report 01/31/2024    Final                    Value:Surgical Pathology                                Case: [id number]                                  Authorizing Provider:  Cherise Ramirez MD            Collected:           01/31/2024 1035              Ordering Location:     [facility]       Received:            02/01/2024 0618                                     Center                                                                       Pathologist:           Francis Grande MD                                                       Specimen:    ESOPHAGUS MID BIOPSY, esophagus, middle third, cold forcep, biopsy                         FINAL DIAGNOSIS 01/31/2024    Final                    Value:This result contains rich text formatting which cannot be displayed here.      01/31/2024    Final                    Value:This result contains rich text formatting which cannot be displayed here.    Clinical History 01/31/2024    Final                    Value:This result contains rich text formatting which cannot be displayed here.    Gross Description 01/31/2024    Final                    Value:This result contains rich text formatting which cannot be displayed here.          Assessment/Plan           Francisco J Gates MD

## 2024-05-28 ENCOUNTER — APPOINTMENT (OUTPATIENT)
Dept: ALLERGY | Facility: CLINIC | Age: 69
End: 2024-05-28
Payer: MEDICARE

## 2024-05-28 NOTE — PROGRESS NOTES
See Allergy Shot Immunotherapy Record Book  Allergy checklist done, no concerns                  Subjective   Patient ID:   [id number]   Jenn Antunez is a 69 y.o. female who presents for No chief complaint on file..    No chief complaint on file.         HPI  This patient is here to evaluate for:      Review of Systems      Objective     There were no vitals taken for this visit.     Physical Exam       Current Outpatient Medications   Medication Sig Dispense Refill    acetaminophen (Tylenol) 325 mg tablet Take by mouth every 4 hours if needed.      albuterol 108 (90 Base) MCG/ACT inhaler Inhale 2 puffs every 6 hours if needed for wheezing.      amoxicillin (Amoxil) 500 mg capsule Take 1 capsule (500 mg) by mouth every 12 hours for 7 days. 14 capsule 0    ascorbic acid (Vitamin C) 500 mg tablet Take 1 tablet (500 mg) by mouth once daily.      cholecalciferol (Vitamin D-3) 25 MCG (1000 UT) capsule Take 1 capsule (25 mcg) by mouth once daily.      EPINEPHrine 0.3 mg/0.3 mL injection syringe USE AS DIRECTED IN CASE OF A SEVERE ALLERGIC REACTION      fluticasone (Flonase) 50 mcg/actuation nasal spray Administer 2 sprays into each nostril once daily. Shake gently. Before first use, prime pump. After use, clean tip and replace cap.      fluticasone propion-salmeteroL (Advair HFA) 115-21 mcg/actuation inhaler INHALE 2 PUFFS BY MOUTH EVERY 12 HOURS IN THE MORNING AND IN THE EVENING 12 g 11    fluticasone propion-salmeteroL (Advair HFA) 45-21 mcg/actuation inhaler Inhale 2 puffs 2 times a day. 12 g 11    ibuprofen 200 mg tablet Take 1 tablet (200 mg) by mouth every 6 hours.      magnesium oxide-Mg AA chelate (Magnesium, oxide/AA chelate,) 300 mg capsule Take 1 capsule (300 mg) by mouth once daily.      NON FORMULARY Take 1 each by mouth once daily. Dwaine      pantoprazole (ProtoNix) 40 mg EC tablet TAKE 1 TABLET BY MOUTH EVERY DAY 90 tablet 1    POTASSIUM ORAL Take 1 tablet by mouth once daily.      tetrahydrozoline  HCl/zinc sulf (ALLERGY RELIEF EYE DROPS OPHT) Administer 1 drop into both eyes once daily as needed.      tretinoin (Retin-A) 0.05 % cream APPLY PEA SIZED AMOUNT TO FACE EVERY NIGHT      urea 40 % lotion APPLY TO AFFECTED AREAS EVERY DAY       No current facility-administered medications for this visit.       Summary of the labs over the past 6 months:    Hospital Outpatient Visit on 04/26/2024   Component Date Value Ref Range Status    AV pk margaret 04/26/2024 1.21  m/s Final    AV mn grad 04/26/2024 3.0  mmHg Final    LV Biplane EF 04/26/2024 59  % Final    MV avg E/e' ratio 04/26/2024 20.00   Final    MV E/A ratio 04/26/2024 0.88   Final    LA vol index A/L 04/26/2024 19.8  ml/m2 Final    Tricuspid annular plane systolic e* 04/26/2024 1.8  cm Final    RV free wall pk S' 04/26/2024 11.10  cm/s Final    LVIDd 04/26/2024 5.02  cm Final    RVSP 04/26/2024 24.3  mmHg Final    AV pk grad 04/26/2024 5.9  mmHg Final    LV A4C EF 04/26/2024 55.0   Final   Lab on 03/06/2024   Component Date Value Ref Range Status    Glucose 03/06/2024 103 (H)  74 - 99 mg/dL Final    Sodium 03/06/2024 142  136 - 145 mmol/L Final    Potassium 03/06/2024 4.2  3.5 - 5.3 mmol/L Final    Chloride 03/06/2024 104  98 - 107 mmol/L Final    Bicarbonate 03/06/2024 30  21 - 32 mmol/L Final    Anion Gap 03/06/2024 12  10 - 20 mmol/L Final    Urea Nitrogen 03/06/2024 10  6 - 23 mg/dL Final    Creatinine 03/06/2024 0.63  0.50 - 1.05 mg/dL Final    eGFR 03/06/2024 >90  >60 mL/min/1.73m*2 Final    Calcium 03/06/2024 9.3  8.6 - 10.3 mg/dL Final    Albumin 03/06/2024 4.1  3.4 - 5.0 g/dL Final    Alkaline Phosphatase 03/06/2024 68  33 - 136 U/L Final    Total Protein 03/06/2024 6.2 (L)  6.4 - 8.2 g/dL Final    AST 03/06/2024 41 (H)  9 - 39 U/L Final    Bilirubin, Total 03/06/2024 0.6  0.0 - 1.2 mg/dL Final    ALT 03/06/2024 60 (H)  7 - 45 U/L Final    Creatine Kinase 03/06/2024 82  0 - 215 U/L Final    Magnesium 03/06/2024 1.96  1.60 - 2.40 mg/dL Final   Lab  Requisition on 02/27/2024   Component Date Value Ref Range Status    Case Report 02/27/2024    Final                    Value:Surgical Pathology                                Case: [id number]                                  Authorizing Provider:  Ankit Marvin MD     Collected:           02/27/2024 1400              Ordering Location:     [facility]       Received:            02/28/2024 1257                                     Center                                                                       Pathologist:           Julieta Rodriguez MD                                                          Specimen:    EYELID RIGHT, RIGHT LOWER LID                                                              FINAL DIAGNOSIS 02/27/2024    Final                    Value:This result contains rich text formatting which cannot be displayed here.      02/27/2024    Final                    Value:This result contains rich text formatting which cannot be displayed here.    Clinical History 02/27/2024    Final                    Value:This result contains rich text formatting which cannot be displayed here.    Gross Description 02/27/2024    Final                    Value:This result contains rich text formatting which cannot be displayed here.   Lab Requisition on 01/31/2024   Component Date Value Ref Range Status    Case Report 01/31/2024    Final                    Value:Surgical Pathology                                Case: [id number]                                  Authorizing Provider:  Cherise Ramirez MD            Collected:           01/31/2024 1035              Ordering Location:     [facility]       Received:            02/01/2024 0618                                     Center                                                                       Pathologist:           Francis Grande MD                                                       Specimen:    ESOPHAGUS MID BIOPSY, esophagus,  middle third, cold forcep, biopsy                         FINAL DIAGNOSIS 01/31/2024    Final                    Value:This result contains rich text formatting which cannot be displayed here.      01/31/2024    Final                    Value:This result contains rich text formatting which cannot be displayed here.    Clinical History 01/31/2024    Final                    Value:This result contains rich text formatting which cannot be displayed here.    Gross Description 01/31/2024    Final                    Value:This result contains rich text formatting which cannot be displayed here.         Assessment/Plan           Francisco J Gates MD

## 2024-05-31 ENCOUNTER — APPOINTMENT (OUTPATIENT)
Dept: CARDIOLOGY | Facility: HOSPITAL | Age: 69
End: 2024-05-31
Payer: MEDICARE

## 2024-05-31 ENCOUNTER — HOSPITAL ENCOUNTER (OUTPATIENT)
Dept: RADIOLOGY | Facility: HOSPITAL | Age: 69
Discharge: HOME | End: 2024-05-31
Payer: MEDICARE

## 2024-05-31 ENCOUNTER — APPOINTMENT (OUTPATIENT)
Dept: ALLERGY | Facility: CLINIC | Age: 69
End: 2024-05-31
Payer: MEDICARE

## 2024-05-31 DIAGNOSIS — E07.89 THYROID MASS OF UNCLEAR ETIOLOGY: ICD-10-CM

## 2024-05-31 LAB
CREAT SERPL-MCNC: 0.61 MG/DL (ref 0.6–1.3)
GFR SERPL CREATININE-BSD FRML MDRD: >90 ML/MIN/1.73M*2

## 2024-05-31 PROCEDURE — 70491 CT SOFT TISSUE NECK W/DYE: CPT

## 2024-05-31 PROCEDURE — 82565 ASSAY OF CREATININE: CPT

## 2024-05-31 PROCEDURE — 70491 CT SOFT TISSUE NECK W/DYE: CPT | Performed by: RADIOLOGY

## 2024-05-31 PROCEDURE — 2550000001 HC RX 255 CONTRASTS: Performed by: INTERNAL MEDICINE

## 2024-05-31 RX ADMIN — IOHEXOL 90 ML: 350 INJECTION, SOLUTION INTRAVENOUS at 13:46

## 2024-06-03 NOTE — PROGRESS NOTES
Primary Care Physician: Devin Novak DO   Date of Visit: 05/03/2024  8:20 AM EDT  Type of Visit: New patient visit    Chief Complaint:  Numerous, see below    HPI  Jenn Antunez 69 y.o. female who presents to follow-up.    Overall doing well.  Reviewed the findings of her testing.    Review of Systems   12 point review of systems was obtained in detail and is negative other than that noted above or below.     Past Medical/Surgical History:  Jenn has a past medical history of Personal history of other diseases of the digestive system (09/20/2013).    Jenn has a past surgical history that includes Other surgical history (11/11/2022) and Other surgical history (11/11/2022).    Social/Family History:  Jenn reports that she has never smoked. She has never used smokeless tobacco. She reports that she does not currently use alcohol. She reports that she does not use drugs.    Jenn's family history includes Lung cancer in her father; m aunt breast cancer hx in an other family member.    Allergies:  Allergies   Allergen Reactions    Azithromycin Unknown    Erythromycin Other    Nitrofurantoin Macrocrystal Other    Nitrofurantoin Macrocrystalline Unknown     Stomach upset    Nitrofurantoin Monohyd/M-Cryst Unknown    Sulfa (Sulfonamide Antibiotics) Swelling    Sulfur Swelling       Medications:  Current Outpatient Medications on File Prior to Visit   Medication Sig    acetaminophen (Tylenol) 325 mg tablet Take by mouth every 4 hours if needed.    albuterol 108 (90 Base) MCG/ACT inhaler Inhale 2 puffs every 6 hours if needed for wheezing.    ascorbic acid (Vitamin C) 500 mg tablet Take 1 tablet (500 mg) by mouth once daily.    cholecalciferol (Vitamin D-3) 25 MCG (1000 UT) capsule Take 1 capsule (25 mcg) by mouth once daily.    EPINEPHrine 0.3 mg/0.3 mL injection syringe USE AS DIRECTED IN CASE OF A SEVERE ALLERGIC REACTION    fluticasone (Flonase) 50 mcg/actuation nasal spray Administer 2 sprays into each nostril  once daily. Shake gently. Before first use, prime pump. After use, clean tip and replace cap.    fluticasone propion-salmeteroL (Advair HFA) 115-21 mcg/actuation inhaler INHALE 2 PUFFS BY MOUTH EVERY 12 HOURS IN THE MORNING AND IN THE EVENING    fluticasone propion-salmeteroL (Advair HFA) 45-21 mcg/actuation inhaler Inhale 2 puffs 2 times a day.    ibuprofen 200 mg tablet Take 1 tablet (200 mg) by mouth every 6 hours.    magnesium oxide-Mg AA chelate (Magnesium, oxide/AA chelate,) 300 mg capsule Take 1 capsule (300 mg) by mouth once daily.    NON FORMULARY Take 1 each by mouth once daily. Dwaine    pantoprazole (ProtoNix) 40 mg EC tablet TAKE 1 TABLET BY MOUTH EVERY DAY    POTASSIUM ORAL Take 1 tablet by mouth once daily.    tetrahydrozoline HCl/zinc sulf (ALLERGY RELIEF EYE DROPS OPHT) Administer 1 drop into both eyes once daily as needed.    tretinoin (Retin-A) 0.05 % cream APPLY PEA SIZED AMOUNT TO FACE EVERY NIGHT    urea 40 % lotion APPLY TO AFFECTED AREAS EVERY DAY     No current facility-administered medications on file prior to visit.       Objective:   Vitals:    05/03/24 0837   BP: 128/82   Pulse: 76   SpO2: 96%       S1-S2 normal, regular rate and rhythm, no murmurs, rubs, or gallops, no carotid bruit  Clear to auscultation bilaterally    Labs and Imaging:      Latest Ref Rng & Units 7/9/2022     6:45 AM 7/10/2022    11:39 PM 9/30/2022     8:10 AM 1/31/2023     4:25 PM 3/21/2023     6:00 AM 5/22/2023     8:11 AM 3/6/2024    11:37 AM   Electrolytes   Na 136 - 145 mmol/L 139  141  142  140  139  140  142    K 3.5 - 5.3 mmol/L 4.0  4.1  4.9  4.4  4.2  4.2  4.2    Cl 98 - 107 mmol/L 105  103  105  101  102  104  104    CO2 21 - 32 mmol/L 24  28  24  30  23  28  30    Cr 0.50 - 1.05 mg/dL 0.60  0.76  0.6  0.82  0.6  0.56  0.63    Ca 8.6 - 10.3 mg/dL 9.1  9.7  9.0  9.7  9.5  9.3  9.3    Phos 2.5 - 4.9 mg/dL 4.0                Latest Ref Rng & Units 7/8/2022     8:19 AM 7/9/2022     6:45 AM 7/10/2022    11:39  PM 9/30/2022     8:10 AM 5/13/2023    11:10 AM 5/22/2023     8:11 AM 10/18/2023     8:40 AM   CBC   Hb 12.0 - 16.0 g/dL 13.3  12.1  12.3  13.0  13.1  12.6  12.6    Hct 36.0 - 46.0 % 39.8  36.3  37.0  40.0  39.6  40.1  39.1    MCV 80 - 100 fL 90  90  90  92.8  93.2  95  94    RDW 11.5 - 14.5 % 14.0  13.8  14.0    14.6  14.0          Latest Ref Rng & Units 7/8/2022     8:19 AM 7/9/2022     6:45 AM 7/10/2022    11:39 PM 3/21/2023     6:00 AM 5/22/2023     8:11 AM 10/18/2023     8:40 AM 3/6/2024    11:37 AM   Lipids   Chol 0 - 199 mg/dL    329   181     HDL mg/dL    64   45.1     LDL <=99 mg/dL    248   108     Trig 0 - 149 mg/dL    86   139     ALT 7 - 45 U/L 60  51  46  26  22   60    AST 9 - 39 U/L 37  30  27  26  18   41          Latest Ref Rng & Units 6/16/2018    11:00 AM 3/11/2020     7:40 PM 7/29/2021     8:47 AM 4/5/2022     4:58 PM 5/13/2023    11:10 AM 10/18/2023     8:40 AM   Thyroid   TSH 0.44 - 3.98 mIU/L 1.76  2.96  1.98  1.06  1.57  1.73           No data to display                 Lab Results   Component Value Date    HGBA1C 5.7 (H) 10/18/2023        The 10-year ASCVD risk score (Nuria DK, et al., 2019) is: 9.6%    Values used to calculate the score:      Age: 69 years      Sex: Female      Is Non- : No      Diabetic: No      Tobacco smoker: No      Systolic Blood Pressure: 128 mmHg      Is BP treated: No      HDL Cholesterol: 45.1 mg/dL      Total Cholesterol: 181 mg/dL     Echocardiogram: No results found for this or any previous visit.     Echocardiogram:   PHYSICIAN INTERPRETATION:  Left Ventricle: The left ventricular systolic function is normal, with an estimated ejection fraction of 65-70%. There are no regional wall motion abnormalities. The left ventricular cavity size is normal. There is mild left ventricular hypertrophy involving the basal wall and septal wall. Spectral Doppler shows an impaired relaxation pattern of left ventricular diastolic filling.  Left Atrium:  The left atrium is upper limits of normal in size.  Right Ventricle: The right ventricle is normal in size. There is normal right ventricular global systolic function.  Right Atrium: The right atrium is normal in size.  Aortic Valve: The aortic valve appears structurally normal. There is no evidence of aortic valve regurgitation. The peak instantaneous gradient of the aortic valve is 12.1 mmHg. The mean gradient of the aortic valve is 7.0 mmHg.  Mitral Valve: The mitral valve is normal in structure. There is trace to mild mitral valve regurgitation.  Tricuspid Valve: The tricuspid valve is structurally normal. There is mild tricuspid regurgitation.  Pulmonic Valve: The pulmonic valve is structurally normal. There is physiologic pulmonic valve regurgitation.  Pericardium: There is a trivial pericardial effusion posterior to the left ventricle.  Aorta: The aortic root is normal.  Pulmonary Artery: The tricuspid regurgitant velocity is 2.68 m/s, and with an estimated right atrial pressure of 3 mmHg, the estimated pulmonary artery pressure is normal with the RVSP at 28.4 mmHg.  Pulmonary Veins: The pulmonary veins appear normal and return normally to the left atrium.  Systemic Veins: The inferior vena cava appears to be of normal size.      CONCLUSIONS:  1. The left ventricular systolic function is normal with a 65-70% estimated ejection fraction.  2. Spectral Doppler shows an impaired relaxation pattern of left ventricular diastolic filling.      Stress Testing: No results found for this or any previous visit from the past 1825 days.    Cardiac Catheterization: No results found for this or any previous visit from the past 1825 days.    Cardiac Scoring: No results found for this or any previous visit from the past 1825 days.    AAA : No results found for this or any previous visit from the past 1825 days.    OTHER: No results found for this or any previous visit from the past 1825 days.        Assessment/Plan:   1.  Thyroid mass of unclear etiology  CT soft tissue neck w IV contrast    CANCELED: CT soft tissue neck w and wo IV contrast           Jenn Antunez 69 y.o. female who presents to establish care:    1.  Bilateral lower extremity edema  2.  Asymptomatic carotid artery stenosis  3.  Likely thyroid nodule on carotid duplex    No significant carotid artery stenosis.  Echocardiogram with structurally normal heart.    Asked her to discuss the thyroid nodule with her PCP as well.        ____________________________________________________________  Luis Goodson MD

## 2024-06-04 ENCOUNTER — APPOINTMENT (OUTPATIENT)
Dept: ALLERGY | Facility: CLINIC | Age: 69
End: 2024-06-04
Payer: MEDICARE

## 2024-06-13 DIAGNOSIS — J45.909 ASTHMA WITHOUT STATUS ASTHMATICUS WITHOUT COMPLICATION, UNSPECIFIED ASTHMA SEVERITY, UNSPECIFIED WHETHER PERSISTENT (HHS-HCC): Primary | ICD-10-CM

## 2024-06-17 ENCOUNTER — APPOINTMENT (OUTPATIENT)
Dept: ALLERGY | Facility: CLINIC | Age: 69
End: 2024-06-17
Payer: MEDICARE

## 2024-06-17 DIAGNOSIS — J30.89 ALLERGIC RHINITIS DUE TO OTHER ALLERGIC TRIGGER, UNSPECIFIED SEASONALITY: Primary | ICD-10-CM

## 2024-06-17 PROCEDURE — 95117 IMMUNOTHERAPY INJECTIONS: CPT | Performed by: ALLERGY & IMMUNOLOGY

## 2024-06-17 RX ORDER — ALBUTEROL SULFATE 90 UG/1
2 AEROSOL, METERED RESPIRATORY (INHALATION) EVERY 4 HOURS PRN
Qty: 36 G | Refills: 5 | Status: SHIPPED | OUTPATIENT
Start: 2024-06-17 | End: 2025-06-17

## 2024-06-24 ENCOUNTER — APPOINTMENT (OUTPATIENT)
Dept: ALLERGY | Facility: CLINIC | Age: 69
End: 2024-06-24
Payer: MEDICARE

## 2024-06-24 ENCOUNTER — PATIENT MESSAGE (OUTPATIENT)
Dept: OBSTETRICS AND GYNECOLOGY | Facility: CLINIC | Age: 69
End: 2024-06-24

## 2024-06-24 DIAGNOSIS — N89.8 VAGINAL DRYNESS: Primary | ICD-10-CM

## 2024-06-24 DIAGNOSIS — J30.89 ALLERGIC RHINITIS DUE TO OTHER ALLERGIC TRIGGER, UNSPECIFIED SEASONALITY: Primary | ICD-10-CM

## 2024-06-24 PROCEDURE — 95117 IMMUNOTHERAPY INJECTIONS: CPT | Performed by: ALLERGY & IMMUNOLOGY

## 2024-06-24 RX ORDER — ESTRADIOL 0.1 MG/G
CREAM VAGINAL
Qty: 34 G | Refills: 3 | Status: SHIPPED | OUTPATIENT
Start: 2024-06-24

## 2024-06-25 ENCOUNTER — APPOINTMENT (OUTPATIENT)
Dept: OTOLARYNGOLOGY | Facility: CLINIC | Age: 69
End: 2024-06-25
Payer: MEDICARE

## 2024-06-25 VITALS — TEMPERATURE: 96.4 F | BODY MASS INDEX: 28.88 KG/M2 | HEIGHT: 63 IN | WEIGHT: 163 LBS

## 2024-06-25 DIAGNOSIS — E04.1 THYROID NODULE: Primary | ICD-10-CM

## 2024-06-25 DIAGNOSIS — G47.33 OBSTRUCTIVE SLEEP APNEA: ICD-10-CM

## 2024-06-25 DIAGNOSIS — R40.0 DAYTIME SOMNOLENCE: ICD-10-CM

## 2024-06-25 PROCEDURE — 1036F TOBACCO NON-USER: CPT | Performed by: OTOLARYNGOLOGY

## 2024-06-25 PROCEDURE — 99203 OFFICE O/P NEW LOW 30 MIN: CPT | Performed by: OTOLARYNGOLOGY

## 2024-06-25 PROCEDURE — 1159F MED LIST DOCD IN RCRD: CPT | Performed by: OTOLARYNGOLOGY

## 2024-06-25 NOTE — PROGRESS NOTES
Chief Complaint   Patient presents with    New Patient Visit     N/P   thyroid nodule     HPI:  Jenn Antunez is a 69 y.o. female recently had carotid ultrasound at the [facility] on April 26, 2024.  No need for any carotid intervention but it did show a right-sided thyroid nodule measuring 1.4 cm and a left nodule measuring 1.1 cm.  She did have follow-up CT scan of the neck which is normal.  She had normal TSH in October 2023.  She does have significant fatigue.  She does have sleep apnea but did not tolerate CPAP.  Trying a dental appliance now.    CT SOFT TISSUE NECK W IV CONTRAST;  5/31/2024      INDICATION:  Signs/Symptoms:Thyroid mass.      FINDINGS:  *Decreased size of the previously demonstrated slightly enlarged shawanda  pharyngeal tonsils *the visualized paranasal sinuses nasopharynx and  oropharynx are unremarkable. *The mandible, maxilla and  temporomandibular joints are normal. *The major salivary glands are  normal. *There is no measurable cervical lymphadenopathy.  *The larynx and related cartilages are normal.  *The thyroid gland is normal.  *The thoracic inlet is normal.      IMPRESSION:  * There is no evidence of mass, cyst or adenopathy in the neck.  PMH:  Past Medical History:   Diagnosis Date    Personal history of other diseases of the digestive system 09/20/2013    History of esophageal reflux     Past Surgical History:   Procedure Laterality Date    OTHER SURGICAL HISTORY  11/11/2022    Appendectomy    OTHER SURGICAL HISTORY  11/11/2022    Biceps tendon rupture repair         Medications:     Current Outpatient Medications:     albuterol (Ventolin HFA) 90 mcg/actuation inhaler, Inhale 2 puffs every 4 hours if needed for wheezing or shortness of breath., Disp: 36 g, Rfl: 5    albuterol 108 (90 Base) MCG/ACT inhaler, Inhale 2 puffs every 6 hours if needed for wheezing., Disp: , Rfl:     EPINEPHrine 0.3 mg/0.3 mL injection syringe, USE AS DIRECTED IN CASE OF A SEVERE ALLERGIC REACTION,  Disp: , Rfl:     estradiol (Estrace) 0.01 % (0.1 mg/gram) vaginal cream, Apply a pea size amount to the vaginal opening three nights a week at bedtime., Disp: 34 g, Rfl: 3    fluticasone propion-salmeteroL (Advair HFA) 115-21 mcg/actuation inhaler, INHALE 2 PUFFS BY MOUTH EVERY 12 HOURS IN THE MORNING AND IN THE EVENING, Disp: 12 g, Rfl: 11    ibuprofen 200 mg tablet, Take 1 tablet (200 mg) by mouth every 6 hours., Disp: , Rfl:     pantoprazole (ProtoNix) 40 mg EC tablet, TAKE 1 TABLET BY MOUTH EVERY DAY, Disp: 90 tablet, Rfl: 1    tetrahydrozoline HCl/zinc sulf (ALLERGY RELIEF EYE DROPS OPHT), Administer 1 drop into both eyes once daily as needed., Disp: , Rfl:     tretinoin (Retin-A) 0.05 % cream, APPLY PEA SIZED AMOUNT TO FACE EVERY NIGHT, Disp: , Rfl:     urea 40 % lotion, APPLY TO AFFECTED AREAS EVERY DAY, Disp: , Rfl:     acetaminophen (Tylenol) 325 mg tablet, Take by mouth every 4 hours if needed., Disp: , Rfl:     ascorbic acid (Vitamin C) 500 mg tablet, Take 1 tablet (500 mg) by mouth once daily., Disp: , Rfl:     cholecalciferol (Vitamin D-3) 25 MCG (1000 UT) capsule, Take 1 capsule (25 mcg) by mouth once daily., Disp: , Rfl:     fluticasone (Flonase) 50 mcg/actuation nasal spray, Administer 2 sprays into each nostril once daily. Shake gently. Before first use, prime pump. After use, clean tip and replace cap., Disp: , Rfl:     fluticasone propion-salmeteroL (Advair HFA) 45-21 mcg/actuation inhaler, Inhale 2 puffs 2 times a day., Disp: 12 g, Rfl: 11    magnesium oxide-Mg AA chelate (Magnesium, oxide/AA chelate,) 300 mg capsule, Take 1 capsule (300 mg) by mouth once daily., Disp: , Rfl:     NON FORMULARY, Take 1 each by mouth once daily. Dwaine, Disp: , Rfl:     POTASSIUM ORAL, Take 1 tablet by mouth once daily., Disp: , Rfl:      Allergies:  Allergies   Allergen Reactions    Azithromycin Unknown    Erythromycin Other    Nitrofurantoin Macrocrystal Other    Nitrofurantoin Macrocrystalline Unknown      "Stomach upset    Nitrofurantoin Monohyd/M-Cryst Unknown    Sulfa (Sulfonamide Antibiotics) Swelling    Sulfur Swelling        ROS:  Review of systems normal unless stated otherwise in the HPI and/or PMH.    Physical Exam:  Temperature 35.8 °C (96.4 °F), height 1.6 m (5' 3\"), weight 73.9 kg (163 lb). Body mass index is 28.87 kg/m².     GENERAL APPEARANCE: Well developed and well nourished.  Alert and oriented in no acute distress.  Normal vocal quality.      HEAD/FACE: No erythema or edema or facial tenderness.  Normal facial nerve function bilaterally.    EAR:       EXTERNAL: Normal pinnas and external auditory canals without lesion or obstructing wax.  Wax removed on the right       MIDDLE EAR: Tympanic membranes intact and mobile with normal landmarks.  Middle ear space appears well aerated.       TUBE STATUS: N/A       MASTOID CAVITY: N/A       HEARING: Gross hearing assessment is within normal limits.      NOSE:       VISUALIZED USING: Anterior rhinoscopy with headlight and nasal speculum.       DORSUM: Midline, nontraumatic appearance.       MUCOSA: Normal-appearing.       SECRETIONS: Normal.       SEPTUM: Midline and nonobstructing.       INFERIOR TURBINATES: Normal.       MIDDLE TURBINATES/MEATUS: N/A       BLEEDING: N/A         ORAL CAVITY/PHARYNX:       TEETH: Adequate dentition.       TONGUE: No mass or lesion.  Normal mobility.       FLOOR OF MOUTH: No mass or lesion.       PALATE: Normal hard palate, soft palate, and uvula.       OROPHARYNX: Normal without mass or lesion.       BUCCAL MUCOSA/GBS: Normal without mass or lesion.       LIPS: Normal.    LARYNX/HYPOPHARYNX/NASOPHARYNX: N/A    NECK: No palpable masses or abnormal adenopathy.  Trachea is midline.    THYROID: No thyromegaly or palpable nodule.    SALIVARY GLANDS: Normal bilateral parotid and submandibular glands by inspection and palpation.    TMJ's: Normal.    NEURO: Cranial nerve exam grossly normal bilaterally.       Assessment/Plan   Jenn" was seen today for new patient visit.  Diagnoses and all orders for this visit:  Thyroid nodule (Primary)  -     US thyroid; Future  Obstructive sleep apnea  Daytime somnolence     Check thyroid ultrasound to assess the nodule seen on carotid ultrasound.  This would be much better assessment than CT scan.  Recommend continued significant treatment for her obstructive sleep apnea.  Follow up for test results after testing is complete.     Mukul Gomez MD

## 2024-06-26 ENCOUNTER — OFFICE VISIT (OUTPATIENT)
Dept: OBSTETRICS AND GYNECOLOGY | Facility: CLINIC | Age: 69
End: 2024-06-26
Payer: MEDICARE

## 2024-06-26 VITALS — BODY MASS INDEX: 29.05 KG/M2 | DIASTOLIC BLOOD PRESSURE: 70 MMHG | SYSTOLIC BLOOD PRESSURE: 138 MMHG | WEIGHT: 164 LBS

## 2024-06-26 DIAGNOSIS — B37.2 YEAST DERMATITIS: ICD-10-CM

## 2024-06-26 DIAGNOSIS — N90.89 VULVAR LESION: Primary | ICD-10-CM

## 2024-06-26 PROCEDURE — 87529 HSV DNA AMP PROBE: CPT

## 2024-06-26 PROCEDURE — 1159F MED LIST DOCD IN RCRD: CPT | Performed by: OBSTETRICS & GYNECOLOGY

## 2024-06-26 PROCEDURE — 99213 OFFICE O/P EST LOW 20 MIN: CPT | Performed by: OBSTETRICS & GYNECOLOGY

## 2024-06-26 RX ORDER — ACYCLOVIR 400 MG/1
TABLET ORAL
Qty: 15 TABLET | Refills: 11 | Status: SHIPPED | OUTPATIENT
Start: 2024-06-26 | End: 2025-06-26

## 2024-06-26 RX ORDER — SELENIUM 50 MCG
TABLET ORAL
COMMUNITY

## 2024-06-26 RX ORDER — NYSTATIN 100000 [USP'U]/G
1 POWDER TOPICAL 3 TIMES DAILY
Qty: 15 G | Refills: 0 | Status: SHIPPED | OUTPATIENT
Start: 2024-06-26 | End: 2025-06-26

## 2024-06-26 ASSESSMENT — ENCOUNTER SYMPTOMS
CONSTITUTIONAL NEGATIVE: 1
MUSCULOSKELETAL NEGATIVE: 1
PSYCHIATRIC NEGATIVE: 1
GASTROINTESTINAL NEGATIVE: 1
NEUROLOGICAL NEGATIVE: 1
RESPIRATORY NEGATIVE: 1
CARDIOVASCULAR NEGATIVE: 1

## 2024-06-26 NOTE — PROGRESS NOTES
Subjective   Patient ID: Jenn Antunez is a 69 y.o. female who presents for vaginal cyst.  HPI    Patient presents for complaint of recurrent vulvar lesions. She has a history of genital herpes and commonly gets out breaks in the summer when she starts riding her bike. She has noted the current lesions for the last 3 days. She denies bladder obstruction, itching, odor, dysuria.     Review of Systems   Constitutional: Negative.    Respiratory: Negative.     Cardiovascular: Negative.    Gastrointestinal: Negative.    Genitourinary:  Positive for vaginal pain.   Musculoskeletal: Negative.    Skin: Negative.    Neurological: Negative.    Psychiatric/Behavioral: Negative.         Objective   Visit Vitals  /70   Wt 74.4 kg (164 lb)   BMI 29.05 kg/m²   OB Status Postmenopausal   Smoking Status Never   BSA 1.82 m²       Physical Exam  Constitutional:       Appearance: Normal appearance.   Pulmonary:      Effort: Pulmonary effort is normal.   Genitourinary:     Comments: Clusters of small vulvar ulcers consistent with HSV outbreak  Neurological:      Mental Status: She is alert.   Psychiatric:         Mood and Affect: Mood normal.         Behavior: Behavior normal.         Assessment/Plan   Problem List Items Addressed This Visit    None  Visit Diagnoses         Codes    Vulvar lesion    -  Primary N90.89    Relevant Medications    acyclovir (Zovirax) 400 mg tablet    Other Relevant Orders    HSV PCR, Skin/Mucosa    Yeast dermatitis     B37.2    Relevant Medications    nystatin (Mycostatin) 100,000 unit/gram powder          Discussed findings on exam and clinical course  HSV PCR obtained  Rx for Acyclovir and Nystatin powder sent to pharmacy  Precautions given  Will call with results       Lou Santacruz DO 06/26/24 3:55 PM

## 2024-06-26 NOTE — PATIENT INSTRUCTIONS
Vaginal itching, burning, and/or discharge in the Postmenopausal Patient:  You were seen in office today for vaginal discharge, itching, burning, and odor  A BV and yeast culture was sent, you will be notified of results by phone call/MyChart  Continue your oral probiotic and vaginal estrogen cream as advised.   A HSV culture was sent today and a prescription for Acyclovir sent to your pharmacy  Continue to abstain from soap/douching products in the vagina, and to use loose fitting cotton underwear  You can find products to help with menopausal vaginal symptoms on www.[employer]. Revaree is a vaginal moisturizer and Clairvee can help with vaginal odor.   If symptoms continue please call the office or return for reassessment [phone] ([place]) or [phone] ([place])

## 2024-06-27 ENCOUNTER — HOSPITAL ENCOUNTER (OUTPATIENT)
Dept: RADIOLOGY | Facility: HOSPITAL | Age: 69
Discharge: HOME | End: 2024-06-27
Payer: MEDICARE

## 2024-06-27 DIAGNOSIS — E04.1 THYROID NODULE: ICD-10-CM

## 2024-06-27 LAB
HSV1 DNA SKIN QL NAA+PROBE: NOT DETECTED
HSV2 DNA SKIN QL NAA+PROBE: NOT DETECTED

## 2024-06-27 PROCEDURE — 76536 US EXAM OF HEAD AND NECK: CPT

## 2024-06-27 PROCEDURE — 76536 US EXAM OF HEAD AND NECK: CPT | Performed by: RADIOLOGY

## 2024-06-29 NOTE — PROGRESS NOTES
See Allergy Shot Immunotherapy Record Book  Allergy checklist done, no concerns                  Subjective   Patient ID:   [id number]   Jenn Antunez is a 69 y.o. female who presents for No chief complaint on file..    No chief complaint on file.         HPI  This patient is here to evaluate for:      Review of Systems      Objective     There were no vitals taken for this visit.     Physical Exam       Current Outpatient Medications   Medication Sig Dispense Refill    acetaminophen (Tylenol) 325 mg tablet Take by mouth every 4 hours if needed.      acyclovir (Zovirax) 400 mg tablet Take 1 tablet (400 mg) by mouth three times daily for 5 days. 15 tablet 11    albuterol (Ventolin HFA) 90 mcg/actuation inhaler Inhale 2 puffs every 4 hours if needed for wheezing or shortness of breath. 36 g 5    albuterol 108 (90 Base) MCG/ACT inhaler Inhale 2 puffs every 6 hours if needed for wheezing.      EPINEPHrine 0.3 mg/0.3 mL injection syringe USE AS DIRECTED IN CASE OF A SEVERE ALLERGIC REACTION      estradiol (Estrace) 0.01 % (0.1 mg/gram) vaginal cream Apply a pea size amount to the vaginal opening three nights a week at bedtime. 34 g 3    fluticasone propion-salmeteroL (Advair HFA) 115-21 mcg/actuation inhaler INHALE 2 PUFFS BY MOUTH EVERY 12 HOURS IN THE MORNING AND IN THE EVENING 12 g 11    fluticasone propion-salmeteroL (Advair HFA) 45-21 mcg/actuation inhaler Inhale 2 puffs 2 times a day. 12 g 11    ibuprofen 200 mg tablet Take 1 tablet (200 mg) by mouth every 6 hours.      lactobacillus acidophilus capsule Take by mouth.      nystatin (Mycostatin) 100,000 unit/gram powder Apply 1 Application topically 3 times a day. 15 g 0    pantoprazole (ProtoNix) 40 mg EC tablet TAKE 1 TABLET BY MOUTH EVERY DAY 90 tablet 1    tetrahydrozoline HCl/zinc sulf (ALLERGY RELIEF EYE DROPS OPHT) Administer 1 drop into both eyes once daily as needed.      tretinoin (Retin-A) 0.05 % cream APPLY PEA SIZED AMOUNT TO FACE EVERY NIGHT      urea  40 % lotion APPLY TO AFFECTED AREAS EVERY DAY       No current facility-administered medications for this visit.       Summary of the labs over the past 6 months:    Hospital Outpatient Visit on 05/31/2024   Component Date Value Ref Range Status    POCT Creatinine 05/31/2024 0.61  0.60 - 1.30 mg/dL Final    POCT eGFR 05/31/2024 >90  >=60 mL/min/1.73m*2 Final   Hospital Outpatient Visit on 04/26/2024   Component Date Value Ref Range Status    AV pk margaret 04/26/2024 1.21  m/s Final    AV mn grad 04/26/2024 3.0  mmHg Final    LV Biplane EF 04/26/2024 59  % Final    MV avg E/e' ratio 04/26/2024 20.00   Final    MV E/A ratio 04/26/2024 0.88   Final    LA vol index A/L 04/26/2024 19.8  ml/m2 Final    Tricuspid annular plane systolic e* 04/26/2024 1.8  cm Final    RV free wall pk S' 04/26/2024 11.10  cm/s Final    LVIDd 04/26/2024 5.02  cm Final    RVSP 04/26/2024 24.3  mmHg Final    AV pk grad 04/26/2024 5.9  mmHg Final    LV A4C EF 04/26/2024 55.0   Final   Lab on 03/06/2024   Component Date Value Ref Range Status    Glucose 03/06/2024 103 (H)  74 - 99 mg/dL Final    Sodium 03/06/2024 142  136 - 145 mmol/L Final    Potassium 03/06/2024 4.2  3.5 - 5.3 mmol/L Final    Chloride 03/06/2024 104  98 - 107 mmol/L Final    Bicarbonate 03/06/2024 30  21 - 32 mmol/L Final    Anion Gap 03/06/2024 12  10 - 20 mmol/L Final    Urea Nitrogen 03/06/2024 10  6 - 23 mg/dL Final    Creatinine 03/06/2024 0.63  0.50 - 1.05 mg/dL Final    eGFR 03/06/2024 >90  >60 mL/min/1.73m*2 Final    Calcium 03/06/2024 9.3  8.6 - 10.3 mg/dL Final    Albumin 03/06/2024 4.1  3.4 - 5.0 g/dL Final    Alkaline Phosphatase 03/06/2024 68  33 - 136 U/L Final    Total Protein 03/06/2024 6.2 (L)  6.4 - 8.2 g/dL Final    AST 03/06/2024 41 (H)  9 - 39 U/L Final    Bilirubin, Total 03/06/2024 0.6  0.0 - 1.2 mg/dL Final    ALT 03/06/2024 60 (H)  7 - 45 U/L Final    Creatine Kinase 03/06/2024 82  0 - 215 U/L Final    Magnesium 03/06/2024 1.96  1.60 - 2.40 mg/dL Final   Lab  Requisition on 02/27/2024   Component Date Value Ref Range Status    Case Report 02/27/2024    Final                    Value:Surgical Pathology                                Case: [id number]                                  Authorizing Provider:  Ankit Marvin MD     Collected:           02/27/2024 1400              Ordering Location:     [facility] [place]       Received:            02/28/2024 1257                                     Center                                                                       Pathologist:           Julieta Rodriguez MD                                                          Specimen:    EYELID RIGHT, RIGHT LOWER LID                                                              FINAL DIAGNOSIS 02/27/2024    Final                    Value:This result contains rich text formatting which cannot be displayed here.      02/27/2024    Final                    Value:This result contains rich text formatting which cannot be displayed here.    Clinical History 02/27/2024    Final                    Value:This result contains rich text formatting which cannot be displayed here.    Gross Description 02/27/2024    Final                    Value:This result contains rich text formatting which cannot be displayed here.   Lab Requisition on 01/31/2024   Component Date Value Ref Range Status    Case Report 01/31/2024    Final                    Value:Surgical Pathology                                Case: [id number]                                  Authorizing Provider:  Cherise Ramirez MD            Collected:           01/31/2024 1035              Ordering Location:     [facility] [place]       Received:            02/01/2024 0618                                     Center                                                                       Pathologist:           Francis Grande MD                                                       Specimen:    ESOPHAGUS MID BIOPSY, esophagus,  middle third, cold forcep, biopsy                         FINAL DIAGNOSIS 01/31/2024    Final                    Value:This result contains rich text formatting which cannot be displayed here.      01/31/2024    Final                    Value:This result contains rich text formatting which cannot be displayed here.    Clinical History 01/31/2024    Final                    Value:This result contains rich text formatting which cannot be displayed here.    Gross Description 01/31/2024    Final                    Value:This result contains rich text formatting which cannot be displayed here.         Assessment/Plan           Francisco J Gates MD

## 2024-06-29 NOTE — PROGRESS NOTES
See Allergy Shot Immunotherapy Record Book  Allergy checklist done, no concerns                  Subjective   Patient ID:   [id number]   Jenn Antunez is a 69 y.o. female who presents for No chief complaint on file..    No chief complaint on file.         HPI  This patient is here to evaluate for:      Review of Systems      Objective     There were no vitals taken for this visit.     Physical Exam       Current Outpatient Medications   Medication Sig Dispense Refill    acetaminophen (Tylenol) 325 mg tablet Take by mouth every 4 hours if needed.      acyclovir (Zovirax) 400 mg tablet Take 1 tablet (400 mg) by mouth three times daily for 5 days. 15 tablet 11    albuterol (Ventolin HFA) 90 mcg/actuation inhaler Inhale 2 puffs every 4 hours if needed for wheezing or shortness of breath. 36 g 5    albuterol 108 (90 Base) MCG/ACT inhaler Inhale 2 puffs every 6 hours if needed for wheezing.      EPINEPHrine 0.3 mg/0.3 mL injection syringe USE AS DIRECTED IN CASE OF A SEVERE ALLERGIC REACTION      estradiol (Estrace) 0.01 % (0.1 mg/gram) vaginal cream Apply a pea size amount to the vaginal opening three nights a week at bedtime. 34 g 3    fluticasone propion-salmeteroL (Advair HFA) 115-21 mcg/actuation inhaler INHALE 2 PUFFS BY MOUTH EVERY 12 HOURS IN THE MORNING AND IN THE EVENING 12 g 11    fluticasone propion-salmeteroL (Advair HFA) 45-21 mcg/actuation inhaler Inhale 2 puffs 2 times a day. 12 g 11    ibuprofen 200 mg tablet Take 1 tablet (200 mg) by mouth every 6 hours.      lactobacillus acidophilus capsule Take by mouth.      nystatin (Mycostatin) 100,000 unit/gram powder Apply 1 Application topically 3 times a day. 15 g 0    pantoprazole (ProtoNix) 40 mg EC tablet TAKE 1 TABLET BY MOUTH EVERY DAY 90 tablet 1    tetrahydrozoline HCl/zinc sulf (ALLERGY RELIEF EYE DROPS OPHT) Administer 1 drop into both eyes once daily as needed.      tretinoin (Retin-A) 0.05 % cream APPLY PEA SIZED AMOUNT TO FACE EVERY NIGHT      urea  40 % lotion APPLY TO AFFECTED AREAS EVERY DAY       No current facility-administered medications for this visit.       Summary of the labs over the past 6 months:    Hospital Outpatient Visit on 04/26/2024   Component Date Value Ref Range Status    AV pk margaret 04/26/2024 1.21  m/s Final    AV mn grad 04/26/2024 3.0  mmHg Final    LV Biplane EF 04/26/2024 59  % Final    MV avg E/e' ratio 04/26/2024 20.00   Final    MV E/A ratio 04/26/2024 0.88   Final    LA vol index A/L 04/26/2024 19.8  ml/m2 Final    Tricuspid annular plane systolic e* 04/26/2024 1.8  cm Final    RV free wall pk S' 04/26/2024 11.10  cm/s Final    LVIDd 04/26/2024 5.02  cm Final    RVSP 04/26/2024 24.3  mmHg Final    AV pk grad 04/26/2024 5.9  mmHg Final    LV A4C EF 04/26/2024 55.0   Final   Lab on 03/06/2024   Component Date Value Ref Range Status    Glucose 03/06/2024 103 (H)  74 - 99 mg/dL Final    Sodium 03/06/2024 142  136 - 145 mmol/L Final    Potassium 03/06/2024 4.2  3.5 - 5.3 mmol/L Final    Chloride 03/06/2024 104  98 - 107 mmol/L Final    Bicarbonate 03/06/2024 30  21 - 32 mmol/L Final    Anion Gap 03/06/2024 12  10 - 20 mmol/L Final    Urea Nitrogen 03/06/2024 10  6 - 23 mg/dL Final    Creatinine 03/06/2024 0.63  0.50 - 1.05 mg/dL Final    eGFR 03/06/2024 >90  >60 mL/min/1.73m*2 Final    Calcium 03/06/2024 9.3  8.6 - 10.3 mg/dL Final    Albumin 03/06/2024 4.1  3.4 - 5.0 g/dL Final    Alkaline Phosphatase 03/06/2024 68  33 - 136 U/L Final    Total Protein 03/06/2024 6.2 (L)  6.4 - 8.2 g/dL Final    AST 03/06/2024 41 (H)  9 - 39 U/L Final    Bilirubin, Total 03/06/2024 0.6  0.0 - 1.2 mg/dL Final    ALT 03/06/2024 60 (H)  7 - 45 U/L Final    Creatine Kinase 03/06/2024 82  0 - 215 U/L Final    Magnesium 03/06/2024 1.96  1.60 - 2.40 mg/dL Final   Lab Requisition on 02/27/2024   Component Date Value Ref Range Status    Case Report 02/27/2024    Final                    Value:Surgical Pathology                                Case: [id number]                                   Authorizing Provider:  Ankit Marvin MD     Collected:           02/27/2024 1400              Ordering Location:     [facility]       Received:            02/28/2024 1257                                     Center                                                                       Pathologist:           Julieta Rodriguez MD                                                          Specimen:    EYELID RIGHT, RIGHT LOWER LID                                                              FINAL DIAGNOSIS 02/27/2024    Final                    Value:This result contains rich text formatting which cannot be displayed here.      02/27/2024    Final                    Value:This result contains rich text formatting which cannot be displayed here.    Clinical History 02/27/2024    Final                    Value:This result contains rich text formatting which cannot be displayed here.    Gross Description 02/27/2024    Final                    Value:This result contains rich text formatting which cannot be displayed here.   Lab Requisition on 01/31/2024   Component Date Value Ref Range Status    Case Report 01/31/2024    Final                    Value:Surgical Pathology                                Case: [id number]                                  Authorizing Provider:  Cherise Ramirez MD            Collected:           01/31/2024 1035              Ordering Location:     [facility]       Received:            02/01/2024 0618                                     [place]                                                                       Pathologist:           Francis Grande MD                                                       Specimen:    ESOPHAGUS MID BIOPSY, esophagus, middle third, cold forcep, biopsy                         FINAL DIAGNOSIS 01/31/2024    Final                    Value:This result contains rich text formatting which cannot be displayed here.       01/31/2024    Final                    Value:This result contains rich text formatting which cannot be displayed here.    Clinical History 01/31/2024    Final                    Value:This result contains rich text formatting which cannot be displayed here.    Gross Description 01/31/2024    Final                    Value:This result contains rich text formatting which cannot be displayed here.         Assessment/Plan           Francisco J Gates MD

## 2024-06-29 NOTE — PROGRESS NOTES
[facility] - Consult Note    Consultation request by Ellen Dougherty MD for advice/opinion related to evaluating Acute encephalopathy [G93.40]    Date of  Admission: 12/18/2020 10:06 PM   Primary Care Physician:  UNKNOWN     Assessment:     Patient Active Problem List   Diagnosis Code    Acute encephalopathy G93.40     -NSTEMI- with peak troponin at 2 that is likely demand ischemia secondary to fall and ETOH withdraw.  -Hepatic encephalopathy secondary to ETOH'ism. Still with confusion and not able to provide accurate details. ?concern for DT's?  -Hyperkalemia at 5.8, now improved to 5.2.  -History of multiple falls  -CKD with worsening MARYCHUY in setting of above.    -No known cardiologist.      Plan:     Independently seen and evaluated. Agree with below. Patient has no complaints of shortness of breath or chest pains. His troponin elevation unlikely from acute coronary syndrome. We will check an echocardiogram, which has been requested, to assess overall left ventricular systolic function. Would empirically utilize aspirin, beta-blockers, as tolerated.    -No acute changes to the EKG with a mildly prolonged QT that we will monitor.  -Echo to assess LV function and EF.   -Continue on heparin drip for NSTEMI. -Given patients current status would not pursue cath or stress until his encephalopathy has improved and he is more stable  -Would consider protecting patient with withdraw protocol, and will defer to primary team for management  -More recommendations pending clinical course, test results.  -Will follow. History of Present Illness: This is a 46 y.o. male admitted for Acute encephalopathy [G93.40]. Patient complains of:  Patient transferred from [address] [place] [address] due to elevated troponin's in setting of ETOH use. The patient is not able to provide an accurate history, and some of his story is not consistent and is confusing. No reports of pain or shortness of breath.  Medical records See Allergy Shot Immunotherapy Record Book  Allergy checklist done, no concerns                  Subjective   Patient ID:   [id number]   Jenn Antunez is a 69 y.o. female who presents for No chief complaint on file..    No chief complaint on file.         HPI  This patient is here to evaluate for:      Review of Systems      Objective     There were no vitals taken for this visit.     Physical Exam       Current Outpatient Medications   Medication Sig Dispense Refill    acetaminophen (Tylenol) 325 mg tablet Take by mouth every 4 hours if needed.      acyclovir (Zovirax) 400 mg tablet Take 1 tablet (400 mg) by mouth three times daily for 5 days. 15 tablet 11    albuterol (Ventolin HFA) 90 mcg/actuation inhaler Inhale 2 puffs every 4 hours if needed for wheezing or shortness of breath. 36 g 5    albuterol 108 (90 Base) MCG/ACT inhaler Inhale 2 puffs every 6 hours if needed for wheezing.      EPINEPHrine 0.3 mg/0.3 mL injection syringe USE AS DIRECTED IN CASE OF A SEVERE ALLERGIC REACTION      estradiol (Estrace) 0.01 % (0.1 mg/gram) vaginal cream Apply a pea size amount to the vaginal opening three nights a week at bedtime. 34 g 3    fluticasone propion-salmeteroL (Advair HFA) 115-21 mcg/actuation inhaler INHALE 2 PUFFS BY MOUTH EVERY 12 HOURS IN THE MORNING AND IN THE EVENING 12 g 11    fluticasone propion-salmeteroL (Advair HFA) 45-21 mcg/actuation inhaler Inhale 2 puffs 2 times a day. 12 g 11    ibuprofen 200 mg tablet Take 1 tablet (200 mg) by mouth every 6 hours.      lactobacillus acidophilus capsule Take by mouth.      nystatin (Mycostatin) 100,000 unit/gram powder Apply 1 Application topically 3 times a day. 15 g 0    pantoprazole (ProtoNix) 40 mg EC tablet TAKE 1 TABLET BY MOUTH EVERY DAY 90 tablet 1    tetrahydrozoline HCl/zinc sulf (ALLERGY RELIEF EYE DROPS OPHT) Administer 1 drop into both eyes once daily as needed.      tretinoin (Retin-A) 0.05 % cream APPLY PEA SIZED AMOUNT TO FACE EVERY NIGHT      urea  40 % lotion APPLY TO AFFECTED AREAS EVERY DAY       No current facility-administered medications for this visit.       Summary of the labs over the past 6 months:    Hospital Outpatient Visit on 05/31/2024   Component Date Value Ref Range Status    POCT Creatinine 05/31/2024 0.61  0.60 - 1.30 mg/dL Final    POCT eGFR 05/31/2024 >90  >=60 mL/min/1.73m*2 Final   Hospital Outpatient Visit on 04/26/2024   Component Date Value Ref Range Status    AV pk margaret 04/26/2024 1.21  m/s Final    AV mn grad 04/26/2024 3.0  mmHg Final    LV Biplane EF 04/26/2024 59  % Final    MV avg E/e' ratio 04/26/2024 20.00   Final    MV E/A ratio 04/26/2024 0.88   Final    LA vol index A/L 04/26/2024 19.8  ml/m2 Final    Tricuspid annular plane systolic e* 04/26/2024 1.8  cm Final    RV free wall pk S' 04/26/2024 11.10  cm/s Final    LVIDd 04/26/2024 5.02  cm Final    RVSP 04/26/2024 24.3  mmHg Final    AV pk grad 04/26/2024 5.9  mmHg Final    LV A4C EF 04/26/2024 55.0   Final   Lab on 03/06/2024   Component Date Value Ref Range Status    Glucose 03/06/2024 103 (H)  74 - 99 mg/dL Final    Sodium 03/06/2024 142  136 - 145 mmol/L Final    Potassium 03/06/2024 4.2  3.5 - 5.3 mmol/L Final    Chloride 03/06/2024 104  98 - 107 mmol/L Final    Bicarbonate 03/06/2024 30  21 - 32 mmol/L Final    Anion Gap 03/06/2024 12  10 - 20 mmol/L Final    Urea Nitrogen 03/06/2024 10  6 - 23 mg/dL Final    Creatinine 03/06/2024 0.63  0.50 - 1.05 mg/dL Final    eGFR 03/06/2024 >90  >60 mL/min/1.73m*2 Final    Calcium 03/06/2024 9.3  8.6 - 10.3 mg/dL Final    Albumin 03/06/2024 4.1  3.4 - 5.0 g/dL Final    Alkaline Phosphatase 03/06/2024 68  33 - 136 U/L Final    Total Protein 03/06/2024 6.2 (L)  6.4 - 8.2 g/dL Final    AST 03/06/2024 41 (H)  9 - 39 U/L Final    Bilirubin, Total 03/06/2024 0.6  0.0 - 1.2 mg/dL Final    ALT 03/06/2024 60 (H)  7 - 45 U/L Final    Creatine Kinase 03/06/2024 82  0 - 215 U/L Final    Magnesium 03/06/2024 1.96  1.60 - 2.40 mg/dL Final   Lab  from hospitalist:    -admitted from [facility] where here. Apparently patient was admitted to the ER with nonspecific complaint like weakness multiple falls without any particular reason was also found to have CKD which is worsened suggestive of MARYCHUY and hyperkalemia which was persistent in spite of treatment/they decided to transfer the patient to tertiary care bariatric he can be managed closely like a stepdown unit or if needed ICU kind of a setting. And himself is not a good historian he appears with alert very sleepy and obvious confusion is present     Patient has a history of alcoholic cirrhosis of liver, diabetes mellitus type 2. Strongly suspect acute metabolic encephalopathy or hepatic encephalopathy at this point, serial labs are ordered    Cardiac risk factors: diabetes mellitus, male gender, ETOHism      Review of Symptoms:  Except as stated above include:  Constitutional:  negative  Respiratory:  negative  Cardiovascular:  negative  Gastrointestinal: negative  Genitourinary:  negative  Musculoskeletal:  Negative  Neurological:  Negative  Dermatological:  Negative  Endocrinological: Negative  Psychological:  Negative    Review of systems not obtained due to patient factors. Past Medical History:   No past medical history on file. Social History:     Social History     Socioeconomic History    Marital status:      Spouse name: Not on file    Number of children: Not on file    Years of education: Not on file    Highest education level: Not on file        Family History:   No family history on file. Medications:      Allergies   Allergen Reactions    Crab Unable to Obtain    Shellfish Derived Unable to Obtain        Current Facility-Administered Medications   Medication Dose Route Frequency    heparin 25,000 units in D5W 250 ml infusion  11.806-25 Units/kg/hr IntraVENous TITRATE    aspirin tablet 325 mg  325 mg Oral DAILY    atorvastatin (LIPITOR) Requisition on 02/27/2024   Component Date Value Ref Range Status    Case Report 02/27/2024    Final                    Value:Surgical Pathology                                Case: [id number]                                  Authorizing Provider:  Ankit Marvin MD     Collected:           02/27/2024 1400              Ordering Location:     [facility]       Received:            02/28/2024 1257                                     Center                                                                       Pathologist:           Julieta Rodriguez MD                                                          Specimen:    EYELID RIGHT, RIGHT LOWER LID                                                              FINAL DIAGNOSIS 02/27/2024    Final                    Value:This result contains rich text formatting which cannot be displayed here.      02/27/2024    Final                    Value:This result contains rich text formatting which cannot be displayed here.    Clinical History 02/27/2024    Final                    Value:This result contains rich text formatting which cannot be displayed here.    Gross Description 02/27/2024    Final                    Value:This result contains rich text formatting which cannot be displayed here.   Lab Requisition on 01/31/2024   Component Date Value Ref Range Status    Case Report 01/31/2024    Final                    Value:Surgical Pathology                                Case: [id number]                                  Authorizing Provider:  Cherise Ramirez MD            Collected:           01/31/2024 1035              Ordering Location:     [facility]       Received:            02/01/2024 0618                                     Center                                                                       Pathologist:           Francis Grande MD                                                       Specimen:    ESOPHAGUS MID BIOPSY, esophagus,  tablet 40 mg  40 mg Oral DAILY    metoprolol tartrate (LOPRESSOR) tablet 12.5 mg  12.5 mg Oral Q12H    sodium bicarbonate tablet 325 mg  325 mg Oral TID    pantoprazole (PROTONIX) tablet 40 mg  40 mg Oral DAILY    heparin (porcine) 5,000 unit/mL injection        rifAXIMin (XIFAXAN) tablet 200 mg  200 mg Oral TID    lactulose (CHRONULAC) 10 gram/15 mL solution 15 mL  15 mL Oral TID    albumin human 25% (BUMINATE) solution 25 g  25 g IntraVENous Q12H    insulin lispro (HUMALOG) injection   SubCUTAneous Q6H    glucose chewable tablet 16 g  4 Tab Oral PRN    glucagon (GLUCAGEN) injection 1 mg  1 mg IntraMUSCular PRN    dextrose (D50W) injection syrg 12.5-25 g  25-50 mL IntraVENous PRN         Physical Exam:     Visit Vitals  /80 (BP 1 Location: Left arm, BP Patient Position: At rest)   Pulse 78   Temp 98.2 °F (36.8 °C)   Resp 15   Ht 5' 8\" (1.727 m)   Wt 82.6 kg (182 lb)   SpO2 100%   BMI 27.67 kg/m²     BP Readings from Last 3 Encounters:   12/19/20 115/80     Pulse Readings from Last 3 Encounters:   12/19/20 78     Wt Readings from Last 3 Encounters:   12/19/20 82.6 kg (182 lb)       General:  alert, cooperative, no distress, appears stated age, not able to provide accurate history and appears to confabulate some things  Neck:  nontender, no JVD  Lungs:  clear to auscultation bilaterally  Heart:  regular rate and rhythm, S1, S2 normal, no murmur, click, rub or gallop  Abdomen:  abdomen is soft without significant tenderness, masses, organomegaly or guarding  Extremities:  extremities normal, atraumatic, no cyanosis or edema  Skin: Warm and dry.  no hyperpigmentation, vitiligo, or suspicious lesions  Neuro: alert, oriented x3, affect appropriate, no focal neurological deficits, moves all extremities well, no involuntary movements  Psych: unable to fully assess due to patient factors     Data Review:     Recent Labs     12/19/20  0525 12/18/20  2334   WBC 4.7 5.2   HGB 9.4* 9.5*   HCT 27.1* 27.8*   PLT 59* 54*     Recent Labs     12/19/20  0525 12/18/20  2334    142   K 5.2 5.8*   * 119*   CO2 17* 16*   * 141*   BUN 39* 39*   CREA 1.85* 1.95*   CA 9.2 8.7   ALB 3.2* 3.4   ALT 31 30   INR  --  1.4*       No results found for this or any previous visit.     All Cardiac Markers in the last 24 hours:    Lab Results   Component Value Date/Time    TROIQ 2.10 (HH) 12/19/2020 05:25 AM    TROIQ 1.28 (H) 12/18/2020 11:34 PM       Last Lipid:  No results found for: CHOL, CHOLX, CHLST, CHOLV, HDL, HDLP, LDL, LDLC, DLDLP, TGLX, TRIGL, TRIGP, CHHD, CHHDX    Signed By: Yousif Leal PA     December 19, 2020 middle third, cold forcep, biopsy                         FINAL DIAGNOSIS 01/31/2024    Final                    Value:This result contains rich text formatting which cannot be displayed here.      01/31/2024    Final                    Value:This result contains rich text formatting which cannot be displayed here.    Clinical History 01/31/2024    Final                    Value:This result contains rich text formatting which cannot be displayed here.    Gross Description 01/31/2024    Final                    Value:This result contains rich text formatting which cannot be displayed here.         Assessment/Plan           Francisco J Gates MD

## 2024-07-01 ENCOUNTER — TELEPHONE (OUTPATIENT)
Dept: OTOLARYNGOLOGY | Facility: CLINIC | Age: 69
End: 2024-07-01

## 2024-07-01 ENCOUNTER — APPOINTMENT (OUTPATIENT)
Dept: ALLERGY | Facility: CLINIC | Age: 69
End: 2024-07-01
Payer: MEDICARE

## 2024-07-01 DIAGNOSIS — J30.89 ALLERGIC RHINITIS DUE TO OTHER ALLERGIC TRIGGER, UNSPECIFIED SEASONALITY: Primary | ICD-10-CM

## 2024-07-01 PROCEDURE — 95117 IMMUNOTHERAPY INJECTIONS: CPT | Performed by: ALLERGY & IMMUNOLOGY

## 2024-07-01 NOTE — TELEPHONE ENCOUNTER
Spoke with patient in detail about results. Reassured her the size is mostly what drives whether a biopsy is needed. Postcard reminder set up for 1 year thyroid US, will call sooner with changes.

## 2024-07-08 ENCOUNTER — APPOINTMENT (OUTPATIENT)
Dept: ALLERGY | Facility: CLINIC | Age: 69
End: 2024-07-08
Payer: MEDICARE

## 2024-07-08 DIAGNOSIS — J30.89 ALLERGIC RHINITIS DUE TO OTHER ALLERGIC TRIGGER, UNSPECIFIED SEASONALITY: Primary | ICD-10-CM

## 2024-07-08 PROCEDURE — 95117 IMMUNOTHERAPY INJECTIONS: CPT | Performed by: ALLERGY & IMMUNOLOGY

## 2024-07-15 ENCOUNTER — APPOINTMENT (OUTPATIENT)
Dept: ALLERGY | Facility: CLINIC | Age: 69
End: 2024-07-15
Payer: MEDICARE

## 2024-07-22 ENCOUNTER — APPOINTMENT (OUTPATIENT)
Dept: ALLERGY | Facility: CLINIC | Age: 69
End: 2024-07-22
Payer: MEDICARE

## 2024-07-29 ENCOUNTER — APPOINTMENT (OUTPATIENT)
Dept: ALLERGY | Facility: CLINIC | Age: 69
End: 2024-07-29
Payer: MEDICARE

## 2024-07-29 NOTE — PROGRESS NOTES
See Allergy Shot Immunotherapy Record Book  Allergy checklist done, no concerns                  Subjective   Patient ID:   [id number]   Jenn Antunez is a 69 y.o. female who presents for No chief complaint on file..    No chief complaint on file.         HPI  This patient is here to evaluate for:      Review of Systems      Objective     There were no vitals taken for this visit.     Physical Exam       Current Outpatient Medications   Medication Sig Dispense Refill    acetaminophen (Tylenol) 325 mg tablet Take by mouth every 4 hours if needed.      acyclovir (Zovirax) 400 mg tablet Take 1 tablet (400 mg) by mouth three times daily for 5 days. 15 tablet 11    albuterol (Ventolin HFA) 90 mcg/actuation inhaler Inhale 2 puffs every 4 hours if needed for wheezing or shortness of breath. 36 g 5    albuterol 108 (90 Base) MCG/ACT inhaler Inhale 2 puffs every 6 hours if needed for wheezing.      EPINEPHrine 0.3 mg/0.3 mL injection syringe USE AS DIRECTED IN CASE OF A SEVERE ALLERGIC REACTION      estradiol (Estrace) 0.01 % (0.1 mg/gram) vaginal cream Apply a pea size amount to the vaginal opening three nights a week at bedtime. 34 g 3    fluticasone propion-salmeteroL (Advair HFA) 115-21 mcg/actuation inhaler INHALE 2 PUFFS BY MOUTH EVERY 12 HOURS IN THE MORNING AND IN THE EVENING 12 g 11    fluticasone propion-salmeteroL (Advair HFA) 45-21 mcg/actuation inhaler Inhale 2 puffs 2 times a day. 12 g 11    ibuprofen 200 mg tablet Take 1 tablet (200 mg) by mouth every 6 hours.      lactobacillus acidophilus capsule Take by mouth.      nystatin (Mycostatin) 100,000 unit/gram powder Apply 1 Application topically 3 times a day. 15 g 0    pantoprazole (ProtoNix) 40 mg EC tablet TAKE 1 TABLET BY MOUTH EVERY DAY 90 tablet 1    tetrahydrozoline HCl/zinc sulf (ALLERGY RELIEF EYE DROPS OPHT) Administer 1 drop into both eyes once daily as needed.      tretinoin (Retin-A) 0.05 % cream APPLY PEA SIZED AMOUNT TO FACE EVERY NIGHT      urea  40 % lotion APPLY TO AFFECTED AREAS EVERY DAY       No current facility-administered medications for this visit.       Summary of the labs over the past 6 months:    Office Visit on 06/26/2024   Component Date Value Ref Range Status    Herpes simplex virus 1 PCR, Skin/M* 06/26/2024 Not Detected  Not Detected Final    Herpes simplex virus 2 PCR, Skin/M* 06/26/2024 Not Detected  Not Detected Final   Hospital Outpatient Visit on 05/31/2024   Component Date Value Ref Range Status    POCT Creatinine 05/31/2024 0.61  0.60 - 1.30 mg/dL Final    POCT eGFR 05/31/2024 >90  >=60 mL/min/1.73m*2 Final   Hospital Outpatient Visit on 04/26/2024   Component Date Value Ref Range Status    AV pk margaret 04/26/2024 1.21  m/s Final    AV mn grad 04/26/2024 3.0  mmHg Final    LV Biplane EF 04/26/2024 59  % Final    MV avg E/e' ratio 04/26/2024 20.00   Final    MV E/A ratio 04/26/2024 0.88   Final    LA vol index A/L 04/26/2024 19.8  ml/m2 Final    Tricuspid annular plane systolic e* 04/26/2024 1.8  cm Final    RV free wall pk S' 04/26/2024 11.10  cm/s Final    LVIDd 04/26/2024 5.02  cm Final    RVSP 04/26/2024 24.3  mmHg Final    AV pk grad 04/26/2024 5.9  mmHg Final    LV A4C EF 04/26/2024 55.0   Final   Lab on 03/06/2024   Component Date Value Ref Range Status    Glucose 03/06/2024 103 (H)  74 - 99 mg/dL Final    Sodium 03/06/2024 142  136 - 145 mmol/L Final    Potassium 03/06/2024 4.2  3.5 - 5.3 mmol/L Final    Chloride 03/06/2024 104  98 - 107 mmol/L Final    Bicarbonate 03/06/2024 30  21 - 32 mmol/L Final    Anion Gap 03/06/2024 12  10 - 20 mmol/L Final    Urea Nitrogen 03/06/2024 10  6 - 23 mg/dL Final    Creatinine 03/06/2024 0.63  0.50 - 1.05 mg/dL Final    eGFR 03/06/2024 >90  >60 mL/min/1.73m*2 Final    Calcium 03/06/2024 9.3  8.6 - 10.3 mg/dL Final    Albumin 03/06/2024 4.1  3.4 - 5.0 g/dL Final    Alkaline Phosphatase 03/06/2024 68  33 - 136 U/L Final    Total Protein 03/06/2024 6.2 (L)  6.4 - 8.2 g/dL Final    AST 03/06/2024 41  "(H)  9 - 39 U/L Final    Bilirubin, Total 03/06/2024 0.6  0.0 - 1.2 mg/dL Final    ALT 03/06/2024 60 (H)  7 - 45 U/L Final    Creatine Kinase 03/06/2024 82  0 - 215 U/L Final    Magnesium 03/06/2024 1.96  1.60 - 2.40 mg/dL Final   Lab Requisition on 02/27/2024   Component Date Value Ref Range Status    Case Report 02/27/2024    Final                    Value:Surgical Pathology                                Case: [id number]                                  Authorizing Provider:  Ankit Marvin MD     Collected:           02/27/2024 1400              Ordering Location:     [facility]       Received:            02/28/2024 [address] [place] [address]                                                                       Pathologist:           Julieta Rodriguez MD                                                          Specimen:    EYELID RIGHT, RIGHT LOWER LID                                                              FINAL DIAGNOSIS 02/27/2024    Final                    Value:A. SKIN, RIGHT LOWER LID, BIOPSY:                           --VERRUCAL KERATOSIS      02/27/2024    Final                    Value:By the signature on this report, the individual or group listed as making                           the Final Interpretation/Diagnosis certifies that they have reviewed this                           case.     Clinical History 02/27/2024    Final                    Value:D49.2    Gross Description 02/27/2024    Final                    Value:Received in formalin, labeled with the patient's name and hospital number                           and \"right lower lid\", is a shave biopsy of skin measuring 0.5 x 0.4 x 0.4                           cm.  The skin surface shows an ovoid, tan-white, elevated area with                           multiple minute papillary projections. The apparent resection margin is                           inked. The specimen is submitted in toto in one " "cassette.                            [facility]   Lab Requisition on 01/31/2024   Component Date Value Ref Range Status    Case Report 01/31/2024    Final                    Value:Surgical Pathology                                Case: [id number]                                  Authorizing Provider:  Cherise Ramirez MD            Collected:           01/31/2024 1035              Ordering Location:     [facility]       Received:            02/01/2024 0618                                     Center                                                                       Pathologist:           Francis Grande MD                                                       Specimen:    ESOPHAGUS MID BIOPSY, esophagus, middle third, cold forcep, biopsy                         FINAL DIAGNOSIS 01/31/2024    Final                    Value:Mid esophagus biopsy:                          Fragments of squamous mucosa with mild focal congestion and reactive                           change                          No diagnostic features of eosinophilic esophagitis seen                          No dysplasia seen      01/31/2024    Final                    Value:By the signature on this report, the individual or group listed as making                           the Final Interpretation/Diagnosis certifies that they have reviewed this                           case.     Clinical History 01/31/2024    Final                    Value:Dysphagia, gastro-esophageal reflux disease                          A: dysphagia, r/o EOE    Gross Description 01/31/2024    Final                    Value:A: Received in formalin, labeled with the patient's name and hospital                           number and \"1-esophagus-middle third, cold forcep\", are 2 fragments of                           tan, soft tissue aggregating to 0.7 x 0.2 x 0.1 cm. The specimen is                           submitted in toto in one cassette.                          LMP "         Assessment/Plan           Francisco J Gates MD

## 2024-07-29 NOTE — PROGRESS NOTES
See Allergy Shot Immunotherapy Record Book  Allergy checklist done, no concerns                  Subjective   Patient ID:   [id number]   Jenn Antunez is a 69 y.o. female who presents for No chief complaint on file..    No chief complaint on file.         HPI  This patient is here to evaluate for:      Review of Systems      Objective     There were no vitals taken for this visit.     Physical Exam       Current Outpatient Medications   Medication Sig Dispense Refill    acetaminophen (Tylenol) 325 mg tablet Take by mouth every 4 hours if needed.      acyclovir (Zovirax) 400 mg tablet Take 1 tablet (400 mg) by mouth three times daily for 5 days. 15 tablet 11    albuterol (Ventolin HFA) 90 mcg/actuation inhaler Inhale 2 puffs every 4 hours if needed for wheezing or shortness of breath. 36 g 5    albuterol 108 (90 Base) MCG/ACT inhaler Inhale 2 puffs every 6 hours if needed for wheezing.      EPINEPHrine 0.3 mg/0.3 mL injection syringe USE AS DIRECTED IN CASE OF A SEVERE ALLERGIC REACTION      estradiol (Estrace) 0.01 % (0.1 mg/gram) vaginal cream Apply a pea size amount to the vaginal opening three nights a week at bedtime. 34 g 3    fluticasone propion-salmeteroL (Advair HFA) 115-21 mcg/actuation inhaler INHALE 2 PUFFS BY MOUTH EVERY 12 HOURS IN THE MORNING AND IN THE EVENING 12 g 11    fluticasone propion-salmeteroL (Advair HFA) 45-21 mcg/actuation inhaler Inhale 2 puffs 2 times a day. 12 g 11    ibuprofen 200 mg tablet Take 1 tablet (200 mg) by mouth every 6 hours.      lactobacillus acidophilus capsule Take by mouth.      nystatin (Mycostatin) 100,000 unit/gram powder Apply 1 Application topically 3 times a day. 15 g 0    pantoprazole (ProtoNix) 40 mg EC tablet TAKE 1 TABLET BY MOUTH EVERY DAY 90 tablet 1    tetrahydrozoline HCl/zinc sulf (ALLERGY RELIEF EYE DROPS OPHT) Administer 1 drop into both eyes once daily as needed.      tretinoin (Retin-A) 0.05 % cream APPLY PEA SIZED AMOUNT TO FACE EVERY NIGHT      urea  40 % lotion APPLY TO AFFECTED AREAS EVERY DAY       No current facility-administered medications for this visit.       Summary of the labs over the past 6 months:    Office Visit on 06/26/2024   Component Date Value Ref Range Status    Herpes simplex virus 1 PCR, Skin/M* 06/26/2024 Not Detected  Not Detected Final    Herpes simplex virus 2 PCR, Skin/M* 06/26/2024 Not Detected  Not Detected Final   Hospital Outpatient Visit on 05/31/2024   Component Date Value Ref Range Status    POCT Creatinine 05/31/2024 0.61  0.60 - 1.30 mg/dL Final    POCT eGFR 05/31/2024 >90  >=60 mL/min/1.73m*2 Final   Hospital Outpatient Visit on 04/26/2024   Component Date Value Ref Range Status    AV pk margaret 04/26/2024 1.21  m/s Final    AV mn grad 04/26/2024 3.0  mmHg Final    LV Biplane EF 04/26/2024 59  % Final    MV avg E/e' ratio 04/26/2024 20.00   Final    MV E/A ratio 04/26/2024 0.88   Final    LA vol index A/L 04/26/2024 19.8  ml/m2 Final    Tricuspid annular plane systolic e* 04/26/2024 1.8  cm Final    RV free wall pk S' 04/26/2024 11.10  cm/s Final    LVIDd 04/26/2024 5.02  cm Final    RVSP 04/26/2024 24.3  mmHg Final    AV pk grad 04/26/2024 5.9  mmHg Final    LV A4C EF 04/26/2024 55.0   Final   Lab on 03/06/2024   Component Date Value Ref Range Status    Glucose 03/06/2024 103 (H)  74 - 99 mg/dL Final    Sodium 03/06/2024 142  136 - 145 mmol/L Final    Potassium 03/06/2024 4.2  3.5 - 5.3 mmol/L Final    Chloride 03/06/2024 104  98 - 107 mmol/L Final    Bicarbonate 03/06/2024 30  21 - 32 mmol/L Final    Anion Gap 03/06/2024 12  10 - 20 mmol/L Final    Urea Nitrogen 03/06/2024 10  6 - 23 mg/dL Final    Creatinine 03/06/2024 0.63  0.50 - 1.05 mg/dL Final    eGFR 03/06/2024 >90  >60 mL/min/1.73m*2 Final    Calcium 03/06/2024 9.3  8.6 - 10.3 mg/dL Final    Albumin 03/06/2024 4.1  3.4 - 5.0 g/dL Final    Alkaline Phosphatase 03/06/2024 68  33 - 136 U/L Final    Total Protein 03/06/2024 6.2 (L)  6.4 - 8.2 g/dL Final    AST 03/06/2024 41  "(H)  9 - 39 U/L Final    Bilirubin, Total 03/06/2024 0.6  0.0 - 1.2 mg/dL Final    ALT 03/06/2024 60 (H)  7 - 45 U/L Final    Creatine Kinase 03/06/2024 82  0 - 215 U/L Final    Magnesium 03/06/2024 1.96  1.60 - 2.40 mg/dL Final   Lab Requisition on 02/27/2024   Component Date Value Ref Range Status    Case Report 02/27/2024    Final                    Value:Surgical Pathology                                Case: [id number]                                  Authorizing Provider:  Ankit Marvin MD     Collected:           02/27/2024 1400              Ordering Location:     [facility]       Received:            02/28/2024 [address] [place] [address]                                                                       Pathologist:           Julieta Rodriguez MD                                                          Specimen:    EYELID RIGHT, RIGHT LOWER LID                                                              FINAL DIAGNOSIS 02/27/2024    Final                    Value:A. SKIN, RIGHT LOWER LID, BIOPSY:                           --VERRUCAL KERATOSIS      02/27/2024    Final                    Value:By the signature on this report, the individual or group listed as making                           the Final Interpretation/Diagnosis certifies that they have reviewed this                           case.     Clinical History 02/27/2024    Final                    Value:D49.2    Gross Description 02/27/2024    Final                    Value:Received in formalin, labeled with the patient's name and hospital number                           and \"right lower lid\", is a shave biopsy of skin measuring 0.5 x 0.4 x 0.4                           cm.  The skin surface shows an ovoid, tan-white, elevated area with                           multiple minute papillary projections. The apparent resection margin is                           inked. The specimen is submitted in toto in one " "cassette.                            [facility]   Lab Requisition on 01/31/2024   Component Date Value Ref Range Status    Case Report 01/31/2024    Final                    Value:Surgical Pathology                                Case: [id number]                                  Authorizing Provider:  Cherise Ramirez MD            Collected:           01/31/2024 1035              Ordering Location:     [facility]       Received:            02/01/2024 0618                                     Center                                                                       Pathologist:           Francis Grande MD                                                       Specimen:    ESOPHAGUS MID BIOPSY, esophagus, middle third, cold forcep, biopsy                         FINAL DIAGNOSIS 01/31/2024    Final                    Value:Mid esophagus biopsy:                          Fragments of squamous mucosa with mild focal congestion and reactive                           change                          No diagnostic features of eosinophilic esophagitis seen                          No dysplasia seen      01/31/2024    Final                    Value:By the signature on this report, the individual or group listed as making                           the Final Interpretation/Diagnosis certifies that they have reviewed this                           case.     Clinical History 01/31/2024    Final                    Value:Dysphagia, gastro-esophageal reflux disease                          A: dysphagia, r/o EOE    Gross Description 01/31/2024    Final                    Value:A: Received in formalin, labeled with the patient's name and hospital                           number and \"1-esophagus-middle third, cold forcep\", are 2 fragments of                           tan, soft tissue aggregating to 0.7 x 0.2 x 0.1 cm. The specimen is                           submitted in toto in one cassette.                          LMP "         Assessment/Plan           Francisco J Gates MD

## 2024-08-05 ENCOUNTER — APPOINTMENT (OUTPATIENT)
Dept: ALLERGY | Facility: CLINIC | Age: 69
End: 2024-08-05
Payer: MEDICARE

## 2024-08-05 DIAGNOSIS — J30.89 ALLERGIC RHINITIS DUE TO OTHER ALLERGIC TRIGGER, UNSPECIFIED SEASONALITY: ICD-10-CM

## 2024-08-05 PROCEDURE — 95117 IMMUNOTHERAPY INJECTIONS: CPT | Performed by: ALLERGY & IMMUNOLOGY

## 2024-08-22 ENCOUNTER — TELEPHONE (OUTPATIENT)
Dept: ALLERGY | Facility: CLINIC | Age: 69
End: 2024-08-22

## 2024-08-26 ENCOUNTER — APPOINTMENT (OUTPATIENT)
Dept: ALLERGY | Facility: CLINIC | Age: 69
End: 2024-08-26
Payer: MEDICARE

## 2024-08-27 ENCOUNTER — APPOINTMENT (OUTPATIENT)
Dept: ALLERGY | Facility: CLINIC | Age: 69
End: 2024-08-27
Payer: MEDICARE

## 2024-08-27 DIAGNOSIS — J30.81 ALLERGIC RHINITIS DUE TO ANIMAL (CAT) (DOG) HAIR AND DANDER: ICD-10-CM

## 2024-08-27 DIAGNOSIS — H10.45 CHRONIC ALLERGIC CONJUNCTIVITIS: Primary | ICD-10-CM

## 2024-08-27 DIAGNOSIS — J45.30 MILD PERSISTENT ASTHMA WITHOUT STATUS ASTHMATICUS WITHOUT COMPLICATION (HHS-HCC): ICD-10-CM

## 2024-08-27 DIAGNOSIS — J30.89 ALLERGIC RHINITIS DUE TO OTHER ALLERGIC TRIGGER, UNSPECIFIED SEASONALITY: ICD-10-CM

## 2024-08-27 DIAGNOSIS — T50.Z95A ADVERSE EFFECT OF OTHER VACCINES AND BIOLOGICAL SUBSTANCES, INITIAL ENCOUNTER: ICD-10-CM

## 2024-08-27 PROCEDURE — 99214 OFFICE O/P EST MOD 30 MIN: CPT | Performed by: ALLERGY & IMMUNOLOGY

## 2024-08-27 PROCEDURE — 95117 IMMUNOTHERAPY INJECTIONS: CPT | Performed by: ALLERGY & IMMUNOLOGY

## 2024-08-27 PROCEDURE — 95004 PERQ TESTS W/ALRGNC XTRCS: CPT | Performed by: ALLERGY & IMMUNOLOGY

## 2024-08-27 RX ORDER — EPINEPHRINE 0.3 MG/.3ML
1 INJECTION SUBCUTANEOUS ONCE AS NEEDED
Qty: 2 EACH | Refills: 3 | Status: SHIPPED | OUTPATIENT
Start: 2024-08-27

## 2024-08-27 NOTE — PROGRESS NOTES
"Subjective   Patient ID:   [id number]   Jenn Antunez is a 69 y.o. female who presents for No chief complaint on file..    No chief complaint on file.         HPI  This patient is here to evaluate for:    YEARLY RENEWAL OF VIALS:    Immunotherapy started: 23 (but she also had allergy shots prior to this)  This patient is taking allergy shots receiving (ml): 0.5 (achieved maintenance )  every: 4wk  Vials : in 2 weeks  Last shot: today  Current symptoms include:  Peak flows:   Location: [place]   Symptoms on the allergy shots have improved.  Medications used are per the chart.    This patient is not on a beta blocker.  There have been no systemic or delayed allergic reactions to the allergy immunotherapy. The size of the shot reactions are small.     Cow's milk - like ice cream or regular milk - sinuses congestion  Especially the next day. Malaise.     Asthma - not taking her advair due to cost  Not sure it helps   Albuterol used - such as if she is outdoors walking. Used preventively.   Got sick 1 year ago and afterwards, noticing shortness of breath, phlegm in her throat. That is better but still needs it with exertion/exercise outdoors.     There are no nighttime awakenings. Exercise limitation is denied.  She sees [facility] pulmonary.    previously reported:     Pretty terrible since off the allergy shots.   \"Chronic fatigue related to her allergies\" despite sleeping 10 hrs/day  +sneezing  Not sure what to take.      mowed grass and had a reaction  asthma sx and needed albuterol; felt better.      Using flonase.   Can't take zyrtec/claritin/allegra due to sedation.      we reviewed the allergy testing from      sh: she has a dog and wanted to make   denies any reaction from the dog licking her or scratching.     Review of Systems   All other systems reviewed and are negative.        Objective     There were no vitals taken for this visit.     Physical Exam  Constitutional:       General: She is not " in acute distress.     Appearance: Normal appearance. She is not ill-appearing.   HENT:      Head: Normocephalic and atraumatic.      Right Ear: Tympanic membrane, ear canal and external ear normal.      Left Ear: Tympanic membrane, ear canal and external ear normal.      Nose: Nose normal. No congestion or rhinorrhea.      Mouth/Throat:      Mouth: Mucous membranes are moist.      Pharynx: Oropharynx is clear. No oropharyngeal exudate or posterior oropharyngeal erythema.   Eyes:      General:         Right eye: No discharge.         Left eye: No discharge.      Conjunctiva/sclera: Conjunctivae normal.   Cardiovascular:      Rate and Rhythm: Normal rate and regular rhythm.      Heart sounds: Normal heart sounds. No murmur heard.     No friction rub. No gallop.   Pulmonary:      Effort: Pulmonary effort is normal. No respiratory distress.      Breath sounds: Normal breath sounds. No stridor. No wheezing, rhonchi or rales.   Chest:      Chest wall: No tenderness.   Abdominal:      General: Abdomen is flat.      Palpations: Abdomen is soft.   Musculoskeletal:         General: Normal range of motion.      Cervical back: Normal range of motion and neck supple.   Lymphadenopathy:      Cervical: No cervical adenopathy.   Skin:     General: Skin is warm and dry.      Findings: No erythema, lesion or rash.   Neurological:      General: No focal deficit present.      Mental Status: She is alert. Mental status is at baseline.   Psychiatric:         Mood and Affect: Mood normal.         Behavior: Behavior normal.         Thought Content: Thought content normal.         Judgment: Judgment normal.            Current Outpatient Medications   Medication Sig Dispense Refill    acetaminophen (Tylenol) 325 mg tablet Take by mouth every 4 hours if needed.      acyclovir (Zovirax) 400 mg tablet Take 1 tablet (400 mg) by mouth three times daily for 5 days. 15 tablet 11    albuterol (Ventolin HFA) 90 mcg/actuation inhaler Inhale 2 puffs  every 4 hours if needed for wheezing or shortness of breath. 36 g 5    albuterol 108 (90 Base) MCG/ACT inhaler Inhale 2 puffs every 6 hours if needed for wheezing.      EPINEPHrine 0.3 mg/0.3 mL injection syringe USE AS DIRECTED IN CASE OF A SEVERE ALLERGIC REACTION      estradiol (Estrace) 0.01 % (0.1 mg/gram) vaginal cream Apply a pea size amount to the vaginal opening three nights a week at bedtime. 34 g 3    fluticasone propion-salmeteroL (Advair HFA) 115-21 mcg/actuation inhaler INHALE 2 PUFFS BY MOUTH EVERY 12 HOURS IN THE MORNING AND IN THE EVENING 12 g 11    fluticasone propion-salmeteroL (Advair HFA) 45-21 mcg/actuation inhaler Inhale 2 puffs 2 times a day. 12 g 11    ibuprofen 200 mg tablet Take 1 tablet (200 mg) by mouth every 6 hours.      lactobacillus acidophilus capsule Take by mouth.      nystatin (Mycostatin) 100,000 unit/gram powder Apply 1 Application topically 3 times a day. 15 g 0    pantoprazole (ProtoNix) 40 mg EC tablet TAKE 1 TABLET BY MOUTH EVERY DAY 90 tablet 1    tetrahydrozoline HCl/zinc sulf (ALLERGY RELIEF EYE DROPS OPHT) Administer 1 drop into both eyes once daily as needed.      tretinoin (Retin-A) 0.05 % cream APPLY PEA SIZED AMOUNT TO FACE EVERY NIGHT      urea 40 % lotion APPLY TO AFFECTED AREAS EVERY DAY       No current facility-administered medications for this visit.       Summary of the labs over the past 6 months:    Office Visit on 06/26/2024   Component Date Value Ref Range Status    Herpes simplex virus 1 PCR, Skin/M* 06/26/2024 Not Detected  Not Detected Final    Herpes simplex virus 2 PCR, Skin/M* 06/26/2024 Not Detected  Not Detected Final   Hospital Outpatient Visit on 05/31/2024   Component Date Value Ref Range Status    POCT Creatinine 05/31/2024 0.61  0.60 - 1.30 mg/dL Final    POCT eGFR 05/31/2024 >90  >=60 mL/min/1.73m*2 Final   Hospital Outpatient Visit on 04/26/2024   Component Date Value Ref Range Status    AV pk margaret 04/26/2024 1.21  m/s Final    AV mn grad  04/26/2024 3.0  mmHg Final    LV Biplane EF 04/26/2024 59  % Final    MV avg E/e' ratio 04/26/2024 20.00   Final    MV E/A ratio 04/26/2024 0.88   Final    LA vol index A/L 04/26/2024 19.8  ml/m2 Final    Tricuspid annular plane systolic e* 04/26/2024 1.8  cm Final    RV free wall pk S' 04/26/2024 11.10  cm/s Final    LVIDd 04/26/2024 5.02  cm Final    RVSP 04/26/2024 24.3  mmHg Final    AV pk grad 04/26/2024 5.9  mmHg Final    LV A4C EF 04/26/2024 55.0   Final   Lab on 03/06/2024   Component Date Value Ref Range Status    Glucose 03/06/2024 103 (H)  74 - 99 mg/dL Final    Sodium 03/06/2024 142  136 - 145 mmol/L Final    Potassium 03/06/2024 4.2  3.5 - 5.3 mmol/L Final    Chloride 03/06/2024 104  98 - 107 mmol/L Final    Bicarbonate 03/06/2024 30  21 - 32 mmol/L Final    Anion Gap 03/06/2024 12  10 - 20 mmol/L Final    Urea Nitrogen 03/06/2024 10  6 - 23 mg/dL Final    Creatinine 03/06/2024 0.63  0.50 - 1.05 mg/dL Final    eGFR 03/06/2024 >90  >60 mL/min/1.73m*2 Final    Calcium 03/06/2024 9.3  8.6 - 10.3 mg/dL Final    Albumin 03/06/2024 4.1  3.4 - 5.0 g/dL Final    Alkaline Phosphatase 03/06/2024 68  33 - 136 U/L Final    Total Protein 03/06/2024 6.2 (L)  6.4 - 8.2 g/dL Final    AST 03/06/2024 41 (H)  9 - 39 U/L Final    Bilirubin, Total 03/06/2024 0.6  0.0 - 1.2 mg/dL Final    ALT 03/06/2024 60 (H)  7 - 45 U/L Final    Creatine Kinase 03/06/2024 82  0 - 215 U/L Final    Magnesium 03/06/2024 1.96  1.60 - 2.40 mg/dL Final   Lab Requisition on 02/27/2024   Component Date Value Ref Range Status    Case Report 02/27/2024    Final                    Value:Surgical Pathology                                Case: [id number]                                  Authorizing Provider:  Anikt Marvin MD     Collected:           02/27/2024 1400              Ordering Location:     [facility]       Received:            02/28/2024 [address] [place] [address]                                           "                             Pathologist:           Julieta Rodriguez MD                                                          Specimen:    EYELID RIGHT, RIGHT LOWER LID                                                              FINAL DIAGNOSIS 02/27/2024    Final                    Value:A. SKIN, RIGHT LOWER LID, BIOPSY:                           --VERRUCAL KERATOSIS      02/27/2024    Final                    Value:By the signature on this report, the individual or group listed as making                           the Final Interpretation/Diagnosis certifies that they have reviewed this                           case.     Clinical History 02/27/2024    Final                    Value:D49.2    Gross Description 02/27/2024    Final                    Value:Received in formalin, labeled with the patient's name and hospital number                           and \"right lower lid\", is a shave biopsy of skin measuring 0.5 x 0.4 x 0.4                           cm.  The skin surface shows an ovoid, tan-white, elevated area with                           multiple minute papillary projections. The apparent resection margin is                           inked. The specimen is submitted in toto in one cassette.                            [facility]         Assessment/Plan   Diagnoses and all orders for this visit:  Chronic allergic conjunctivitis  Allergic rhinitis due to other allergic trigger, unspecified seasonality  Allergic rhinitis due to animal (cat) (dog) hair and dander  Mild persistent asthma without status asthmaticus without complication ([facility])      I reviewed the previous allergy skin testing and current vial formulas. We reviewed the benefits of immunotherapy and continuing maintenance immunotherapy dosing at approximately once per month. We assessed for improvement of symptoms and for any occurrence of local, delayed, or systemic reactions. The patient's symptoms have improved since starting immunotherapy and they " have not had any systemic or significant allergic reactions.     The patient has an epinephrine autoinjector, brings it to the injection visits, and is aware of how to use it and waits 30 minutes after the shot as directed.     Medications have been refilled as needed.     Plan is for 1 additional years of allergy immunotherapy.   Next year, will decide whether we can stop shots.     Of note, she follows with Pulmonary for her asthma medications.     The patient may use albuterol taking two puffs 15 minutes prior to exercise or every four hours as needed for cough, wheeze, or shortness of breath.       Francisco J Gates MD

## 2024-08-29 DIAGNOSIS — J30.89 ALLERGIC RHINITIS DUE TO OTHER ALLERGIC TRIGGER, UNSPECIFIED SEASONALITY: Primary | ICD-10-CM

## 2024-08-29 PROCEDURE — 95165 ANTIGEN THERAPY SERVICES: CPT | Performed by: ALLERGY & IMMUNOLOGY

## 2024-09-13 ENCOUNTER — TELEPHONE (OUTPATIENT)
Dept: PRIMARY CARE | Facility: CLINIC | Age: 69
End: 2024-09-13
Payer: COMMERCIAL

## 2024-09-13 NOTE — TELEPHONE ENCOUNTER
Patient wants to start a weight loss clinic.  Needs a referral also needs faxed last 2 office visits and 2 labs.  Faxed to [phone] Weight [facility] center

## 2024-09-19 ENCOUNTER — HOSPITAL ENCOUNTER (EMERGENCY)
Facility: HOSPITAL | Age: 69
Discharge: HOME | End: 2024-09-20
Payer: MEDICARE

## 2024-09-19 ENCOUNTER — APPOINTMENT (OUTPATIENT)
Dept: CARDIOLOGY | Facility: HOSPITAL | Age: 69
End: 2024-09-19
Payer: MEDICARE

## 2024-09-19 ENCOUNTER — APPOINTMENT (OUTPATIENT)
Dept: RADIOLOGY | Facility: HOSPITAL | Age: 69
End: 2024-09-19
Payer: MEDICARE

## 2024-09-19 DIAGNOSIS — K59.00 CONSTIPATION, UNSPECIFIED CONSTIPATION TYPE: Primary | ICD-10-CM

## 2024-09-19 DIAGNOSIS — R10.11 RIGHT UPPER QUADRANT ABDOMINAL PAIN: ICD-10-CM

## 2024-09-19 DIAGNOSIS — K44.9 HIATAL HERNIA: ICD-10-CM

## 2024-09-19 LAB
ALBUMIN SERPL BCP-MCNC: 4 G/DL (ref 3.4–5)
ALP SERPL-CCNC: 65 U/L (ref 33–136)
ALT SERPL W P-5'-P-CCNC: 23 U/L (ref 7–45)
ANION GAP SERPL CALC-SCNC: 14 MMOL/L (ref 10–20)
AST SERPL W P-5'-P-CCNC: 17 U/L (ref 9–39)
BASOPHILS # BLD AUTO: 0.04 X10*3/UL (ref 0–0.1)
BASOPHILS NFR BLD AUTO: 0.5 %
BILIRUB SERPL-MCNC: 0.4 MG/DL (ref 0–1.2)
BUN SERPL-MCNC: 21 MG/DL (ref 6–23)
CALCIUM SERPL-MCNC: 8.7 MG/DL (ref 8.6–10.3)
CARDIAC TROPONIN I PNL SERPL HS: 3 NG/L (ref 0–13)
CHLORIDE SERPL-SCNC: 106 MMOL/L (ref 98–107)
CO2 SERPL-SCNC: 25 MMOL/L (ref 21–32)
CREAT SERPL-MCNC: 0.6 MG/DL (ref 0.5–1.05)
EGFRCR SERPLBLD CKD-EPI 2021: >90 ML/MIN/1.73M*2
EOSINOPHIL # BLD AUTO: 0.21 X10*3/UL (ref 0–0.7)
EOSINOPHIL NFR BLD AUTO: 2.5 %
ERYTHROCYTE [DISTWIDTH] IN BLOOD BY AUTOMATED COUNT: 13.6 % (ref 11.5–14.5)
GLUCOSE SERPL-MCNC: 119 MG/DL (ref 74–99)
HCT VFR BLD AUTO: 37.5 % (ref 36–46)
HGB BLD-MCNC: 12.5 G/DL (ref 12–16)
IMM GRANULOCYTES # BLD AUTO: 0.02 X10*3/UL (ref 0–0.7)
IMM GRANULOCYTES NFR BLD AUTO: 0.2 % (ref 0–0.9)
INR PPP: 1 (ref 0.9–1.1)
LACTATE SERPL-SCNC: 1.5 MMOL/L (ref 0.4–2)
LIPASE SERPL-CCNC: 27 U/L (ref 9–82)
LYMPHOCYTES # BLD AUTO: 3.08 X10*3/UL (ref 1.2–4.8)
LYMPHOCYTES NFR BLD AUTO: 36.8 %
MAGNESIUM SERPL-MCNC: 2.02 MG/DL (ref 1.6–2.4)
MCH RBC QN AUTO: 30 PG (ref 26–34)
MCHC RBC AUTO-ENTMCNC: 33.3 G/DL (ref 32–36)
MCV RBC AUTO: 90 FL (ref 80–100)
MONOCYTES # BLD AUTO: 0.78 X10*3/UL (ref 0.1–1)
MONOCYTES NFR BLD AUTO: 9.3 %
NEUTROPHILS # BLD AUTO: 4.24 X10*3/UL (ref 1.2–7.7)
NEUTROPHILS NFR BLD AUTO: 50.7 %
NRBC BLD-RTO: 0 /100 WBCS (ref 0–0)
PLATELET # BLD AUTO: 265 X10*3/UL (ref 150–450)
POTASSIUM SERPL-SCNC: 3.7 MMOL/L (ref 3.5–5.3)
PROT SERPL-MCNC: 6.2 G/DL (ref 6.4–8.2)
PROTHROMBIN TIME: 11.2 SECONDS (ref 9.8–12.8)
RBC # BLD AUTO: 4.17 X10*6/UL (ref 4–5.2)
SODIUM SERPL-SCNC: 141 MMOL/L (ref 136–145)
WBC # BLD AUTO: 8.4 X10*3/UL (ref 4.4–11.3)

## 2024-09-19 PROCEDURE — 71045 X-RAY EXAM CHEST 1 VIEW: CPT

## 2024-09-19 PROCEDURE — 81003 URINALYSIS AUTO W/O SCOPE: CPT | Performed by: HEALTH CARE PROVIDER

## 2024-09-19 PROCEDURE — 84484 ASSAY OF TROPONIN QUANT: CPT | Performed by: HEALTH CARE PROVIDER

## 2024-09-19 PROCEDURE — 85610 PROTHROMBIN TIME: CPT | Performed by: HEALTH CARE PROVIDER

## 2024-09-19 PROCEDURE — 76705 ECHO EXAM OF ABDOMEN: CPT | Performed by: RADIOLOGY

## 2024-09-19 PROCEDURE — 2500000004 HC RX 250 GENERAL PHARMACY W/ HCPCS (ALT 636 FOR OP/ED): Performed by: HEALTH CARE PROVIDER

## 2024-09-19 PROCEDURE — 83735 ASSAY OF MAGNESIUM: CPT | Performed by: HEALTH CARE PROVIDER

## 2024-09-19 PROCEDURE — 36415 COLL VENOUS BLD VENIPUNCTURE: CPT | Performed by: HEALTH CARE PROVIDER

## 2024-09-19 PROCEDURE — 96360 HYDRATION IV INFUSION INIT: CPT

## 2024-09-19 PROCEDURE — 85025 COMPLETE CBC W/AUTO DIFF WBC: CPT | Performed by: HEALTH CARE PROVIDER

## 2024-09-19 PROCEDURE — 93005 ELECTROCARDIOGRAM TRACING: CPT

## 2024-09-19 PROCEDURE — 71045 X-RAY EXAM CHEST 1 VIEW: CPT | Performed by: RADIOLOGY

## 2024-09-19 PROCEDURE — 2550000001 HC RX 255 CONTRASTS: Performed by: HEALTH CARE PROVIDER

## 2024-09-19 PROCEDURE — 74177 CT ABD & PELVIS W/CONTRAST: CPT

## 2024-09-19 PROCEDURE — 80053 COMPREHEN METABOLIC PANEL: CPT | Performed by: HEALTH CARE PROVIDER

## 2024-09-19 PROCEDURE — 83690 ASSAY OF LIPASE: CPT | Performed by: HEALTH CARE PROVIDER

## 2024-09-19 PROCEDURE — 99285 EMERGENCY DEPT VISIT HI MDM: CPT | Mod: 25

## 2024-09-19 PROCEDURE — 74177 CT ABD & PELVIS W/CONTRAST: CPT | Performed by: RADIOLOGY

## 2024-09-19 PROCEDURE — 76705 ECHO EXAM OF ABDOMEN: CPT

## 2024-09-19 PROCEDURE — 83605 ASSAY OF LACTIC ACID: CPT | Performed by: HEALTH CARE PROVIDER

## 2024-09-19 PROCEDURE — 87086 URINE CULTURE/COLONY COUNT: CPT | Mod: GEALAB | Performed by: HEALTH CARE PROVIDER

## 2024-09-19 RX ADMIN — SODIUM CHLORIDE, POTASSIUM CHLORIDE, SODIUM LACTATE AND CALCIUM CHLORIDE 500 ML: 600; 310; 30; 20 INJECTION, SOLUTION INTRAVENOUS at 22:47

## 2024-09-19 RX ADMIN — IOHEXOL 75 ML: 350 INJECTION, SOLUTION INTRAVENOUS at 23:31

## 2024-09-19 ASSESSMENT — PAIN DESCRIPTION - ORIENTATION: ORIENTATION: RIGHT

## 2024-09-19 ASSESSMENT — COLUMBIA-SUICIDE SEVERITY RATING SCALE - C-SSRS
2. HAVE YOU ACTUALLY HAD ANY THOUGHTS OF KILLING YOURSELF?: NO
1. IN THE PAST MONTH, HAVE YOU WISHED YOU WERE DEAD OR WISHED YOU COULD GO TO SLEEP AND NOT WAKE UP?: NO
6. HAVE YOU EVER DONE ANYTHING, STARTED TO DO ANYTHING, OR PREPARED TO DO ANYTHING TO END YOUR LIFE?: NO

## 2024-09-19 ASSESSMENT — PAIN SCALES - GENERAL: PAINLEVEL_OUTOF10: 6

## 2024-09-19 ASSESSMENT — PAIN DESCRIPTION - LOCATION: LOCATION: RIB CAGE

## 2024-09-19 ASSESSMENT — PAIN - FUNCTIONAL ASSESSMENT: PAIN_FUNCTIONAL_ASSESSMENT: 0-10

## 2024-09-20 ENCOUNTER — APPOINTMENT (OUTPATIENT)
Dept: ALLERGY | Facility: CLINIC | Age: 69
End: 2024-09-20
Payer: MEDICARE

## 2024-09-20 VITALS
TEMPERATURE: 97.7 F | WEIGHT: 160 LBS | HEART RATE: 72 BPM | OXYGEN SATURATION: 97 % | RESPIRATION RATE: 17 BRPM | HEIGHT: 63 IN | BODY MASS INDEX: 28.35 KG/M2 | SYSTOLIC BLOOD PRESSURE: 140 MMHG | DIASTOLIC BLOOD PRESSURE: 76 MMHG

## 2024-09-20 LAB
APPEARANCE UR: CLEAR
ATRIAL RATE: 73 BPM
BILIRUB UR STRIP.AUTO-MCNC: NEGATIVE MG/DL
COLOR UR: ABNORMAL
GLUCOSE UR STRIP.AUTO-MCNC: NORMAL MG/DL
HOLD SPECIMEN: NORMAL
KETONES UR STRIP.AUTO-MCNC: NEGATIVE MG/DL
LEUKOCYTE ESTERASE UR QL STRIP.AUTO: ABNORMAL
NITRITE UR QL STRIP.AUTO: NEGATIVE
P AXIS: 48 DEGREES
P OFFSET: 176 MS
P ONSET: 122 MS
PH UR STRIP.AUTO: 6 [PH]
PR INTERVAL: 174 MS
PROT UR STRIP.AUTO-MCNC: NEGATIVE MG/DL
Q ONSET: 209 MS
QRS COUNT: 13 BEATS
QRS DURATION: 90 MS
QT INTERVAL: 424 MS
QTC CALCULATION(BAZETT): 467 MS
QTC FREDERICIA: 452 MS
R AXIS: -18 DEGREES
RBC # UR STRIP.AUTO: NEGATIVE /UL
RBC #/AREA URNS AUTO: NORMAL /HPF
SP GR UR STRIP.AUTO: 1.04
SQUAMOUS #/AREA URNS AUTO: NORMAL /HPF
T AXIS: 33 DEGREES
T OFFSET: 421 MS
UROBILINOGEN UR STRIP.AUTO-MCNC: NORMAL MG/DL
VENTRICULAR RATE: 73 BPM
WBC #/AREA URNS AUTO: NORMAL /HPF

## 2024-09-20 ASSESSMENT — PAIN SCALES - GENERAL: PAINLEVEL_OUTOF10: 0 - NO PAIN

## 2024-09-20 NOTE — ED PROVIDER NOTES
HPI   Chief Complaint   Patient presents with    Abdominal Pain     RUQ       CC: Right upper quadrant abdominal tenderness  HPI:   69-year-old female presents ED with acute onset of right upper quadrant abdominal tenderness that wraps around the right side of her ribs to the right CVA region.  Patient stated this happened earlier today, she denies any recent injury or cough, she denies having any fever, chills.  Patient reports prior appendectomy, denies any known history of gallstones or pancreatitis, she does report prior history of gastric ulcers.  She denies any chest pain or shortness of breath she notes pain is worse after eating and also feels very bloated.  Denies any hemoptysis hematemesis hematochezia or melanotic stools.  She does note some intermittent transient nausea.    Additional Limitations to History:   External Records Reviewed: I reviewed recent and relevant outside records including   History Obtained From:     Past Medical History: Per HPI  Medications: Reviewed in EMR and with patient  Allergies:  Reviewed in EMR  Past Surgical History:   Social History:     ------------------------------------------------------------------------------------------------------  Physical Exam:  --Vital signs reviewed in nursing triage note, EMR flow sheets, and at patient's bedside  GEN:  A&Ox3, no acute distress, appears comfortable.  Conversational and appropriate.  No confusion or gross mental status changes.  EYES: EOMI, non-injected sclera.  ENT: Moist mucous membranes, no apparent injuries or lesions.   CARDIO: Normal rate and regular rhythm. No murmurs, rubs, or gallops.  2+ equal pulses of the distal extremities.   PULM: Clear to auscultation bilaterally. No rales, rhonchi, or wheezes. Good symmetric chest expansion.  GI: Soft, non-tender, non-distended. No rebound tenderness or guarding.  SKIN: Warm and dry, no rashes or lesions.  MSK: ROM intact the extremities without contractures.   EXT: No  peripheral edema, contusions, or wounds.   NEURO: Cranial nerves II-XII grossly intact. Sensation to light touch intact and equal bilaterally in upper and lower extremities.  Symmetric 5/5 strength in upper and lower extremities.  PSYCH: Appropriate mood and behavior, converses and responds appropriately during exam.  -------------------------------------------------------------------------------------------------------      Differential Diagnoses Considered:   Chronic Medical Conditions Significantly Affecting Care:   Diagnostic testing considered: [PERC, D-Dimer, PECARN, etc.]    - EKG interpreted by myself normal sinus rhythm ventricular rate 73 ND interval 174 normal QRS duration no prolonged QT/QTc no obvious ST elevation, depression, T wave inversion or acute ischemic findings.  - I independently interpreted: [CXR, CT, POCUS, etc. including your interpretation]  - Labs notable for    Escalation of Care: Appropriate for   Social Determinants of Health Significantly Affecting Care: [Homelessness, lacking transportation, uninsured, unable to afford medications]  Prescription Drug Consideration: [Antibiotics, antivirals, pain medications, etc.]  Discussion of Management with Other Providers:  I discussed the patient/results with: [admitting team, consultant, radiologist, social work, EPAT, case management, PT/OT, RT, PCP, etc.]      Bruce Bloom PA-C              Patient History   Past Medical History:   Diagnosis Date    Personal history of other diseases of the digestive system 09/20/2013    History of esophageal reflux     Past Surgical History:   Procedure Laterality Date    OTHER SURGICAL HISTORY  11/11/2022    Appendectomy    OTHER SURGICAL HISTORY  11/11/2022    Biceps tendon rupture repair     Family History   Problem Relation Name Age of Onset    Lung cancer Father      Other (m aunt breast cancer hx) Other       Social History     Tobacco Use    Smoking status: Never    Smokeless tobacco: Never   Vaping  Use    Vaping status: Never Used   Substance Use Topics    Alcohol use: Not Currently     Comment: socially    Drug use: Never       Physical Exam   ED Triage Vitals   Temperature Heart Rate Respirations BP   09/19/24 2216 09/19/24 2216 09/19/24 2216 09/19/24 2216   36.5 °C (97.7 °F) 71 16 156/75      Pulse Ox Temp src Heart Rate Source Patient Position   09/19/24 2216 -- 09/19/24 2300 --   96 %  Monitor       BP Location FiO2 (%)     -- --             Physical Exam      ED Course & MDM   Diagnoses as of 09/23/24 1343   Constipation, unspecified constipation type   Hiatal hernia   Right upper quadrant abdominal pain                 No data recorded     [place] Coma Scale Score: 15 (09/19/24 2253 : Jw Manzo RN)                           Medical Decision Making  69-year-old female with right upper quadrant abdominal tenderness unknown underlying etiology CT findings showed mild to moderate stool burden, small hiatal hernia, enlarged liver no evidence of cholelithiasis or cholecystitis pancreas appears unremarkable there is also some mild left hydronephrosis however no obstructive uropathy her laboratory workup appears benign no leukocytosis normal lactate and lipase she is hemodynamically stable, normotensive, she is not tachycardic O2 sats normal she is afebrile there is low suspicion for acute coronary event or pulmonary emboli advised patient to return to ED if symptoms continue to worsen outpatient follow-up with PCP.        Procedure  Procedures     Bruce Bloom PA-C  09/19/24 7595       Bruce Bloom PA-C  09/23/24 1344

## 2024-09-21 LAB — BACTERIA UR CULT: NORMAL

## 2024-09-24 LAB
ATRIAL RATE: 73 BPM
P AXIS: 48 DEGREES
P OFFSET: 176 MS
P ONSET: 122 MS
PR INTERVAL: 174 MS
Q ONSET: 209 MS
QRS COUNT: 13 BEATS
QRS DURATION: 90 MS
QT INTERVAL: 424 MS
QTC CALCULATION(BAZETT): 467 MS
QTC FREDERICIA: 452 MS
R AXIS: -18 DEGREES
T AXIS: 33 DEGREES
T OFFSET: 421 MS
VENTRICULAR RATE: 73 BPM

## 2024-10-01 ENCOUNTER — APPOINTMENT (OUTPATIENT)
Dept: ALLERGY | Facility: CLINIC | Age: 69
End: 2024-10-01
Payer: MEDICARE

## 2024-10-01 DIAGNOSIS — J30.89 ALLERGIC RHINITIS DUE TO OTHER ALLERGIC TRIGGER, UNSPECIFIED SEASONALITY: ICD-10-CM

## 2024-10-01 PROCEDURE — 95117 IMMUNOTHERAPY INJECTIONS: CPT | Performed by: ALLERGY & IMMUNOLOGY

## 2024-10-08 ENCOUNTER — APPOINTMENT (OUTPATIENT)
Dept: ALLERGY | Facility: CLINIC | Age: 69
End: 2024-10-08
Payer: MEDICARE

## 2024-10-08 DIAGNOSIS — J30.89 ALLERGIC RHINITIS DUE TO OTHER ALLERGIC TRIGGER, UNSPECIFIED SEASONALITY: ICD-10-CM

## 2024-10-08 PROCEDURE — 95117 IMMUNOTHERAPY INJECTIONS: CPT | Performed by: ALLERGY & IMMUNOLOGY

## 2024-10-15 ENCOUNTER — APPOINTMENT (OUTPATIENT)
Dept: ALLERGY | Facility: CLINIC | Age: 69
End: 2024-10-15
Payer: MEDICARE

## 2024-10-15 DIAGNOSIS — J30.89 ALLERGIC RHINITIS DUE TO OTHER ALLERGIC TRIGGER, UNSPECIFIED SEASONALITY: ICD-10-CM

## 2024-10-15 PROCEDURE — 95117 IMMUNOTHERAPY INJECTIONS: CPT | Performed by: ALLERGY & IMMUNOLOGY

## 2024-10-20 DIAGNOSIS — K21.9 GASTROESOPHAGEAL REFLUX DISEASE, UNSPECIFIED WHETHER ESOPHAGITIS PRESENT: ICD-10-CM

## 2024-10-21 RX ORDER — PANTOPRAZOLE SODIUM 40 MG/1
40 TABLET, DELAYED RELEASE ORAL DAILY
Qty: 90 TABLET | Refills: 1 | Status: SHIPPED | OUTPATIENT
Start: 2024-10-21

## 2024-10-22 ENCOUNTER — APPOINTMENT (OUTPATIENT)
Dept: ALLERGY | Facility: CLINIC | Age: 69
End: 2024-10-22
Payer: MEDICARE

## 2024-10-22 DIAGNOSIS — J30.89 ALLERGIC RHINITIS DUE TO OTHER ALLERGIC TRIGGER, UNSPECIFIED SEASONALITY: ICD-10-CM

## 2024-10-22 PROCEDURE — 95117 IMMUNOTHERAPY INJECTIONS: CPT | Performed by: ALLERGY & IMMUNOLOGY

## 2024-10-29 ENCOUNTER — OFFICE VISIT (OUTPATIENT)
Dept: PULMONOLOGY | Facility: CLINIC | Age: 69
End: 2024-10-29
Payer: MEDICARE

## 2024-10-29 VITALS
SYSTOLIC BLOOD PRESSURE: 125 MMHG | WEIGHT: 164.2 LBS | DIASTOLIC BLOOD PRESSURE: 85 MMHG | HEART RATE: 76 BPM | BODY MASS INDEX: 29.09 KG/M2 | OXYGEN SATURATION: 97 %

## 2024-10-29 DIAGNOSIS — J45.30 MILD PERSISTENT ASTHMA, UNSPECIFIED WHETHER COMPLICATED (HHS-HCC): Primary | ICD-10-CM

## 2024-10-29 DIAGNOSIS — J45.30 MILD PERSISTENT ASTHMA, UNSPECIFIED WHETHER COMPLICATED (HHS-HCC): ICD-10-CM

## 2024-10-29 DIAGNOSIS — J30.9 ALLERGIC RHINITIS, UNSPECIFIED SEASONALITY, UNSPECIFIED TRIGGER: ICD-10-CM

## 2024-10-29 PROCEDURE — 1126F AMNT PAIN NOTED NONE PRSNT: CPT | Performed by: INTERNAL MEDICINE

## 2024-10-29 PROCEDURE — 1036F TOBACCO NON-USER: CPT | Performed by: INTERNAL MEDICINE

## 2024-10-29 PROCEDURE — 99214 OFFICE O/P EST MOD 30 MIN: CPT | Performed by: INTERNAL MEDICINE

## 2024-10-29 PROCEDURE — 1159F MED LIST DOCD IN RCRD: CPT | Performed by: INTERNAL MEDICINE

## 2024-10-29 RX ORDER — FLUTICASONE PROPIONATE AND SALMETEROL XINAFOATE 45; 21 UG/1; UG/1
2 AEROSOL, METERED RESPIRATORY (INHALATION)
Qty: 1 G | Refills: 11 | Status: SHIPPED | OUTPATIENT
Start: 2024-10-29 | End: 2024-11-28

## 2024-10-29 RX ORDER — BUDESONIDE AND FORMOTEROL FUMARATE DIHYDRATE 160; 4.5 UG/1; UG/1
2 AEROSOL RESPIRATORY (INHALATION)
Qty: 10.2 G | Refills: 11 | Status: SHIPPED | OUTPATIENT
Start: 2024-10-29 | End: 2025-10-29

## 2024-10-29 RX ORDER — BUDESONIDE AND FORMOTEROL FUMARATE DIHYDRATE 160; 4.5 UG/1; UG/1
2 AEROSOL RESPIRATORY (INHALATION)
Qty: 10.2 G | Refills: 11 | Status: SHIPPED | OUTPATIENT
Start: 2024-10-29 | End: 2024-10-29

## 2024-10-29 RX ORDER — TIRZEPATIDE 2.5 MG/.5ML
INJECTION, SOLUTION SUBCUTANEOUS
COMMUNITY
Start: 2024-10-18

## 2024-10-29 ASSESSMENT — PATIENT HEALTH QUESTIONNAIRE - PHQ9
1. LITTLE INTEREST OR PLEASURE IN DOING THINGS: NOT AT ALL
SUM OF ALL RESPONSES TO PHQ9 QUESTIONS 1 AND 2: 0
2. FEELING DOWN, DEPRESSED OR HOPELESS: NOT AT ALL
1. LITTLE INTEREST OR PLEASURE IN DOING THINGS: NOT AT ALL
2. FEELING DOWN, DEPRESSED OR HOPELESS: NOT AT ALL
SUM OF ALL RESPONSES TO PHQ9 QUESTIONS 1 AND 2: 0

## 2024-10-29 ASSESSMENT — PAIN SCALES - GENERAL: PAINLEVEL_OUTOF10: 0-NO PAIN

## 2024-10-29 ASSESSMENT — COLUMBIA-SUICIDE SEVERITY RATING SCALE - C-SSRS
6. HAVE YOU EVER DONE ANYTHING, STARTED TO DO ANYTHING, OR PREPARED TO DO ANYTHING TO END YOUR LIFE?: NO
2. HAVE YOU ACTUALLY HAD ANY THOUGHTS OF KILLING YOURSELF?: NO
1. IN THE PAST MONTH, HAVE YOU WISHED YOU WERE DEAD OR WISHED YOU COULD GO TO SLEEP AND NOT WAKE UP?: NO
6. HAVE YOU EVER DONE ANYTHING, STARTED TO DO ANYTHING, OR PREPARED TO DO ANYTHING TO END YOUR LIFE?: NO
2. HAVE YOU ACTUALLY HAD ANY THOUGHTS OF KILLING YOURSELF?: NO
1. IN THE PAST MONTH, HAVE YOU WISHED YOU WERE DEAD OR WISHED YOU COULD GO TO SLEEP AND NOT WAKE UP?: NO

## 2024-11-19 ENCOUNTER — APPOINTMENT (OUTPATIENT)
Dept: ALLERGY | Facility: CLINIC | Age: 69
End: 2024-11-19
Payer: MEDICARE

## 2024-11-19 DIAGNOSIS — J30.89 ALLERGIC RHINITIS DUE TO OTHER ALLERGIC TRIGGER, UNSPECIFIED SEASONALITY: ICD-10-CM

## 2024-11-19 PROCEDURE — 95117 IMMUNOTHERAPY INJECTIONS: CPT | Performed by: ALLERGY & IMMUNOLOGY

## 2024-12-02 ENCOUNTER — TELEPHONE (OUTPATIENT)
Dept: PRIMARY CARE | Facility: CLINIC | Age: 69
End: 2024-12-02
Payer: MEDICARE

## 2024-12-02 DIAGNOSIS — J06.9 UPPER RESPIRATORY TRACT INFECTION, UNSPECIFIED TYPE: Primary | ICD-10-CM

## 2024-12-02 RX ORDER — DOXYCYCLINE 100 MG/1
100 CAPSULE ORAL 2 TIMES DAILY
Qty: 14 CAPSULE | Refills: 0 | Status: SHIPPED | OUTPATIENT
Start: 2024-12-02 | End: 2024-12-09

## 2024-12-02 NOTE — TELEPHONE ENCOUNTER
Patient has congestion and cough.  She said she easily gets pneumonia.    Call in Rx?    [facility]

## 2024-12-13 ENCOUNTER — OFFICE VISIT (OUTPATIENT)
Dept: CARDIOLOGY | Facility: HOSPITAL | Age: 69
End: 2024-12-13
Payer: MEDICARE

## 2024-12-13 VITALS
WEIGHT: 162.26 LBS | DIASTOLIC BLOOD PRESSURE: 78 MMHG | OXYGEN SATURATION: 96 % | SYSTOLIC BLOOD PRESSURE: 124 MMHG | HEART RATE: 62 BPM | BODY MASS INDEX: 28.74 KG/M2

## 2024-12-13 DIAGNOSIS — R00.2 PALPITATIONS: ICD-10-CM

## 2024-12-13 LAB
ATRIAL RATE: 65 BPM
P AXIS: 43 DEGREES
P OFFSET: 183 MS
P ONSET: 128 MS
PR INTERVAL: 164 MS
Q ONSET: 210 MS
QRS COUNT: 10 BEATS
QRS DURATION: 88 MS
QT INTERVAL: 440 MS
QTC CALCULATION(BAZETT): 457 MS
QTC FREDERICIA: 451 MS
R AXIS: -20 DEGREES
T AXIS: 31 DEGREES
T OFFSET: 430 MS
VENTRICULAR RATE: 65 BPM

## 2024-12-13 PROCEDURE — 1159F MED LIST DOCD IN RCRD: CPT | Performed by: INTERNAL MEDICINE

## 2024-12-13 PROCEDURE — 99214 OFFICE O/P EST MOD 30 MIN: CPT | Performed by: INTERNAL MEDICINE

## 2024-12-13 PROCEDURE — 93005 ELECTROCARDIOGRAM TRACING: CPT | Performed by: INTERNAL MEDICINE

## 2024-12-13 PROCEDURE — 1036F TOBACCO NON-USER: CPT | Performed by: INTERNAL MEDICINE

## 2024-12-13 RX ORDER — FINASTERIDE 5 MG/1
5 TABLET, FILM COATED ORAL DAILY
COMMUNITY

## 2024-12-13 NOTE — PROGRESS NOTES
Primary Care Physician: Devin Novak DO   Date of Visit: 12/13/2024  2:15 PM EST  Type of Visit: New patient visit    Chief Complaint:  Numerous, see below    HPI  Jenn Antunez 69 y.o. female who presents to follow-up.    Reports that she has had pain palpitations/fluttering sensation in her chest over the last few weeks, occurs fairly regularly, almost daily, particularly in the evenings.  Initially, noticed it was worse with chocolate or caffeine and so she cut back on both significantly, but fluttering sensation returns.  She reports a history of PVCs, though reports this feels different.  Denies any angina, shortness of breath, paroxysmal nocturnal dyspnea, orthopnea, peripheral edema, near-syncope, or syncope.    Review of Systems   12 point review of systems was obtained in detail and is negative other than that noted above or below.     Past Medical/Surgical History:  Jenn has a past medical history of Personal history of other diseases of the digestive system (09/20/2013).    Jenn has a past surgical history that includes Other surgical history (11/11/2022) and Other surgical history (11/11/2022).    Social/Family History:  Jenn reports that she has never smoked. She has never used smokeless tobacco. She reports that she does not currently use alcohol. She reports that she does not use drugs.    Jenn's family history includes Lung cancer in her father; m aunt breast cancer hx in an other family member.    Allergies:  Allergies   Allergen Reactions    Azithromycin Unknown    Erythromycin Other    Nitrofurantoin Macrocrystal Other    Nitrofurantoin Macrocrystalline Unknown     Stomach upset    Nitrofurantoin Monohyd/M-Cryst Unknown    Sulfa (Sulfonamide Antibiotics) Swelling    Sulfur Swelling       Medications:  Current Outpatient Medications   Medication Sig Dispense Refill    acetaminophen (Tylenol) 325 mg tablet Take by mouth every 4 hours if needed.      acyclovir (Zovirax) 400 mg tablet Take  1 tablet (400 mg) by mouth three times daily for 5 days. 15 tablet 11    albuterol (Ventolin HFA) 90 mcg/actuation inhaler Inhale 2 puffs every 4 hours if needed for wheezing or shortness of breath. 36 g 5    albuterol 108 (90 Base) MCG/ACT inhaler Inhale 2 puffs every 6 hours if needed for wheezing.      EPINEPHrine 0.3 mg/0.3 mL injection syringe Inject 0.3 mL (0.3 mg) into the muscle 1 time if needed for anaphylaxis for up to 1 dose. Inject into upper leg. Call 911 after use. 2 each 3    estradiol (Estrace) 0.01 % (0.1 mg/gram) vaginal cream Apply a pea size amount to the vaginal opening three nights a week at bedtime. 34 g 3    finasteride (Proscar) 5 mg tablet Take 1 tablet (5 mg) by mouth once daily. Do not crush, chew, or split.      fluticasone propion-salmeteroL (Advair HFA) 115-21 mcg/actuation inhaler INHALE 2 PUFFS BY MOUTH EVERY 12 HOURS IN THE MORNING AND IN THE EVENING 12 g 11    fluticasone propion-salmeteroL (Advair HFA) 45-21 mcg/actuation inhaler Inhale 2 puffs 2 times a day. 12 g 11    ibuprofen 200 mg tablet Take 1 tablet (200 mg) by mouth every 6 hours.      lactobacillus acidophilus capsule Take by mouth.      nystatin (Mycostatin) 100,000 unit/gram powder Apply 1 Application topically 3 times a day. 15 g 0    pantoprazole (ProtoNix) 40 mg EC tablet TAKE 1 TABLET BY MOUTH EVERY DAY 90 tablet 1    tetrahydrozoline HCl/zinc sulf (ALLERGY RELIEF EYE DROPS OPHT) Administer 1 drop into both eyes once daily as needed.      tretinoin (Retin-A) 0.05 % cream APPLY PEA SIZED AMOUNT TO FACE EVERY NIGHT      urea 40 % lotion APPLY TO AFFECTED AREAS EVERY DAY      budesonide-formoteroL (Symbicort) 160-4.5 mcg/actuation inhaler Inhale 2 puffs 2 times a day. Rinse mouth with water after use to reduce aftertaste and incidence of candidiasis. Do not swallow. (Patient not taking: Reported on 12/13/2024) 10.2 g 11    fluticasone propion-salmeteroL (Advair HFA) 45-21 mcg/actuation inhaler Inhale 2 puffs 2 times a  day. 1 g 11     No current facility-administered medications for this visit.       Objective:   Vitals:    12/13/24 1428   BP: 124/78   Pulse: 62   SpO2: 96%       S1-S2 normal, regular rate and rhythm, no murmurs, rubs, or gallops, no carotid bruit  Clear to auscultation bilaterally    Labs and Imaging:      Latest Ref Rng & Units 7/10/2022    11:39 PM 9/30/2022     8:10 AM 1/31/2023     4:25 PM 3/21/2023     6:00 AM 5/22/2023     8:11 AM 3/6/2024    11:37 AM 9/19/2024    10:43 PM   Electrolytes   Na 136 - 145 mmol/L 141  142  140  139  140  142  141    K 3.5 - 5.3 mmol/L 4.1  4.9  4.4  4.2  4.2  4.2  3.7    Cl 98 - 107 mmol/L 103  105  101  102  104  104  106    CO2 21 - 32 mmol/L 28  24  30  23  28  30  25    Cr 0.50 - 1.05 mg/dL 0.76  0.6  0.82  0.6  0.56  0.63  0.60    Ca 8.6 - 10.3 mg/dL 9.7  9.0  9.7  9.5  9.3  9.3  8.7          Latest Ref Rng & Units 7/9/2022     6:45 AM 7/10/2022    11:39 PM 9/30/2022     8:10 AM 5/13/2023    11:10 AM 5/22/2023     8:11 AM 10/18/2023     8:40 AM 9/19/2024    10:43 PM   CBC   Hb 12.0 - 16.0 g/dL 12.1  12.3  13.0  13.1  12.6  12.6  12.5    Hct 36.0 - 46.0 % 36.3  37.0  40.0  39.6  40.1  39.1  37.5    MCV 80 - 100 fL 90  90  92.8  93.2  95  94  90    RDW 11.5 - 14.5 % 13.8  14.0    14.6  14.0  13.6          Latest Ref Rng & Units 7/9/2022     6:45 AM 7/10/2022    11:39 PM 3/21/2023     6:00 AM 5/22/2023     8:11 AM 10/18/2023     8:40 AM 3/6/2024    11:37 AM 9/19/2024    10:43 PM   Lipids   Chol 0 - 199 mg/dL   329   181      HDL mg/dL   64   45.1      LDL <=99 mg/dL   248   108      Trig 0 - 149 mg/dL   86   139      ALT 7 - 45 U/L 51  46  26  22   60  23    AST 9 - 39 U/L 30  27  26  18   41  17          Latest Ref Rng & Units 6/16/2018    11:00 AM 3/11/2020     7:40 PM 7/29/2021     8:47 AM 4/5/2022     4:58 PM 5/13/2023    11:10 AM 10/18/2023     8:40 AM   Thyroid   TSH 0.44 - 3.98 mIU/L 1.76  2.96  1.98  1.06  1.57  1.73           No data to display                 Lab  Results   Component Value Date    HGBA1C 5.7 (H) 10/18/2023        The 10-year ASCVD risk score (Nuria DURAN, et al., 2019) is: 9%    Values used to calculate the score:      Age: 69 years      Sex: Female      Is Non- : No      Diabetic: No      Tobacco smoker: No      Systolic Blood Pressure: 124 mmHg      Is BP treated: No      HDL Cholesterol: 45.1 mg/dL      Total Cholesterol: 181 mg/dL     Echocardiogram: No results found for this or any previous visit.     Echocardiogram:   PHYSICIAN INTERPRETATION:  Left Ventricle: The left ventricular systolic function is normal, with an estimated ejection fraction of 65-70%. There are no regional wall motion abnormalities. The left ventricular cavity size is normal. There is mild left ventricular hypertrophy involving the basal wall and septal wall. Spectral Doppler shows an impaired relaxation pattern of left ventricular diastolic filling.  Left Atrium: The left atrium is upper limits of normal in size.  Right Ventricle: The right ventricle is normal in size. There is normal right ventricular global systolic function.  Right Atrium: The right atrium is normal in size.  Aortic Valve: The aortic valve appears structurally normal. There is no evidence of aortic valve regurgitation. The peak instantaneous gradient of the aortic valve is 12.1 mmHg. The mean gradient of the aortic valve is 7.0 mmHg.  Mitral Valve: The mitral valve is normal in structure. There is trace to mild mitral valve regurgitation.  Tricuspid Valve: The tricuspid valve is structurally normal. There is mild tricuspid regurgitation.  Pulmonic Valve: The pulmonic valve is structurally normal. There is physiologic pulmonic valve regurgitation.  Pericardium: There is a trivial pericardial effusion posterior to the left ventricle.  Aorta: The aortic root is normal.  Pulmonary Artery: The tricuspid regurgitant velocity is 2.68 m/s, and with an estimated right atrial pressure of 3 mmHg, the  estimated pulmonary artery pressure is normal with the RVSP at 28.4 mmHg.  Pulmonary Veins: The pulmonary veins appear normal and return normally to the left atrium.  Systemic Veins: The inferior vena cava appears to be of normal size.      CONCLUSIONS:  1. The left ventricular systolic function is normal with a 65-70% estimated ejection fraction.  2. Spectral Doppler shows an impaired relaxation pattern of left ventricular diastolic filling.      Stress Testing: No results found for this or any previous visit from the past 1825 days.    Cardiac Catheterization: No results found for this or any previous visit from the past 1825 days.    Cardiac Scoring: No results found for this or any previous visit from the past 1825 days.    AAA : No results found for this or any previous visit from the past 1825 days.    OTHER: No results found for this or any previous visit from the past 1825 days.        Assessment/Plan:   1. Palpitations  ECG 12 lead (Clinic Performed)    TSH with reflex to Free T4 if abnormal    Holter Or Event Cardiac Monitor           Jenn Antunez 69 y.o. female who presents to establish care:    1.  Palpitations    Check Holter monitor.  Check TSH with reflex T4.  Echocardiogram demonstrates structurally normal heart.    Return to clinic after testing.   ____________________________________________________________  Luis Goodson MD

## 2024-12-16 ENCOUNTER — APPOINTMENT (OUTPATIENT)
Dept: ALLERGY | Facility: CLINIC | Age: 69
End: 2024-12-16
Payer: MEDICARE

## 2024-12-16 DIAGNOSIS — J30.89 ALLERGIC RHINITIS DUE TO OTHER ALLERGIC TRIGGER, UNSPECIFIED SEASONALITY: ICD-10-CM

## 2024-12-16 PROCEDURE — 95117 IMMUNOTHERAPY INJECTIONS: CPT | Performed by: ALLERGY & IMMUNOLOGY

## 2024-12-17 ENCOUNTER — LAB (OUTPATIENT)
Dept: LAB | Facility: LAB | Age: 69
End: 2024-12-17
Payer: MEDICARE

## 2024-12-17 ENCOUNTER — HOSPITAL ENCOUNTER (OUTPATIENT)
Dept: CARDIOLOGY | Facility: HOSPITAL | Age: 69
Discharge: HOME | End: 2024-12-17
Payer: MEDICARE

## 2024-12-17 DIAGNOSIS — R00.2 PALPITATIONS: ICD-10-CM

## 2024-12-17 LAB — TSH SERPL-ACNC: 1 MIU/L (ref 0.44–3.98)

## 2024-12-17 PROCEDURE — 84443 ASSAY THYROID STIM HORMONE: CPT

## 2024-12-17 PROCEDURE — 36415 COLL VENOUS BLD VENIPUNCTURE: CPT

## 2024-12-17 PROCEDURE — 93246 EXT ECG>7D<15D RECORDING: CPT

## 2025-01-13 ENCOUNTER — APPOINTMENT (OUTPATIENT)
Dept: ALLERGY | Facility: CLINIC | Age: 70
End: 2025-01-13
Payer: MEDICARE

## 2025-01-13 DIAGNOSIS — J30.89 ALLERGIC RHINITIS DUE TO OTHER ALLERGIC TRIGGER, UNSPECIFIED SEASONALITY: ICD-10-CM

## 2025-01-13 PROCEDURE — 95117 IMMUNOTHERAPY INJECTIONS: CPT | Performed by: ALLERGY & IMMUNOLOGY

## 2025-01-21 DIAGNOSIS — K21.9 GASTROESOPHAGEAL REFLUX DISEASE, UNSPECIFIED WHETHER ESOPHAGITIS PRESENT: ICD-10-CM

## 2025-01-22 RX ORDER — PANTOPRAZOLE SODIUM 40 MG/1
40 TABLET, DELAYED RELEASE ORAL DAILY
Qty: 90 TABLET | Refills: 0 | Status: SHIPPED | OUTPATIENT
Start: 2025-01-22

## 2025-01-31 ENCOUNTER — OFFICE VISIT (OUTPATIENT)
Dept: CARDIOLOGY | Facility: HOSPITAL | Age: 70
End: 2025-01-31
Payer: MEDICARE

## 2025-01-31 VITALS
WEIGHT: 167.11 LBS | DIASTOLIC BLOOD PRESSURE: 80 MMHG | BODY MASS INDEX: 29.6 KG/M2 | HEART RATE: 80 BPM | OXYGEN SATURATION: 97 % | SYSTOLIC BLOOD PRESSURE: 120 MMHG

## 2025-01-31 DIAGNOSIS — G47.33 OBSTRUCTIVE SLEEP APNEA SYNDROME: Primary | ICD-10-CM

## 2025-01-31 PROCEDURE — 99213 OFFICE O/P EST LOW 20 MIN: CPT | Performed by: INTERNAL MEDICINE

## 2025-01-31 PROCEDURE — 1159F MED LIST DOCD IN RCRD: CPT | Performed by: INTERNAL MEDICINE

## 2025-02-03 ENCOUNTER — TELEPHONE (OUTPATIENT)
Dept: PRIMARY CARE | Facility: CLINIC | Age: 70
End: 2025-02-03
Payer: MEDICARE

## 2025-02-03 NOTE — TELEPHONE ENCOUNTER
Patient has flu and has been using her inhaler. Not started getting a thick coating on her tongue was wondering what medication she can get to help this go away.

## 2025-02-06 ENCOUNTER — APPOINTMENT (OUTPATIENT)
Dept: RADIOLOGY | Facility: HOSPITAL | Age: 70
End: 2025-02-06
Payer: MEDICARE

## 2025-02-06 ENCOUNTER — HOSPITAL ENCOUNTER (INPATIENT)
Facility: HOSPITAL | Age: 70
LOS: 1 days | Discharge: HOME | End: 2025-02-08
Attending: STUDENT IN AN ORGANIZED HEALTH CARE EDUCATION/TRAINING PROGRAM | Admitting: INTERNAL MEDICINE
Payer: MEDICARE

## 2025-02-06 ENCOUNTER — APPOINTMENT (OUTPATIENT)
Dept: CARDIOLOGY | Facility: HOSPITAL | Age: 70
End: 2025-02-06
Payer: MEDICARE

## 2025-02-06 DIAGNOSIS — J45.21 MILD INTERMITTENT ASTHMA WITH EXACERBATION (HHS-HCC): Primary | ICD-10-CM

## 2025-02-06 DIAGNOSIS — J10.1 INFLUENZA A: ICD-10-CM

## 2025-02-06 PROBLEM — J11.1 FLU: Status: ACTIVE | Noted: 2025-02-06

## 2025-02-06 LAB
ALBUMIN SERPL BCP-MCNC: 4.2 G/DL (ref 3.4–5)
ALP SERPL-CCNC: 61 U/L (ref 33–136)
ALT SERPL W P-5'-P-CCNC: 43 U/L (ref 7–45)
ANION GAP SERPL CALC-SCNC: 19 MMOL/L (ref 10–20)
AST SERPL W P-5'-P-CCNC: 31 U/L (ref 9–39)
BILIRUB SERPL-MCNC: 0.3 MG/DL (ref 0–1.2)
BUN SERPL-MCNC: 14 MG/DL (ref 6–23)
CALCIUM SERPL-MCNC: 9.1 MG/DL (ref 8.6–10.3)
CHLORIDE SERPL-SCNC: 103 MMOL/L (ref 98–107)
CO2 SERPL-SCNC: 23 MMOL/L (ref 21–32)
CREAT SERPL-MCNC: 0.77 MG/DL (ref 0.5–1.05)
EGFRCR SERPLBLD CKD-EPI 2021: 83 ML/MIN/1.73M*2
ERYTHROCYTE [DISTWIDTH] IN BLOOD BY AUTOMATED COUNT: 14 % (ref 11.5–14.5)
FLUAV RNA RESP QL NAA+PROBE: DETECTED
FLUBV RNA RESP QL NAA+PROBE: NOT DETECTED
GLUCOSE SERPL-MCNC: 107 MG/DL (ref 74–99)
HCT VFR BLD AUTO: 39 % (ref 36–46)
HGB BLD-MCNC: 12.9 G/DL (ref 12–16)
MCH RBC QN AUTO: 30.1 PG (ref 26–34)
MCHC RBC AUTO-ENTMCNC: 33.1 G/DL (ref 32–36)
MCV RBC AUTO: 91 FL (ref 80–100)
NRBC BLD-RTO: 0 /100 WBCS (ref 0–0)
PLATELET # BLD AUTO: 246 X10*3/UL (ref 150–450)
POTASSIUM SERPL-SCNC: 3.5 MMOL/L (ref 3.5–5.3)
PROT SERPL-MCNC: 6.8 G/DL (ref 6.4–8.2)
RBC # BLD AUTO: 4.29 X10*6/UL (ref 4–5.2)
RSV RNA RESP QL NAA+PROBE: NOT DETECTED
SARS-COV-2 RNA RESP QL NAA+PROBE: NOT DETECTED
SODIUM SERPL-SCNC: 141 MMOL/L (ref 136–145)
WBC # BLD AUTO: 5.2 X10*3/UL (ref 4.4–11.3)

## 2025-02-06 PROCEDURE — 2500000001 HC RX 250 WO HCPCS SELF ADMINISTERED DRUGS (ALT 637 FOR MEDICARE OP): Performed by: NURSE PRACTITIONER

## 2025-02-06 PROCEDURE — 2500000002 HC RX 250 W HCPCS SELF ADMINISTERED DRUGS (ALT 637 FOR MEDICARE OP, ALT 636 FOR OP/ED): Performed by: STUDENT IN AN ORGANIZED HEALTH CARE EDUCATION/TRAINING PROGRAM

## 2025-02-06 PROCEDURE — 87637 SARSCOV2&INF A&B&RSV AMP PRB: CPT | Performed by: STUDENT IN AN ORGANIZED HEALTH CARE EDUCATION/TRAINING PROGRAM

## 2025-02-06 PROCEDURE — 2500000002 HC RX 250 W HCPCS SELF ADMINISTERED DRUGS (ALT 637 FOR MEDICARE OP, ALT 636 FOR OP/ED): Performed by: INTERNAL MEDICINE

## 2025-02-06 PROCEDURE — 94640 AIRWAY INHALATION TREATMENT: CPT

## 2025-02-06 PROCEDURE — 94760 N-INVAS EAR/PLS OXIMETRY 1: CPT

## 2025-02-06 PROCEDURE — 2500000004 HC RX 250 GENERAL PHARMACY W/ HCPCS (ALT 636 FOR OP/ED): Performed by: NURSE PRACTITIONER

## 2025-02-06 PROCEDURE — G0378 HOSPITAL OBSERVATION PER HR: HCPCS

## 2025-02-06 PROCEDURE — 2500000002 HC RX 250 W HCPCS SELF ADMINISTERED DRUGS (ALT 637 FOR MEDICARE OP, ALT 636 FOR OP/ED): Performed by: NURSE PRACTITIONER

## 2025-02-06 PROCEDURE — 71046 X-RAY EXAM CHEST 2 VIEWS: CPT | Performed by: STUDENT IN AN ORGANIZED HEALTH CARE EDUCATION/TRAINING PROGRAM

## 2025-02-06 PROCEDURE — 2500000001 HC RX 250 WO HCPCS SELF ADMINISTERED DRUGS (ALT 637 FOR MEDICARE OP): Performed by: INTERNAL MEDICINE

## 2025-02-06 PROCEDURE — 85027 COMPLETE CBC AUTOMATED: CPT | Performed by: STUDENT IN AN ORGANIZED HEALTH CARE EDUCATION/TRAINING PROGRAM

## 2025-02-06 PROCEDURE — 99285 EMERGENCY DEPT VISIT HI MDM: CPT | Mod: 25 | Performed by: STUDENT IN AN ORGANIZED HEALTH CARE EDUCATION/TRAINING PROGRAM

## 2025-02-06 PROCEDURE — 71046 X-RAY EXAM CHEST 2 VIEWS: CPT

## 2025-02-06 PROCEDURE — 96372 THER/PROPH/DIAG INJ SC/IM: CPT | Performed by: NURSE PRACTITIONER

## 2025-02-06 PROCEDURE — 80053 COMPREHEN METABOLIC PANEL: CPT | Performed by: STUDENT IN AN ORGANIZED HEALTH CARE EDUCATION/TRAINING PROGRAM

## 2025-02-06 PROCEDURE — 93005 ELECTROCARDIOGRAM TRACING: CPT

## 2025-02-06 PROCEDURE — 36415 COLL VENOUS BLD VENIPUNCTURE: CPT | Performed by: STUDENT IN AN ORGANIZED HEALTH CARE EDUCATION/TRAINING PROGRAM

## 2025-02-06 PROCEDURE — 99222 1ST HOSP IP/OBS MODERATE 55: CPT | Performed by: NURSE PRACTITIONER

## 2025-02-06 PROCEDURE — 2500000004 HC RX 250 GENERAL PHARMACY W/ HCPCS (ALT 636 FOR OP/ED): Performed by: STUDENT IN AN ORGANIZED HEALTH CARE EDUCATION/TRAINING PROGRAM

## 2025-02-06 RX ORDER — ENOXAPARIN SODIUM 100 MG/ML
40 INJECTION SUBCUTANEOUS EVERY 24 HOURS
Status: DISCONTINUED | OUTPATIENT
Start: 2025-02-06 | End: 2025-02-08 | Stop reason: HOSPADM

## 2025-02-06 RX ORDER — FLUTICASONE FUROATE AND VILANTEROL 200; 25 UG/1; UG/1
1 POWDER RESPIRATORY (INHALATION)
Status: DISCONTINUED | OUTPATIENT
Start: 2025-02-06 | End: 2025-02-08 | Stop reason: HOSPADM

## 2025-02-06 RX ORDER — POTASSIUM CHLORIDE 20 MEQ/1
20 TABLET, EXTENDED RELEASE ORAL ONCE
Status: COMPLETED | OUTPATIENT
Start: 2025-02-06 | End: 2025-02-06

## 2025-02-06 RX ORDER — ACETAMINOPHEN 160 MG/5ML
650 SOLUTION ORAL EVERY 4 HOURS PRN
Status: DISCONTINUED | OUTPATIENT
Start: 2025-02-06 | End: 2025-02-08 | Stop reason: HOSPADM

## 2025-02-06 RX ORDER — ALBUTEROL SULFATE 0.83 MG/ML
2.5 SOLUTION RESPIRATORY (INHALATION) EVERY 4 HOURS PRN
Status: DISCONTINUED | OUTPATIENT
Start: 2025-02-06 | End: 2025-02-06

## 2025-02-06 RX ORDER — ACETAMINOPHEN 650 MG/1
650 SUPPOSITORY RECTAL EVERY 4 HOURS PRN
Status: DISCONTINUED | OUTPATIENT
Start: 2025-02-06 | End: 2025-02-08 | Stop reason: HOSPADM

## 2025-02-06 RX ORDER — CLOTRIMAZOLE 10 MG/1
10 LOZENGE ORAL 4 TIMES DAILY
COMMUNITY

## 2025-02-06 RX ORDER — CLOTRIMAZOLE 10 MG/1
10 LOZENGE ORAL 4 TIMES DAILY
Status: DISCONTINUED | OUTPATIENT
Start: 2025-02-07 | End: 2025-02-08 | Stop reason: HOSPADM

## 2025-02-06 RX ORDER — MAGNESIUM SULFATE 1 G/100ML
1 INJECTION INTRAVENOUS ONCE
Status: COMPLETED | OUTPATIENT
Start: 2025-02-06 | End: 2025-02-06

## 2025-02-06 RX ORDER — IPRATROPIUM BROMIDE AND ALBUTEROL SULFATE 2.5; .5 MG/3ML; MG/3ML
3 SOLUTION RESPIRATORY (INHALATION)
Status: DISCONTINUED | OUTPATIENT
Start: 2025-02-06 | End: 2025-02-08 | Stop reason: HOSPADM

## 2025-02-06 RX ORDER — TALC
3 POWDER (GRAM) TOPICAL NIGHTLY PRN
Status: DISCONTINUED | OUTPATIENT
Start: 2025-02-06 | End: 2025-02-08 | Stop reason: HOSPADM

## 2025-02-06 RX ORDER — DOCUSATE SODIUM 100 MG/1
100 CAPSULE, LIQUID FILLED ORAL 2 TIMES DAILY
Status: DISCONTINUED | OUTPATIENT
Start: 2025-02-06 | End: 2025-02-08 | Stop reason: HOSPADM

## 2025-02-06 RX ORDER — ALBUTEROL SULFATE 0.83 MG/ML
2.5 SOLUTION RESPIRATORY (INHALATION) EVERY 2 HOUR PRN
Status: DISCONTINUED | OUTPATIENT
Start: 2025-02-06 | End: 2025-02-08 | Stop reason: HOSPADM

## 2025-02-06 RX ORDER — ALBUTEROL SULFATE 0.83 MG/ML
2.5 SOLUTION RESPIRATORY (INHALATION) ONCE
Status: COMPLETED | OUTPATIENT
Start: 2025-02-06 | End: 2025-02-06

## 2025-02-06 RX ORDER — PANTOPRAZOLE SODIUM 40 MG/1
40 TABLET, DELAYED RELEASE ORAL DAILY
Status: DISCONTINUED | OUTPATIENT
Start: 2025-02-06 | End: 2025-02-08 | Stop reason: HOSPADM

## 2025-02-06 RX ORDER — OSELTAMIVIR PHOSPHATE 75 MG/1
75 CAPSULE ORAL 2 TIMES DAILY
Status: DISCONTINUED | OUTPATIENT
Start: 2025-02-06 | End: 2025-02-08 | Stop reason: HOSPADM

## 2025-02-06 RX ORDER — ONDANSETRON HYDROCHLORIDE 2 MG/ML
4 INJECTION, SOLUTION INTRAVENOUS EVERY 8 HOURS PRN
Status: DISCONTINUED | OUTPATIENT
Start: 2025-02-06 | End: 2025-02-08 | Stop reason: HOSPADM

## 2025-02-06 RX ORDER — GUAIFENESIN 600 MG/1
1200 TABLET, EXTENDED RELEASE ORAL DAILY
Status: DISCONTINUED | OUTPATIENT
Start: 2025-02-06 | End: 2025-02-08 | Stop reason: HOSPADM

## 2025-02-06 RX ORDER — OSELTAMIVIR PHOSPHATE 75 MG/1
75 CAPSULE ORAL 2 TIMES DAILY
Status: DISCONTINUED | OUTPATIENT
Start: 2025-02-06 | End: 2025-02-06

## 2025-02-06 RX ORDER — IPRATROPIUM BROMIDE AND ALBUTEROL SULFATE 2.5; .5 MG/3ML; MG/3ML
3 SOLUTION RESPIRATORY (INHALATION) EVERY 20 MIN
Status: COMPLETED | OUTPATIENT
Start: 2025-02-06 | End: 2025-02-06

## 2025-02-06 RX ORDER — PREDNISONE 20 MG/1
40 TABLET ORAL DAILY
Status: DISCONTINUED | OUTPATIENT
Start: 2025-02-07 | End: 2025-02-08 | Stop reason: HOSPADM

## 2025-02-06 RX ORDER — ACETAMINOPHEN 325 MG/1
650 TABLET ORAL EVERY 4 HOURS PRN
Status: DISCONTINUED | OUTPATIENT
Start: 2025-02-06 | End: 2025-02-08 | Stop reason: HOSPADM

## 2025-02-06 RX ORDER — PREDNISONE 20 MG/1
40 TABLET ORAL ONCE
Status: COMPLETED | OUTPATIENT
Start: 2025-02-06 | End: 2025-02-06

## 2025-02-06 RX ORDER — IBUPROFEN 200 MG
400 TABLET ORAL EVERY 6 HOURS PRN
Status: DISCONTINUED | OUTPATIENT
Start: 2025-02-06 | End: 2025-02-08 | Stop reason: HOSPADM

## 2025-02-06 RX ORDER — NYSTATIN 100000 [USP'U]/ML
4 SUSPENSION ORAL 4 TIMES DAILY
Status: DISCONTINUED | OUTPATIENT
Start: 2025-02-06 | End: 2025-02-07

## 2025-02-06 RX ORDER — ONDANSETRON 4 MG/1
4 TABLET, FILM COATED ORAL EVERY 8 HOURS PRN
Status: DISCONTINUED | OUTPATIENT
Start: 2025-02-06 | End: 2025-02-08 | Stop reason: HOSPADM

## 2025-02-06 RX ORDER — IPRATROPIUM BROMIDE AND ALBUTEROL SULFATE 2.5; .5 MG/3ML; MG/3ML
3 SOLUTION RESPIRATORY (INHALATION) 3 TIMES DAILY
Status: DISCONTINUED | OUTPATIENT
Start: 2025-02-06 | End: 2025-02-06

## 2025-02-06 RX ADMIN — METHYLPREDNISOLONE SODIUM SUCCINATE 40 MG: 125 INJECTION, POWDER, FOR SOLUTION INTRAMUSCULAR; INTRAVENOUS at 08:02

## 2025-02-06 RX ADMIN — METHYLPREDNISOLONE SODIUM SUCCINATE 40 MG: 125 INJECTION, POWDER, FOR SOLUTION INTRAMUSCULAR; INTRAVENOUS at 22:38

## 2025-02-06 RX ADMIN — NYSTATIN 400000 UNITS: 100000 SUSPENSION ORAL at 16:29

## 2025-02-06 RX ADMIN — FLUTICASONE FUROATE AND VILANTEROL TRIFENATATE 1 PUFF: 200; 25 POWDER RESPIRATORY (INHALATION) at 15:09

## 2025-02-06 RX ADMIN — PREDNISONE 40 MG: 20 TABLET ORAL at 01:28

## 2025-02-06 RX ADMIN — DOCUSATE SODIUM 100 MG: 100 CAPSULE, LIQUID FILLED ORAL at 08:02

## 2025-02-06 RX ADMIN — ENOXAPARIN SODIUM 40 MG: 40 INJECTION SUBCUTANEOUS at 08:02

## 2025-02-06 RX ADMIN — NYSTATIN 400000 UNITS: 100000 SUSPENSION ORAL at 13:35

## 2025-02-06 RX ADMIN — IPRATROPIUM BROMIDE AND ALBUTEROL SULFATE 3 ML: 2.5; .5 SOLUTION RESPIRATORY (INHALATION) at 01:29

## 2025-02-06 RX ADMIN — MAGNESIUM SULFATE IN DEXTROSE 1 G: 10 INJECTION, SOLUTION INTRAVENOUS at 06:38

## 2025-02-06 RX ADMIN — IPRATROPIUM BROMIDE AND ALBUTEROL SULFATE 3 ML: 2.5; .5 SOLUTION RESPIRATORY (INHALATION) at 19:43

## 2025-02-06 RX ADMIN — IBUPROFEN 400 MG: 400 TABLET, FILM COATED ORAL at 14:00

## 2025-02-06 RX ADMIN — METHYLPREDNISOLONE SODIUM SUCCINATE 40 MG: 125 INJECTION, POWDER, FOR SOLUTION INTRAMUSCULAR; INTRAVENOUS at 15:10

## 2025-02-06 RX ADMIN — IPRATROPIUM BROMIDE AND ALBUTEROL SULFATE 3 ML: 2.5; .5 SOLUTION RESPIRATORY (INHALATION) at 15:11

## 2025-02-06 RX ADMIN — OSELTAMAVIR PHOSPHATE 75 MG: 75 CAPSULE ORAL at 20:08

## 2025-02-06 RX ADMIN — IPRATROPIUM BROMIDE AND ALBUTEROL SULFATE 3 ML: 2.5; .5 SOLUTION RESPIRATORY (INHALATION) at 01:28

## 2025-02-06 RX ADMIN — ALBUTEROL SULFATE 2.5 MG: 2.5 SOLUTION RESPIRATORY (INHALATION) at 08:20

## 2025-02-06 RX ADMIN — ALBUTEROL SULFATE 2.5 MG: 2.5 SOLUTION RESPIRATORY (INHALATION) at 03:30

## 2025-02-06 RX ADMIN — PANTOPRAZOLE SODIUM 40 MG: 40 TABLET, DELAYED RELEASE ORAL at 08:02

## 2025-02-06 RX ADMIN — IPRATROPIUM BROMIDE AND ALBUTEROL SULFATE 3 ML: 2.5; .5 SOLUTION RESPIRATORY (INHALATION) at 01:30

## 2025-02-06 RX ADMIN — IPRATROPIUM BROMIDE AND ALBUTEROL SULFATE 3 ML: 2.5; .5 SOLUTION RESPIRATORY (INHALATION) at 08:37

## 2025-02-06 RX ADMIN — POTASSIUM CHLORIDE 20 MEQ: 1500 TABLET, EXTENDED RELEASE ORAL at 06:47

## 2025-02-06 RX ADMIN — OSELTAMAVIR PHOSPHATE 75 MG: 75 CAPSULE ORAL at 05:45

## 2025-02-06 RX ADMIN — NYSTATIN 400000 UNITS: 100000 SUSPENSION ORAL at 20:08

## 2025-02-06 RX ADMIN — DOCUSATE SODIUM 100 MG: 100 CAPSULE, LIQUID FILLED ORAL at 20:08

## 2025-02-06 SDOH — SOCIAL STABILITY: SOCIAL INSECURITY: WERE YOU ABLE TO COMPLETE ALL THE BEHAVIORAL HEALTH SCREENINGS?: YES

## 2025-02-06 SDOH — HEALTH STABILITY: MENTAL HEALTH: HOW OFTEN DO YOU HAVE A DRINK CONTAINING ALCOHOL?: MONTHLY OR LESS

## 2025-02-06 SDOH — ECONOMIC STABILITY: FOOD INSECURITY: WITHIN THE PAST 12 MONTHS, THE FOOD YOU BOUGHT JUST DIDN'T LAST AND YOU DIDN'T HAVE MONEY TO GET MORE.: NEVER TRUE

## 2025-02-06 SDOH — ECONOMIC STABILITY: HOUSING INSECURITY: AT ANY TIME IN THE PAST 12 MONTHS, WERE YOU HOMELESS OR LIVING IN A SHELTER (INCLUDING NOW)?: NO

## 2025-02-06 SDOH — SOCIAL STABILITY: SOCIAL INSECURITY
WITHIN THE LAST YEAR, HAVE YOU BEEN KICKED, HIT, SLAPPED, OR OTHERWISE PHYSICALLY HURT BY YOUR PARTNER OR EX-PARTNER?: NO

## 2025-02-06 SDOH — SOCIAL STABILITY: SOCIAL INSECURITY
WITHIN THE LAST YEAR, HAVE YOU BEEN RAPED OR FORCED TO HAVE ANY KIND OF SEXUAL ACTIVITY BY YOUR PARTNER OR EX-PARTNER?: NO

## 2025-02-06 SDOH — SOCIAL STABILITY: SOCIAL INSECURITY: HAS ANYONE EVER THREATENED TO HURT YOUR FAMILY OR YOUR PETS?: NO

## 2025-02-06 SDOH — ECONOMIC STABILITY: HOUSING INSECURITY: IN THE LAST 12 MONTHS, WAS THERE A TIME WHEN YOU WERE NOT ABLE TO PAY THE MORTGAGE OR RENT ON TIME?: NO

## 2025-02-06 SDOH — SOCIAL STABILITY: SOCIAL INSECURITY: WITHIN THE LAST YEAR, HAVE YOU BEEN HUMILIATED OR EMOTIONALLY ABUSED IN OTHER WAYS BY YOUR PARTNER OR EX-PARTNER?: NO

## 2025-02-06 SDOH — SOCIAL STABILITY: SOCIAL INSECURITY: ABUSE: ADULT

## 2025-02-06 SDOH — SOCIAL STABILITY: SOCIAL INSECURITY: WITHIN THE LAST YEAR, HAVE YOU BEEN AFRAID OF YOUR PARTNER OR EX-PARTNER?: NO

## 2025-02-06 SDOH — SOCIAL STABILITY: SOCIAL INSECURITY: HAVE YOU HAD ANY THOUGHTS OF HARMING ANYONE ELSE?: NO

## 2025-02-06 SDOH — HEALTH STABILITY: MENTAL HEALTH: HOW MANY DRINKS CONTAINING ALCOHOL DO YOU HAVE ON A TYPICAL DAY WHEN YOU ARE DRINKING?: 1 OR 2

## 2025-02-06 SDOH — SOCIAL STABILITY: SOCIAL INSECURITY: DO YOU FEEL UNSAFE GOING BACK TO THE PLACE WHERE YOU ARE LIVING?: NO

## 2025-02-06 SDOH — HEALTH STABILITY: MENTAL HEALTH: HOW OFTEN DO YOU HAVE SIX OR MORE DRINKS ON ONE OCCASION?: NEVER

## 2025-02-06 SDOH — ECONOMIC STABILITY: INCOME INSECURITY: IN THE PAST 12 MONTHS HAS THE ELECTRIC, GAS, OIL, OR WATER COMPANY THREATENED TO SHUT OFF SERVICES IN YOUR HOME?: NO

## 2025-02-06 SDOH — ECONOMIC STABILITY: FOOD INSECURITY: WITHIN THE PAST 12 MONTHS, YOU WORRIED THAT YOUR FOOD WOULD RUN OUT BEFORE YOU GOT THE MONEY TO BUY MORE.: NEVER TRUE

## 2025-02-06 SDOH — ECONOMIC STABILITY: FOOD INSECURITY: HOW HARD IS IT FOR YOU TO PAY FOR THE VERY BASICS LIKE FOOD, HOUSING, MEDICAL CARE, AND HEATING?: NOT HARD AT ALL

## 2025-02-06 SDOH — ECONOMIC STABILITY: TRANSPORTATION INSECURITY: IN THE PAST 12 MONTHS, HAS LACK OF TRANSPORTATION KEPT YOU FROM MEDICAL APPOINTMENTS OR FROM GETTING MEDICATIONS?: NO

## 2025-02-06 SDOH — ECONOMIC STABILITY: HOUSING INSECURITY: IN THE PAST 12 MONTHS, HOW MANY TIMES HAVE YOU MOVED WHERE YOU WERE LIVING?: 0

## 2025-02-06 SDOH — SOCIAL STABILITY: SOCIAL INSECURITY: DO YOU FEEL ANYONE HAS EXPLOITED OR TAKEN ADVANTAGE OF YOU FINANCIALLY OR OF YOUR PERSONAL PROPERTY?: NO

## 2025-02-06 SDOH — SOCIAL STABILITY: SOCIAL INSECURITY: ARE YOU OR HAVE YOU BEEN THREATENED OR ABUSED PHYSICALLY, EMOTIONALLY, OR SEXUALLY BY ANYONE?: NO

## 2025-02-06 SDOH — SOCIAL STABILITY: SOCIAL INSECURITY: ARE THERE ANY APPARENT SIGNS OF INJURIES/BEHAVIORS THAT COULD BE RELATED TO ABUSE/NEGLECT?: NO

## 2025-02-06 SDOH — SOCIAL STABILITY: SOCIAL INSECURITY: DOES ANYONE TRY TO KEEP YOU FROM HAVING/CONTACTING OTHER FRIENDS OR DOING THINGS OUTSIDE YOUR HOME?: NO

## 2025-02-06 SDOH — SOCIAL STABILITY: SOCIAL INSECURITY: HAVE YOU HAD THOUGHTS OF HARMING ANYONE ELSE?: NO

## 2025-02-06 ASSESSMENT — COGNITIVE AND FUNCTIONAL STATUS - GENERAL
PATIENT BASELINE BEDBOUND: NO
DAILY ACTIVITIY SCORE: 24
DAILY ACTIVITIY SCORE: 24
MOBILITY SCORE: 24
MOBILITY SCORE: 24
DAILY ACTIVITIY SCORE: 24
MOBILITY SCORE: 24

## 2025-02-06 ASSESSMENT — PAIN SCALES - GENERAL
PAINLEVEL_OUTOF10: 6
PAINLEVEL_OUTOF10: 8
PAINLEVEL_OUTOF10: 5 - MODERATE PAIN
PAINLEVEL_OUTOF10: 5 - MODERATE PAIN
PAINLEVEL_OUTOF10: 0 - NO PAIN

## 2025-02-06 ASSESSMENT — LIFESTYLE VARIABLES
SKIP TO QUESTIONS 9-10: 1
HOW OFTEN DO YOU HAVE A DRINK CONTAINING ALCOHOL: MONTHLY OR LESS
HOW OFTEN DO YOU HAVE 6 OR MORE DRINKS ON ONE OCCASION: NEVER
AUDIT-C TOTAL SCORE: 1
PRESCIPTION_ABUSE_PAST_12_MONTHS: NO
SKIP TO QUESTIONS 9-10: 1
HOW OFTEN DO YOU HAVE A DRINK CONTAINING ALCOHOL: MONTHLY OR LESS
HOW MANY STANDARD DRINKS CONTAINING ALCOHOL DO YOU HAVE ON A TYPICAL DAY: 1 OR 2
HOW OFTEN DO YOU HAVE 6 OR MORE DRINKS ON ONE OCCASION: NEVER
AUDIT-C TOTAL SCORE: 1
HOW MANY STANDARD DRINKS CONTAINING ALCOHOL DO YOU HAVE ON A TYPICAL DAY: 1 OR 2
AUDIT-C TOTAL SCORE: 1
SKIP TO QUESTIONS 9-10: 1
SUBSTANCE_ABUSE_PAST_12_MONTHS: NO

## 2025-02-06 ASSESSMENT — ACTIVITIES OF DAILY LIVING (ADL)
LACK_OF_TRANSPORTATION: NO
HEARING - LEFT EAR: FUNCTIONAL
LACK_OF_TRANSPORTATION: NO
HEARING - RIGHT EAR: FUNCTIONAL
LACK_OF_TRANSPORTATION: NO
BATHING: INDEPENDENT
WALKS IN HOME: INDEPENDENT
TOILETING: INDEPENDENT
FEEDING YOURSELF: INDEPENDENT
DRESSING YOURSELF: INDEPENDENT
JUDGMENT_ADEQUATE_SAFELY_COMPLETE_DAILY_ACTIVITIES: YES
ADEQUATE_TO_COMPLETE_ADL: YES
PATIENT'S MEMORY ADEQUATE TO SAFELY COMPLETE DAILY ACTIVITIES?: YES
GROOMING: INDEPENDENT

## 2025-02-06 ASSESSMENT — ENCOUNTER SYMPTOMS
SORE THROAT: 0
SHORTNESS OF BREATH: 1
DIZZINESS: 0
HEADACHES: 1
FEVER: 1
EYE ITCHING: 0
APPETITE CHANGE: 1
COUGH: 1
FATIGUE: 1
CHILLS: 1
NAUSEA: 1
SINUS PRESSURE: 1
ARTHRALGIAS: 1
EYE REDNESS: 0
DIARRHEA: 0
VOMITING: 1
WEAKNESS: 1
CONFUSION: 1
DYSURIA: 0
ABDOMINAL PAIN: 1
MYALGIAS: 1
COLOR CHANGE: 0

## 2025-02-06 ASSESSMENT — PATIENT HEALTH QUESTIONNAIRE - PHQ9
1. LITTLE INTEREST OR PLEASURE IN DOING THINGS: NOT AT ALL
SUM OF ALL RESPONSES TO PHQ9 QUESTIONS 1 & 2: 0
1. LITTLE INTEREST OR PLEASURE IN DOING THINGS: NOT AT ALL
2. FEELING DOWN, DEPRESSED OR HOPELESS: NOT AT ALL
SUM OF ALL RESPONSES TO PHQ9 QUESTIONS 1 & 2: 0
2. FEELING DOWN, DEPRESSED OR HOPELESS: NOT AT ALL

## 2025-02-06 ASSESSMENT — PAIN - FUNCTIONAL ASSESSMENT
PAIN_FUNCTIONAL_ASSESSMENT: 0-10

## 2025-02-06 ASSESSMENT — COLUMBIA-SUICIDE SEVERITY RATING SCALE - C-SSRS

## 2025-02-06 ASSESSMENT — PAIN DESCRIPTION - LOCATION: LOCATION: HEAD

## 2025-02-06 NOTE — PROGRESS NOTES
02/06/25 0634   Discharge Planning   Living Arrangements Spouse/significant other   Support Systems Spouse/significant other   Assistance Needed A&OX4; independent with ADLs with no DME; drives; room air baseline and currently room air; PCP Dr Novak   Type of Residence Private residence   Number of Stairs to Enter Residence 13   Number of Stairs Within Residence 13   Do you have animals or pets at home? Yes   Type of Animals or Pets dog   Who is requesting discharge planning? Provider   Home or Post Acute Services None   Expected Discharge Disposition Home  (Patient denies home going needs at this time)   Does the patient need discharge transport arranged? No   Financial Resource Strain   How hard is it for you to pay for the very basics like food, housing, medical care, and heating? Not hard   Housing Stability   In the last 12 months, was there a time when you were not able to pay the mortgage or rent on time? N   In the past 12 months, how many times have you moved where you were living? 0   At any time in the past 12 months, were you homeless or living in a shelter (including now)? N   Transportation Needs   In the past 12 months, has lack of transportation kept you from medical appointments or from getting medications? no   In the past 12 months, has lack of transportation kept you from meetings, work, or from getting things needed for daily living? No   Intensity of Service   Intensity of Service 0-30 min     02/06/2025 0635am  Spoke with patient bedside in ED

## 2025-02-06 NOTE — ED PROVIDER NOTES
HPI   Chief Complaint   Patient presents with    Shortness of Breath    Flu Symptoms       Patient is a 70-year-old female presenting for worsening shortness of breath.  The patient's symptoms started on Saturday and steadily worsened including abrupt worsening this evening.  She initially had persistent cough.  Does have a history of asthma and tried her albuterol inhaler at home without significant improvement of symptoms.  Denies any fever or productive cough.  Has had significant myalgias.              Patient History   Past Medical History:   Diagnosis Date    Personal history of other diseases of the digestive system 09/20/2013    History of esophageal reflux     Past Surgical History:   Procedure Laterality Date    OTHER SURGICAL HISTORY  11/11/2022    Appendectomy    OTHER SURGICAL HISTORY  11/11/2022    Biceps tendon rupture repair     Family History   Problem Relation Name Age of Onset    Lung cancer Father      Other (m aunt breast cancer hx) Other       Social History     Tobacco Use    Smoking status: Never    Smokeless tobacco: Never   Vaping Use    Vaping status: Never Used   Substance Use Topics    Alcohol use: Not Currently     Comment: socially    Drug use: Never       Physical Exam   ED Triage Vitals [02/06/25 0054]   Temperature Heart Rate Respirations BP   36.8 °C (98.2 °F) 70 18 150/89      Pulse Ox Temp src Heart Rate Source Patient Position   96 % -- -- --      BP Location FiO2 (%)     -- --       Physical Exam  Constitutional:       General: She is not in acute distress.     Appearance: She is not toxic-appearing.   Cardiovascular:      Rate and Rhythm: Normal rate and regular rhythm.   Pulmonary:      Comments: Good inspiratory effort however diffuse end expiratory wheezing bilaterally.  No stridor or rhonchorous sounds  Abdominal:      Palpations: Abdomen is soft.      Tenderness: There is no abdominal tenderness. There is no guarding or rebound.   Musculoskeletal:      Right lower leg: No  edema.      Left lower leg: No edema.   Neurological:      General: No focal deficit present.      Mental Status: She is alert and oriented to person, place, and time.           ED Course & MDM   ED Course as of 02/06/25 0430   Thu Feb 06, 2025 0107 EKG interpreted as sinus rhythm at a rate of 67 bpm, left axis deviation, no ST elevations depressions or T wave inversions/ischemia, QTc of 454 [AV]      ED Course User Index  [AV] Juwan Knox MD         Diagnoses as of 02/06/25 0430   Mild intermittent asthma with exacerbation ([facility])   Influenza A                 No data recorded                                 Medical Decision Making  Patient is a 70-year-old female presenting for shortness of breath.  History physical examination was concerning for viral process with acute exacerbation of asthma.  The patient had a chest x-ray performed that was independently interpreted negative for acute consolidation at this time.  Viral swabs were also obtained and ultimately positive for influenza A.  The patient is not in the for 72-hour window to qualify for Tamiflu at this time and she does not have severe illness requiring oxygen requirement.  The patient was given steroids and aerosolized treatments with improvement of respiratory status however did have side effects with regards to the albuterol inhaler including excessive fidgetiness and anxiety.  Upon repeat assessment the patient had return of expiratory wheezing and still had exertional dyspnea however was not hypoxic.  Given this I did offer hospitalization which the patient accepted at this time.        Procedure  Procedures     Juwan Knox MD  02/06/25 0435

## 2025-02-06 NOTE — PROGRESS NOTES
"  Subjective    Patient reports breathing a little better today.  She reports occasional nausea but no vomiting since come to the hospital.  Patient denies chest pain, diarrhea.    Objective    Vitals  Visit Vitals  /86 (BP Location: Right arm, Patient Position: Sitting)   Pulse 83   Temp 37 °C (98.6 °F) (Temporal)   Resp 18   Ht 1.6 m (5' 3\")   Wt 72.6 kg (160 lb)   SpO2 (!) 93%   BMI 28.34 kg/m²   OB Status Postmenopausal   Smoking Status Never   BSA 1.8 m²       Physical Exam   General: Alert.  NAD.   HEENT: Sclera clear.  CVS: RRR.   Lungs: Scattered wheezes and coarse breath sounds.  Abdomen: Soft.  Nontender.  Bowel sounds present.  Extremities: No pitting edema bilateral ankles.  Psychiatric: Cooperative.     IOs  No intake or output data in the 24 hours ending 02/06/25 1429    Labs:   Results from last 72 hours   Lab Units 02/06/25  0058   SODIUM mmol/L 141   POTASSIUM mmol/L 3.5   CHLORIDE mmol/L 103   CO2 mmol/L 23   BUN mg/dL 14   CREATININE mg/dL 0.77   GLUCOSE mg/dL 107*   CALCIUM mg/dL 9.1   ANION GAP mmol/L 19   EGFR mL/min/1.73m*2 83      Results from last 72 hours   Lab Units 02/06/25  0058   WBC AUTO x10*3/uL 5.2   HEMOGLOBIN g/dL 12.9   HEMATOCRIT % 39.0   PLATELETS AUTO x10*3/uL 246      Lab Results   Component Value Date    CALCIUM 9.1 02/06/2025    PHOS 4.0 07/09/2022      No results found for: \"CRP\"   [unfilled]       Images  XR chest 2 views  Narrative: Interpreted By:  Richard Nunez,   STUDY:  XR CHEST 2 VIEWS;  2/6/2025 1:41 am      INDICATION:  Signs/Symptoms:SOB, productive cough.          COMPARISON:  Chest radiograph 09/19/2024.      ACCESSION NUMBER(S):  [id number]      ORDERING CLINICIAN:  MAURIZIO GUTIERREZ      FINDINGS:                  CARDIOMEDIASTINAL SILHOUETTE:  Cardiomediastinal silhouette is normal in size and configuration.      LUNGS:  Lungs are clear.      ABDOMEN:  No remarkable upper abdominal findings.      BONES:  No acute osseous changes.      Impression: " 1.  No evidence of acute cardiopulmonary process.              MACRO:  None      Signed by: Richard Nunez 2/6/2025 2:19 AM  Dictation workstation:   [id number]      Meds  Scheduled medications  docusate sodium, 100 mg, oral, BID  enoxaparin, 40 mg, subcutaneous, q24h  guaiFENesin, 1,200 mg, oral, Daily  ipratropium-albuteroL, 3 mL, nebulization, TID  lactobacillus acidophilus, 1 capsule, oral, Daily  methylPREDNISolone sodium succinate (PF), 40 mg, intravenous, q8h  nystatin, 4 mL, Swish & Swallow, 4x daily  oseltamivir, 75 mg, oral, BID  pantoprazole, 40 mg, oral, Daily  [START ON 2/7/2025] predniSONE, 40 mg, oral, Daily      Continuous medications     PRN medications  PRN medications: acetaminophen **OR** acetaminophen **OR** acetaminophen, albuterol, ibuprofen, melatonin, ondansetron **OR** ondansetron     Assessment and Plan    Jenn Antunez is a 70 y.o. female with past medical history of asthma, AILYN, GERD, admitted to the hospital secondary to influenza A with asthma exacerbation.     Influenza A with asthma exacerbation  -Chest x-ray with no acute cardiopulmonary process.   -Continue Tamiflu, prednisone 40 mg daily, DuoNebs. Will give breo per our formulary as patient is on Advair at home.  -Monitor.    Oral thrush  -Continue statin and monitor.    GERD  -Continue PPI and monitor.    DVT prophylaxis  -SCDs and Lovenox subcu.

## 2025-02-06 NOTE — ASSESSMENT & PLAN NOTE
-Sats upper 80's/low 90's on RA  -Very congested sounding  Solumedrol, mucinex, breathing treatments, Mg  Resume LABA on discharge  If requiring oxygen later would recommend consulting pulmonary  Pulse ox

## 2025-02-06 NOTE — ASSESSMENT & PLAN NOTE
-Patient with severe flu symptoms with noted shortness of breath and trouble breathing, muscle pains and underlying asthma; has already been symptomatic for 5 days  Did start Tamiflu for 5 days due to severe flu and risk of deterioration  Supportive care    Thrush  Nystatin     GERD  PPI    AILYN  Mouth guard    Full Code

## 2025-02-06 NOTE — H&P
History Of Present Illness  Jenn Antunez is a 70 y.o. female (DNR) with pertinent medical history of asthma, AILYN, GERD who presented to [facility] ER with shortness of breath and productive cough, muscle aches, anorexia, sinus pressure, low-grade fever and chills, nausea & vomiting and confusion that began 5 days ago.  She denied chest pain.   Patient says she tried taking Advil, Mucinex, Robitussin, NyQuil and has not obtained full relief.  Patient is concerned because she has gotten pneumonia in the past and would like to be admitted for symptomatic treatment. Shee was noted in the ER to desaturate to 89% on room air with conversation.  Pertinent workup in the ER included: Chest x-ray that showed lungs are clear and no acute process.  Patient did not appear dehydrated from lab work or on exam and kidney function within normal limits, CBC unremarkable, flu A positive.    Past Medical History  HLD, glaucoma narrow end, palpitations and PVC, heart murmur , AILYN could not use CPAP machine, prediabetes, acid reflux, asthma, melanoma.    Surgical History  Appendectomy.     Social History  She reports that she has never smoked. She has never used smokeless tobacco. She reports rare  alcohol use. She reports that she does not use drugs.    Family History  Family History   Problem Relation Name Age of Onset    Lung cancer Father      Other (m aunt breast cancer hx) Other       Allergies  Azithromycin, Erythromycin, Nitrofurantoin macrocrystal, Nitrofurantoin macrocrystalline, Nitrofurantoin monohyd/m-cryst, Sulfa (sulfonamide antibiotics), and Sulfur    Review of Systems   Constitutional:  Positive for appetite change, chills, fatigue and fever.   HENT:  Positive for sinus pressure. Negative for sore throat.         Says she has thrush   Eyes:  Negative for redness and itching.   Respiratory:  Positive for cough and shortness of breath.    Cardiovascular:  Negative for chest pain and leg swelling.   Gastrointestinal:   Positive for abdominal pain, nausea and vomiting. Negative for diarrhea.        Gen mild abd pain   Genitourinary:  Negative for dysuria and urgency.   Musculoskeletal:  Positive for arthralgias and myalgias.   Skin:  Negative for color change and pallor.   Neurological:  Positive for weakness and headaches. Negative for dizziness.   Psychiatric/Behavioral:  Positive for confusion.         + brain fog     Physical Exam  Vitals reviewed.   Constitutional:       General: She is not in acute distress.     Appearance: She is ill-appearing. She is not toxic-appearing or diaphoretic.   HENT:      Head: Normocephalic and atraumatic.      Mouth/Throat:      Mouth: Mucous membranes are dry.      Pharynx: Oropharynx is clear.      Comments: Tongue coated yellow  Eyes:      Extraocular Movements: Extraocular movements intact.      Conjunctiva/sclera: Conjunctivae normal.   Cardiovascular:      Rate and Rhythm: Normal rate and regular rhythm.      Pulses: Normal pulses.      Heart sounds: Murmur heard.   Pulmonary:      Breath sounds: Wheezing present. No rhonchi.      Comments: Poor lung sounds throughout and diminished with scattered wheezes, 89% on RA with conversation  Abdominal:      General: Bowel sounds are normal. There is no distension.      Palpations: Abdomen is soft.      Tenderness: There is no abdominal tenderness. There is no guarding or rebound.   Musculoskeletal:         General: No swelling. Normal range of motion.      Right lower leg: No edema.      Left lower leg: No edema.      Comments: Hand grasps 5/5 and equal and able to lift legs off the bed   Skin:     General: Skin is warm and dry.      Coloration: Skin is pale. Skin is not jaundiced.   Neurological:      General: No focal deficit present.      Mental Status: She is alert and oriented to person, place, and time. Mental status is at baseline.      Motor: No weakness.   Psychiatric:         Mood and Affect: Mood normal.         Behavior: Behavior  normal.       Last Recorded Vitals  BP (!) 141/92   Pulse 93   Temp 36.8 °C (98.2 °F)   Resp 18   Wt 72.6 kg (160 lb)   SpO2 94%     Relevant Results  Scheduled medications  docusate sodium, 100 mg, oral, BID  enoxaparin, 40 mg, subcutaneous, q24h  guaiFENesin, 1,200 mg, oral, Daily  ipratropium-albuteroL, 3 mL, nebulization, TID  lactobacillus acidophilus, 1 capsule, oral, Daily  magnesium sulfate, 1 g, intravenous, Once  methylPREDNISolone sodium succinate (PF), 40 mg, intravenous, q8h  nystatin, 4 mL, Swish & Swallow, 4x daily  oseltamivir, 75 mg, oral, BID  pantoprazole, 40 mg, oral, Daily  [START ON 2/7/2025] predniSONE, 40 mg, oral, Daily      Continuous medications     PRN medications  PRN medications: acetaminophen **OR** acetaminophen **OR** acetaminophen, albuterol, melatonin, ondansetron **OR** ondansetron    Results for orders placed or performed during the hospital encounter of 02/06/25 (from the past 24 hours)   Sars-CoV-2 and Influenza A/B PCR   Result Value Ref Range    Flu A Result Detected (A) Not Detected    Flu B Result Not Detected Not Detected    Coronavirus 2019, PCR Not Detected Not Detected   RSV PCR   Result Value Ref Range    RSV PCR Not Detected Not Detected   CBC   Result Value Ref Range    WBC 5.2 4.4 - 11.3 x10*3/uL    nRBC 0.0 0.0 - 0.0 /100 WBCs    RBC 4.29 4.00 - 5.20 x10*6/uL    Hemoglobin 12.9 12.0 - 16.0 g/dL    Hematocrit 39.0 36.0 - 46.0 %    MCV 91 80 - 100 fL    MCH 30.1 26.0 - 34.0 pg    MCHC 33.1 32.0 - 36.0 g/dL    RDW 14.0 11.5 - 14.5 %    Platelets 246 150 - 450 x10*3/uL   Comprehensive metabolic panel   Result Value Ref Range    Glucose 107 (H) 74 - 99 mg/dL    Sodium 141 136 - 145 mmol/L    Potassium 3.5 3.5 - 5.3 mmol/L    Chloride 103 98 - 107 mmol/L    Bicarbonate 23 21 - 32 mmol/L    Anion Gap 19 10 - 20 mmol/L    Urea Nitrogen 14 6 - 23 mg/dL    Creatinine 0.77 0.50 - 1.05 mg/dL    eGFR 83 >60 mL/min/1.73m*2    Calcium 9.1 8.6 - 10.3 mg/dL    Albumin 4.2 3.4  - 5.0 g/dL    Alkaline Phosphatase 61 33 - 136 U/L    Total Protein 6.8 6.4 - 8.2 g/dL    AST 31 9 - 39 U/L    Bilirubin, Total 0.3 0.0 - 1.2 mg/dL    ALT 43 7 - 45 U/L     XR chest 2 views    Result Date: 2/6/2025  Interpreted By:  Richard Nunez, STUDY: XR CHEST 2 VIEWS;  2/6/2025 1:41 am   INDICATION: Signs/Symptoms:SOB, productive cough.     COMPARISON: Chest radiograph 09/19/2024.   ACCESSION NUMBER(S): [id number]   ORDERING CLINICIAN: MAURIZIO GUTIERREZ   FINDINGS:         CARDIOMEDIASTINAL SILHOUETTE: Cardiomediastinal silhouette is normal in size and configuration.   LUNGS: Lungs are clear.   ABDOMEN: No remarkable upper abdominal findings.   BONES: No acute osseous changes.       1.  No evidence of acute cardiopulmonary process.       MACRO: None   Signed by: Richard Nunez 2/6/2025 2:19 AM Dictation workstation:   [id number]      Assessment/Plan   Assessment & Plan  Mild intermittent asthma with exacerbation ([facility])  -Sats upper 80's/low 90's on RA  -Very congested sounding  Solumedrol, mucinex, breathing treatments, Mg  Resume LABA on discharge  If requiring oxygen later would recommend consulting pulmonary  Pulse ox    Flu  -Patient with severe flu symptoms with noted shortness of breath and trouble breathing, muscle pains and underlying asthma; has already been symptomatic for 5 days  Did start Tamiflu for 5 days due to severe flu and risk of deterioration  Supportive care    Thrush  Nystatin     GERD  PPI    AILYN  Mouth guard    Full Code    DVT Px  Lovenox, SCDs    Amber Davila, APRN-CNP  60 min spent interviewing and assessing patient, placing orders, updating MAR, CODE STATUS and care plan.

## 2025-02-06 NOTE — ED TRIAGE NOTES
Pt presents ambulatory via POV through triage from home for c/o SOB and a productive cough with green/yellow sputum that started yesterday evening. Pt states she has had cough, N/V, body aches since Saturday. Call light within reach.

## 2025-02-06 NOTE — PROGRESS NOTES
Pharmacy Medication History Review    Jenn Antunez is a 70 y.o. female admitted for Mild intermittent asthma with exacerbation ([facility]). Pharmacy reviewed the patient's [id number] medications and allergies for accuracy.    The list below reflectives the updated PTA list. Please review each medication in order reconciliation for additional clarification and justification.  Prior to Admission Medications   Prescriptions Last Dose Informant Patient Reported? Taking?   EPINEPHrine 0.3 mg/0.3 mL injection syringe Not Taking Self Not taking  Not taking    Sig: Inject 0.3 mL (0.3 mg) into the muscle 1 time if needed for anaphylaxis for up to 1 dose. Inject into upper leg. Call 911 after use.   Patient not taking: Reported on 2/6/2025   acyclovir (Zovirax) 400 mg tablet Not Taking Self Not taking  Not taking Not taking    Sig: Take 1 tablet (400 mg) by mouth three times daily for 5 days.   Patient not taking: Reported on 2/6/2025   albuterol (Ventolin HFA) 90 mcg/actuation inhaler 2/5/2025 Evening Self Yes Yes   Sig: Inhale 2 puffs every 4 hours if needed for wheezing or shortness of breath.   budesonide-formoteroL (Symbicort) 160-4.5 mcg/actuation inhaler Not Taking Self Not taking  Not taking    Sig: Inhale 2 puffs 2 times a day. Rinse mouth with water after use to reduce aftertaste and incidence of candidiasis. Do not swallow.   Patient not taking: Reported on 2/6/2025   clotrimazole (Mycelex) 10 mg claire 2/5/2025 Morning Self Yes Yes   Sig: Take 1 tablet (10 mg) by mouth 4 times a day.   estradiol (Estrace) 0.01 % (0.1 mg/gram) vaginal cream Not Taking Self Not taking  Not taking    Sig: Apply a pea size amount to the vaginal opening three nights a week at bedtime.   Patient not taking: Reported on 2/6/2025   fluticasone propion-salmeteroL (Advair HFA) 115-21 mcg/actuation inhaler 2/5/2025 Self Not taking  Not taking   Sig: INHALE 2 PUFFS BY MOUTH EVERY 12 HOURS IN THE MORNING AND IN THE EVENING    fluticasone propion-salmeteroL (Advair HFA) 45-21 mcg/actuation inhaler 2/5/2025 Evening Self No Yes   Sig: Inhale 2 puffs 2 times a day.   fluticasone propion-salmeteroL (Advair HFA) 45-21 mcg/actuation inhaler   Yes  Yes   Sig: Inhale 2 puffs 2 times a day.   ibuprofen 200 mg tablet 2/5/2025 Evening Self Yes Yes   Sig: Take 1 tablet (200 mg) by mouth every 6 hours.   lactobacillus acidophilus capsule 2/5/2025 Morning Self Yes Yes   Sig: Take by mouth.   nystatin (Mycostatin) 100,000 unit/gram powder Not Taking Self Not taking  Not taking    Sig: Apply 1 Application topically 3 times a day.   Patient not taking: Reported on 2/6/2025   pantoprazole (ProtoNix) 40 mg EC tablet 2/5/2025 Self Yes Yes   Sig: TAKE 1 TABLET BY MOUTH EVERY DAY   tretinoin (Retin-A) 0.05 % cream 2/5/2025 Evening Self Yes Yes   Sig: APPLY PEA SIZED AMOUNT TO FACE EVERY NIGHT   urea 40 % lotion Past Month Self Yes Yes   Sig: APPLY TO AFFECTED AREAS EVERY DAY      Facility-Administered Medications: None           The list below reflectives the updated allergy list. Please review each documented allergy for additional clarification and justification.  Allergies  Reviewed by Jazmin Chávez RN on 2/6/2025        Severity Reactions Comments    Azithromycin Not Specified Unknown     Erythromycin Not Specified Other     Nitrofurantoin Macrocrystal Not Specified Other     Nitrofurantoin Macrocrystalline Not Specified Unknown Stomach upset    Nitrofurantoin Monohyd/m-cryst Not Specified Unknown     Sulfa (sulfonamide Antibiotics) Not Specified Swelling     Sulfur Not Specified Swelling             Below are additional concerns with the patient's PTA list.      MEENA RUIZ

## 2025-02-07 LAB
ALBUMIN SERPL BCP-MCNC: 3.9 G/DL (ref 3.4–5)
ANION GAP SERPL CALC-SCNC: 15 MMOL/L (ref 10–20)
ATRIAL RATE: 67 BPM
BUN SERPL-MCNC: 16 MG/DL (ref 6–23)
CALCIUM SERPL-MCNC: 8.9 MG/DL (ref 8.6–10.3)
CHLORIDE SERPL-SCNC: 108 MMOL/L (ref 98–107)
CO2 SERPL-SCNC: 24 MMOL/L (ref 21–32)
CREAT SERPL-MCNC: 0.49 MG/DL (ref 0.5–1.05)
EGFRCR SERPLBLD CKD-EPI 2021: >90 ML/MIN/1.73M*2
ERYTHROCYTE [DISTWIDTH] IN BLOOD BY AUTOMATED COUNT: 13.7 % (ref 11.5–14.5)
GLUCOSE SERPL-MCNC: 139 MG/DL (ref 74–99)
HCT VFR BLD AUTO: 36.4 % (ref 36–46)
HGB BLD-MCNC: 12.3 G/DL (ref 12–16)
MAGNESIUM SERPL-MCNC: 1.41 MG/DL (ref 1.6–2.4)
MCH RBC QN AUTO: 29.9 PG (ref 26–34)
MCHC RBC AUTO-ENTMCNC: 33.8 G/DL (ref 32–36)
MCV RBC AUTO: 88 FL (ref 80–100)
NRBC BLD-RTO: 0 /100 WBCS (ref 0–0)
P AXIS: 44 DEGREES
P OFFSET: 179 MS
P ONSET: 133 MS
PHOSPHATE SERPL-MCNC: 2.4 MG/DL (ref 2.5–4.9)
PLATELET # BLD AUTO: 262 X10*3/UL (ref 150–450)
POTASSIUM SERPL-SCNC: 4 MMOL/L (ref 3.5–5.3)
PR INTERVAL: 152 MS
Q ONSET: 209 MS
QRS COUNT: 11 BEATS
QRS DURATION: 92 MS
QT INTERVAL: 430 MS
QTC CALCULATION(BAZETT): 454 MS
QTC FREDERICIA: 446 MS
R AXIS: -46 DEGREES
RBC # BLD AUTO: 4.12 X10*6/UL (ref 4–5.2)
SODIUM SERPL-SCNC: 143 MMOL/L (ref 136–145)
T AXIS: 30 DEGREES
T OFFSET: 424 MS
VENTRICULAR RATE: 67 BPM
WBC # BLD AUTO: 6.7 X10*3/UL (ref 4.4–11.3)

## 2025-02-07 PROCEDURE — 94760 N-INVAS EAR/PLS OXIMETRY 1: CPT

## 2025-02-07 PROCEDURE — 99232 SBSQ HOSP IP/OBS MODERATE 35: CPT | Performed by: INTERNAL MEDICINE

## 2025-02-07 PROCEDURE — 2500000005 HC RX 250 GENERAL PHARMACY W/O HCPCS: Performed by: INTERNAL MEDICINE

## 2025-02-07 PROCEDURE — 2500000004 HC RX 250 GENERAL PHARMACY W/ HCPCS (ALT 636 FOR OP/ED): Performed by: NURSE PRACTITIONER

## 2025-02-07 PROCEDURE — 94640 AIRWAY INHALATION TREATMENT: CPT

## 2025-02-07 PROCEDURE — 83735 ASSAY OF MAGNESIUM: CPT | Performed by: INTERNAL MEDICINE

## 2025-02-07 PROCEDURE — 84100 ASSAY OF PHOSPHORUS: CPT | Performed by: INTERNAL MEDICINE

## 2025-02-07 PROCEDURE — 1100000001 HC PRIVATE ROOM DAILY

## 2025-02-07 PROCEDURE — 96372 THER/PROPH/DIAG INJ SC/IM: CPT | Performed by: NURSE PRACTITIONER

## 2025-02-07 PROCEDURE — 2500000001 HC RX 250 WO HCPCS SELF ADMINISTERED DRUGS (ALT 637 FOR MEDICARE OP): Performed by: INTERNAL MEDICINE

## 2025-02-07 PROCEDURE — 85027 COMPLETE CBC AUTOMATED: CPT | Performed by: INTERNAL MEDICINE

## 2025-02-07 PROCEDURE — 2500000001 HC RX 250 WO HCPCS SELF ADMINISTERED DRUGS (ALT 637 FOR MEDICARE OP): Performed by: NURSE PRACTITIONER

## 2025-02-07 PROCEDURE — 80069 RENAL FUNCTION PANEL: CPT | Performed by: INTERNAL MEDICINE

## 2025-02-07 PROCEDURE — 2500000002 HC RX 250 W HCPCS SELF ADMINISTERED DRUGS (ALT 637 FOR MEDICARE OP, ALT 636 FOR OP/ED): Performed by: NURSE PRACTITIONER

## 2025-02-07 PROCEDURE — 36415 COLL VENOUS BLD VENIPUNCTURE: CPT | Performed by: INTERNAL MEDICINE

## 2025-02-07 PROCEDURE — 2500000002 HC RX 250 W HCPCS SELF ADMINISTERED DRUGS (ALT 637 FOR MEDICARE OP, ALT 636 FOR OP/ED): Performed by: INTERNAL MEDICINE

## 2025-02-07 PROCEDURE — 2500000004 HC RX 250 GENERAL PHARMACY W/ HCPCS (ALT 636 FOR OP/ED): Performed by: INTERNAL MEDICINE

## 2025-02-07 RX ORDER — MAGNESIUM SULFATE HEPTAHYDRATE 40 MG/ML
2 INJECTION, SOLUTION INTRAVENOUS ONCE
Status: COMPLETED | OUTPATIENT
Start: 2025-02-07 | End: 2025-02-07

## 2025-02-07 RX ADMIN — IPRATROPIUM BROMIDE AND ALBUTEROL SULFATE 3 ML: 2.5; .5 SOLUTION RESPIRATORY (INHALATION) at 08:42

## 2025-02-07 RX ADMIN — ENOXAPARIN SODIUM 40 MG: 40 INJECTION SUBCUTANEOUS at 08:57

## 2025-02-07 RX ADMIN — CLOTRIMAZOLE 10 MG: 10 LOZENGE ORAL at 21:39

## 2025-02-07 RX ADMIN — PANTOPRAZOLE SODIUM 40 MG: 40 TABLET, DELAYED RELEASE ORAL at 08:57

## 2025-02-07 RX ADMIN — CLOTRIMAZOLE 10 MG: 10 LOZENGE ORAL at 13:41

## 2025-02-07 RX ADMIN — Medication 1 CAPSULE: at 08:57

## 2025-02-07 RX ADMIN — OSELTAMAVIR PHOSPHATE 75 MG: 75 CAPSULE ORAL at 21:40

## 2025-02-07 RX ADMIN — SODIUM PHOSPHATE, MONOBASIC, MONOHYDRATE AND SODIUM PHOSPHATE, DIBASIC, ANHYDROUS 15 MMOL: 276; 142 INJECTION, SOLUTION INTRAVENOUS at 18:14

## 2025-02-07 RX ADMIN — CLOTRIMAZOLE 10 MG: 10 LOZENGE ORAL at 18:14

## 2025-02-07 RX ADMIN — CLOTRIMAZOLE 10 MG: 10 LOZENGE ORAL at 08:57

## 2025-02-07 RX ADMIN — FLUTICASONE FUROATE AND VILANTEROL TRIFENATATE 1 PUFF: 200; 25 POWDER RESPIRATORY (INHALATION) at 13:41

## 2025-02-07 RX ADMIN — MAGNESIUM SULFATE HEPTAHYDRATE 2 G: 2 INJECTION, SOLUTION INTRAVENOUS at 15:53

## 2025-02-07 RX ADMIN — GUAIFENESIN 1200 MG: 600 TABLET ORAL at 08:57

## 2025-02-07 RX ADMIN — PREDNISONE 40 MG: 20 TABLET ORAL at 08:57

## 2025-02-07 RX ADMIN — OSELTAMAVIR PHOSPHATE 75 MG: 75 CAPSULE ORAL at 08:57

## 2025-02-07 RX ADMIN — IPRATROPIUM BROMIDE AND ALBUTEROL SULFATE 3 ML: 2.5; .5 SOLUTION RESPIRATORY (INHALATION) at 19:35

## 2025-02-07 RX ADMIN — IPRATROPIUM BROMIDE AND ALBUTEROL SULFATE 3 ML: 2.5; .5 SOLUTION RESPIRATORY (INHALATION) at 15:15

## 2025-02-07 ASSESSMENT — ACTIVITIES OF DAILY LIVING (ADL): LACK_OF_TRANSPORTATION: NO

## 2025-02-07 ASSESSMENT — PAIN - FUNCTIONAL ASSESSMENT
PAIN_FUNCTIONAL_ASSESSMENT: 0-10
PAIN_FUNCTIONAL_ASSESSMENT: 0-10

## 2025-02-07 ASSESSMENT — PAIN SCALES - GENERAL
PAINLEVEL_OUTOF10: 0 - NO PAIN
PAINLEVEL_OUTOF10: 0 - NO PAIN

## 2025-02-07 NOTE — PROGRESS NOTES
"  Subjective    Patient reports breathing better.  She denies nausea, vomiting, diarrhea or chest pain.    Objective    Vitals  Visit Vitals  /88 (BP Location: Right arm)   Pulse 70   Temp 36.9 °C (98.4 °F) (Temporal)   Resp 17   Ht 1.6 m (5' 3\")   Wt 73.9 kg (163 lb)   SpO2 93%   BMI 28.87 kg/m²   OB Status Postmenopausal   Smoking Status Never   BSA 1.81 m²       Physical Exam   General: Alert.  No acute distress.  HEENT: Sclera clear.  CVS: RRR.   Lungs: Mildly diminished breath sounds with some wheezing bilaterally.  Abdomen: Soft. NT. +BS.   Extremities: No pitting edema bilat ankles.  Psychiatric: Cooperative.     IOs    Intake/Output Summary (Last 24 hours) at 2/7/2025 1452  Last data filed at 2/7/2025 1225  Gross per 24 hour   Intake 520 ml   Output --   Net 520 ml       Labs:   Results from last 72 hours   Lab Units 02/07/25  0724 02/06/25  0058   SODIUM mmol/L 143 141   POTASSIUM mmol/L 4.0 3.5   CHLORIDE mmol/L 108* 103   CO2 mmol/L 24 23   BUN mg/dL 16 14   CREATININE mg/dL 0.49* 0.77   GLUCOSE mg/dL 139* 107*   CALCIUM mg/dL 8.9 9.1   ANION GAP mmol/L 15 19   EGFR mL/min/1.73m*2 >90 83   PHOSPHORUS mg/dL 2.4*  --       Results from last 72 hours   Lab Units 02/07/25  0724 02/06/25  0058   WBC AUTO x10*3/uL 6.7 5.2   HEMOGLOBIN g/dL 12.3 12.9   HEMATOCRIT % 36.4 39.0   PLATELETS AUTO x10*3/uL 262 246      Lab Results   Component Value Date    CALCIUM 8.9 02/07/2025    PHOS 2.4 (L) 02/07/2025      No results found for: \"CRP\"   [unfilled]       Images  ECG 12 Lead  Sinus rhythm with Fusion complexes  Left anterior fascicular block  Moderate voltage criteria for LVH, may be normal variant ( R in aVL , Kendrick product )  Abnormal ECG  When compared with ECG of 13-DEC-2024 14:31, (unconfirmed)  Fusion complexes are now Present      Meds  Scheduled medications  clotrimazole, 10 mg, oral, 4x daily  docusate sodium, 100 mg, oral, BID  enoxaparin, 40 mg, subcutaneous, q24h  fluticasone " furoate-vilanteroL, 1 puff, inhalation, Daily  guaiFENesin, 1,200 mg, oral, Daily  ipratropium-albuteroL, 3 mL, nebulization, TID  lactobacillus acidophilus, 1 capsule, oral, Daily  oseltamivir, 75 mg, oral, BID  pantoprazole, 40 mg, oral, Daily  predniSONE, 40 mg, oral, Daily      Continuous medications     PRN medications  PRN medications: acetaminophen **OR** acetaminophen **OR** acetaminophen, albuterol, ibuprofen, melatonin, ondansetron **OR** ondansetron     Assessment and Plan    Jenn Antunez is a 70 y.o. female with past medical history of asthma, AILYN, GERD, admitted to the hospital secondary to influenza A with asthma exacerbation.      Influenza A with asthma exacerbation  -Chest x-ray with no acute cardiopulmonary process.   -Continue Tamiflu, prednisone 40 mg daily, DuoNebs, Breo (per our formulary as patient is on Advair at home).  -Improving overall, but patient still has wheezing and diminished breath sounds on exam.  Will continue to monitor.     Oral thrush  -Continue statin and monitor.    Hypophosphatemia  -Replace and monitor.    Hypomagnesemia  -Replace and monitor.     GERD  -Continue PPI and monitor.     DVT prophylaxis  -SCDs and Lovenox subcu.

## 2025-02-07 NOTE — CARE PLAN
The patient's goals for the shift include    Problem: Respiratory  Goal: No signs of respiratory distress (eg. Use of accessory muscles. Peds grunting)  Outcome: Progressing       The clinical goals for the shift include remain on room air    Over the shift, the patient had visitors and showered

## 2025-02-07 NOTE — PROGRESS NOTES
02/07/25 1311   Discharge Planning   Living Arrangements Spouse/significant other   Support Systems Spouse/significant other   Assistance Needed Alert and oriented x 3, Independent with ADL's, Drives; No DME used at home, Room air baseline and currently room air; PCP Dr Novak   Type of Residence Private residence   Number of Stairs to Enter Residence 13   Number of Stairs Within Residence 13   Type of Animals or Pets 1 dog   Who is requesting discharge planning? Provider   Home or Post Acute Services None   Expected Discharge Disposition Home   Does the patient need discharge transport arranged? No   Financial Resource Strain   How hard is it for you to pay for the very basics like food, housing, medical care, and heating? Not hard   Housing Stability   In the last 12 months, was there a time when you were not able to pay the mortgage or rent on time? N   In the past 12 months, how many times have you moved where you were living? 0   At any time in the past 12 months, were you homeless or living in a shelter (including now)? N   Transportation Needs   In the past 12 months, has lack of transportation kept you from medical appointments or from getting medications? no   In the past 12 months, has lack of transportation kept you from meetings, work, or from getting things needed for daily living? No   Patient Choice   Provider Choice list and CMS website (https://medicare.gov/care-compare#search) for post-acute Quality and Resource Measure Data were provided and reviewed with: Patient   Patient / Family choosing to utilize agency / facility established prior to hospitalization No   Stroke Family Assessment   Stroke Family Assessment Needed No   Intensity of Service   Intensity of Service 0-30 min

## 2025-02-08 VITALS
BODY MASS INDEX: 28.88 KG/M2 | HEIGHT: 63 IN | HEART RATE: 66 BPM | DIASTOLIC BLOOD PRESSURE: 89 MMHG | TEMPERATURE: 98.6 F | WEIGHT: 163 LBS | OXYGEN SATURATION: 93 % | SYSTOLIC BLOOD PRESSURE: 156 MMHG | RESPIRATION RATE: 18 BRPM

## 2025-02-08 LAB
ALBUMIN SERPL BCP-MCNC: 3.8 G/DL (ref 3.4–5)
ANION GAP SERPL CALC-SCNC: 14 MMOL/L (ref 10–20)
BUN SERPL-MCNC: 15 MG/DL (ref 6–23)
CALCIUM SERPL-MCNC: 8 MG/DL (ref 8.6–10.3)
CHLORIDE SERPL-SCNC: 105 MMOL/L (ref 98–107)
CO2 SERPL-SCNC: 25 MMOL/L (ref 21–32)
CREAT SERPL-MCNC: 0.58 MG/DL (ref 0.5–1.05)
EGFRCR SERPLBLD CKD-EPI 2021: >90 ML/MIN/1.73M*2
ERYTHROCYTE [DISTWIDTH] IN BLOOD BY AUTOMATED COUNT: 14 % (ref 11.5–14.5)
GLUCOSE SERPL-MCNC: 100 MG/DL (ref 74–99)
HCT VFR BLD AUTO: 36.8 % (ref 36–46)
HGB BLD-MCNC: 12 G/DL (ref 12–16)
MAGNESIUM SERPL-MCNC: 2.01 MG/DL (ref 1.6–2.4)
MCH RBC QN AUTO: 29.8 PG (ref 26–34)
MCHC RBC AUTO-ENTMCNC: 32.6 G/DL (ref 32–36)
MCV RBC AUTO: 91 FL (ref 80–100)
NRBC BLD-RTO: 0 /100 WBCS (ref 0–0)
PHOSPHATE SERPL-MCNC: 2.5 MG/DL (ref 2.5–4.9)
PLATELET # BLD AUTO: 247 X10*3/UL (ref 150–450)
POTASSIUM SERPL-SCNC: 3.4 MMOL/L (ref 3.5–5.3)
RBC # BLD AUTO: 4.03 X10*6/UL (ref 4–5.2)
SODIUM SERPL-SCNC: 141 MMOL/L (ref 136–145)
WBC # BLD AUTO: 8.3 X10*3/UL (ref 4.4–11.3)

## 2025-02-08 PROCEDURE — 2500000001 HC RX 250 WO HCPCS SELF ADMINISTERED DRUGS (ALT 637 FOR MEDICARE OP): Performed by: NURSE PRACTITIONER

## 2025-02-08 PROCEDURE — 83735 ASSAY OF MAGNESIUM: CPT | Performed by: INTERNAL MEDICINE

## 2025-02-08 PROCEDURE — 94640 AIRWAY INHALATION TREATMENT: CPT

## 2025-02-08 PROCEDURE — 80069 RENAL FUNCTION PANEL: CPT | Performed by: INTERNAL MEDICINE

## 2025-02-08 PROCEDURE — 99239 HOSP IP/OBS DSCHRG MGMT >30: CPT | Performed by: INTERNAL MEDICINE

## 2025-02-08 PROCEDURE — 2500000004 HC RX 250 GENERAL PHARMACY W/ HCPCS (ALT 636 FOR OP/ED): Performed by: NURSE PRACTITIONER

## 2025-02-08 PROCEDURE — 36415 COLL VENOUS BLD VENIPUNCTURE: CPT | Performed by: INTERNAL MEDICINE

## 2025-02-08 PROCEDURE — 2500000002 HC RX 250 W HCPCS SELF ADMINISTERED DRUGS (ALT 637 FOR MEDICARE OP, ALT 636 FOR OP/ED): Performed by: NURSE PRACTITIONER

## 2025-02-08 PROCEDURE — 2500000001 HC RX 250 WO HCPCS SELF ADMINISTERED DRUGS (ALT 637 FOR MEDICARE OP): Performed by: INTERNAL MEDICINE

## 2025-02-08 PROCEDURE — 2500000002 HC RX 250 W HCPCS SELF ADMINISTERED DRUGS (ALT 637 FOR MEDICARE OP, ALT 636 FOR OP/ED): Performed by: INTERNAL MEDICINE

## 2025-02-08 PROCEDURE — 85027 COMPLETE CBC AUTOMATED: CPT | Performed by: INTERNAL MEDICINE

## 2025-02-08 RX ORDER — OSELTAMIVIR PHOSPHATE 75 MG/1
75 CAPSULE ORAL 2 TIMES DAILY
Qty: 5 CAPSULE | Refills: 0 | Status: SHIPPED | OUTPATIENT
Start: 2025-02-08 | End: 2025-02-08

## 2025-02-08 RX ORDER — GUAIFENESIN 1200 MG/1
1200 TABLET, EXTENDED RELEASE ORAL 2 TIMES DAILY PRN
Qty: 10 TABLET | Refills: 0 | Status: SHIPPED | OUTPATIENT
Start: 2025-02-08

## 2025-02-08 RX ORDER — PREDNISONE 20 MG/1
40 TABLET ORAL DAILY
Qty: 10 TABLET | Refills: 0 | Status: SHIPPED | OUTPATIENT
Start: 2025-02-09 | End: 2025-02-14

## 2025-02-08 RX ORDER — PREDNISONE 20 MG/1
40 TABLET ORAL DAILY
Qty: 10 TABLET | Refills: 0 | Status: SHIPPED | OUTPATIENT
Start: 2025-02-09 | End: 2025-02-08

## 2025-02-08 RX ORDER — POTASSIUM CHLORIDE 20 MEQ/1
40 TABLET, EXTENDED RELEASE ORAL ONCE
Status: COMPLETED | OUTPATIENT
Start: 2025-02-08 | End: 2025-02-08

## 2025-02-08 RX ORDER — OSELTAMIVIR PHOSPHATE 75 MG/1
75 CAPSULE ORAL 2 TIMES DAILY
Qty: 5 CAPSULE | Refills: 0 | Status: SHIPPED | OUTPATIENT
Start: 2025-02-08 | End: 2025-02-11

## 2025-02-08 RX ORDER — GUAIFENESIN 1200 MG/1
1200 TABLET, EXTENDED RELEASE ORAL 2 TIMES DAILY PRN
Qty: 10 TABLET | Refills: 0 | Status: SHIPPED | OUTPATIENT
Start: 2025-02-08 | End: 2025-02-08

## 2025-02-08 RX ADMIN — OSELTAMAVIR PHOSPHATE 75 MG: 75 CAPSULE ORAL at 08:05

## 2025-02-08 RX ADMIN — PREDNISONE 40 MG: 20 TABLET ORAL at 08:05

## 2025-02-08 RX ADMIN — Medication 1 CAPSULE: at 08:05

## 2025-02-08 RX ADMIN — CLOTRIMAZOLE 10 MG: 10 LOZENGE ORAL at 12:44

## 2025-02-08 RX ADMIN — ENOXAPARIN SODIUM 40 MG: 40 INJECTION SUBCUTANEOUS at 08:04

## 2025-02-08 RX ADMIN — CLOTRIMAZOLE 10 MG: 10 LOZENGE ORAL at 06:39

## 2025-02-08 RX ADMIN — POTASSIUM CHLORIDE 40 MEQ: 1500 TABLET, EXTENDED RELEASE ORAL at 09:28

## 2025-02-08 RX ADMIN — PANTOPRAZOLE SODIUM 40 MG: 40 TABLET, DELAYED RELEASE ORAL at 08:05

## 2025-02-08 RX ADMIN — IPRATROPIUM BROMIDE AND ALBUTEROL SULFATE 3 ML: 2.5; .5 SOLUTION RESPIRATORY (INHALATION) at 09:52

## 2025-02-08 RX ADMIN — FLUTICASONE FUROATE AND VILANTEROL TRIFENATATE 1 PUFF: 200; 25 POWDER RESPIRATORY (INHALATION) at 06:40

## 2025-02-08 RX ADMIN — GUAIFENESIN 1200 MG: 600 TABLET ORAL at 06:39

## 2025-02-08 ASSESSMENT — COGNITIVE AND FUNCTIONAL STATUS - GENERAL
MOBILITY SCORE: 24
DAILY ACTIVITIY SCORE: 24

## 2025-02-08 ASSESSMENT — PAIN SCALES - GENERAL
PAINLEVEL_OUTOF10: 0 - NO PAIN

## 2025-02-08 ASSESSMENT — PAIN - FUNCTIONAL ASSESSMENT
PAIN_FUNCTIONAL_ASSESSMENT: 0-10
PAIN_FUNCTIONAL_ASSESSMENT: 0-10

## 2025-02-08 NOTE — CARE PLAN
The patient's goals for the shift include  no respiratory issues    The clinical goals for the shift include patient will spo2 will remain above 93%      Problem: Respiratory  Goal: Clear secretions with interventions this shift  Outcome: Progressing     Problem: Respiratory  Goal: No signs of respiratory distress (eg. Use of accessory muscles. Peds grunting)  Outcome: Progressing     Problem: Respiratory  Goal: Tolerate mechanical ventilation evidenced by VS/agitation level this shift  Outcome: Progressing     Problem: Respiratory  Goal: Tolerate mechanical ventilation evidenced by VS/agitation level this shift  Outcome: Progressing     Problem: Respiratory  Goal: Verbalize decreased shortness of breath this shift  Outcome: Progressing   .

## 2025-02-08 NOTE — DISCHARGE SUMMARY
Discharge Diagnosis  Mild intermittent asthma with exacerbation ([facility])    Issues Requiring Follow-Up  -Follow-up with primary care provider within the next 1 week.  Recommend to repeat renal function panel, magnesium level with PCP within the next 1 week.  -Follow-up with pulmonologist in 1 week as well.    Discharge Meds     Medication List      START taking these medications     guaiFENesin 1,200 mg tablet extended release 12hr; Commonly known as:   Mucinex; Take 1 tablet (1,200 mg) by mouth 2 times a day as needed (cough   and congestion) for up to 10 doses. Do not crush, chew, or split.   oseltamivir 75 mg capsule; Commonly known as: Tamiflu; Take 1 capsule   (75 mg) by mouth 2 times a day for 5 doses.   predniSONE 20 mg tablet; Commonly known as: Deltasone; Take 2 tablets   (40 mg) by mouth once daily for 5 doses. Do not fill before February 9, 2025.; Start taking on: February 9, 2025     CHANGE how you take these medications     Advair -21 mcg/actuation inhaler; Generic drug: fluticasone   propion-salmeteroL; INHALE 2 PUFFS BY MOUTH EVERY 12 HOURS IN THE MORNING   AND IN THE EVENING; What changed: Another medication with the same name   was removed. Continue taking this medication, and follow the directions   you see here.     CONTINUE taking these medications     albuterol 90 mcg/actuation inhaler; Commonly known as: Ventolin HFA;   Inhale 2 puffs every 4 hours if needed for wheezing or shortness of   breath.   clotrimazole 10 mg claire; Commonly known as: Mycelex   lactobacillus acidophilus capsule   pantoprazole 40 mg EC tablet; Commonly known as: ProtoNix; TAKE 1 TABLET   BY MOUTH EVERY DAY   tretinoin 0.05 % cream; Commonly known as: Retin-A   urea 40 % lotion     STOP taking these medications     acyclovir 400 mg tablet; Commonly known as: Zovirax   budesonide-formoteroL 160-4.5 mcg/actuation inhaler; Commonly known as:   Symbicort   estradiol 0.01 % (0.1 mg/gram) vaginal cream; Commonly  known as: Estrace   ibuprofen 200 mg tablet   nystatin 100,000 unit/gram powder; Commonly known as: Mycostatin     ASK your doctor about these medications     EPINEPHrine 0.3 mg/0.3 mL injection syringe; Commonly known as: Epipen;   Inject 0.3 mL (0.3 mg) into the muscle 1 time if needed for anaphylaxis   for up to 1 dose. Inject into upper leg. Call 911 after use.       Test Results Pending At Discharge  Pending Labs       No current pending labs.            Hospital Course  Jenn Antunez is a 70 y.o. female with past medical history of asthma, AILYN, GERD, admitted to the hospital secondary to influenza A with asthma exacerbation.      Influenza A with asthma exacerbation  -Chest x-ray with no acute cardiopulmonary process.   -Patient was placed on Tamiflu, steroids, nebs and Breo.  Patient clinically improved during her hospital stay.  On the day of discharge, patient is on room air and reports breathing is better and denies chest pain, shortness of breath, nausea, vomiting or diarrhea.  Patient reports that she feels ready to be discharged home.  On exam, patient still has some scattered wheezes but has improved overall.  We will discharge patient on Tamiflu to complete a 5-day course along with a short course of prednisone for few more days and continue patient's home medication of Advair and albuterol as needed.  Will have patient follow-up with PCP and pulmonologist as an outpatient.  I advised patient come to the ER if any problems arise and she voiced understanding.      Hypophosphatemia  -Replaced.  Recommend to repeat phosphorus level on follow-up with primary care provider within the next 1 week.     Hypomagnesemia  -Replaced.  Recommend to repeat magnesium level on follow-up with primary care fighter within the next 1 week.      Hypokalemia  -Potassium was supplemented on the day of discharge.  Recommend to repeat potassium level on follow-up with primary care provider within the next 1 week.      GERD  -Continue PPI and monitor with PCP as an outpatient.     Time spent on discharge approximately 35 minutes.    Pertinent Physical Exam At Time of Discharge  General: Alert.  No acute distress.  HEENT: Sclera clear.  CVS: RRR.   Lungs: Mildly diminished breath sounds with some scattered wheezing bilaterally.    Abdomen: Soft.  Nontender.  Bowel sounds present.    Extremities: No pitting edema bilat ankles.  Psychiatric: Cooperative.       Outpatient Follow-Up  Future Appointments   Date Time Provider Department Center   3/10/2025  8:45 AM DO ERNST ALLERGY DANTE COLLADO MA [id number] [facility]   4/9/2025 11:00 AM Nubia Em MD [id number] [facility]   4/10/2025 11:00 AM Valerie Houston DO [id number] [facility]   4/29/2025  2:00 PM Lupe Martino MD [id number] [facility]   6/16/2025  9:00 AM Shellie Álvarez MD [id number] [facility]         Sheldon Devi DO

## 2025-02-08 NOTE — CARE PLAN
The patient's goals for the shift include to remain comfortable    The clinical goals for the shift include patient will spo2 will remain above 93%    Over the shift, the patient did not make progress toward the following goals. Barriers to progression include. Recommendations to address these barriers include.

## 2025-02-08 NOTE — DISCHARGE INSTRUCTIONS
-Follow-up with primary care provider within the next 1 week.  -Follow-up with pulmonologist in 1 week.

## 2025-02-10 ENCOUNTER — APPOINTMENT (OUTPATIENT)
Dept: ALLERGY | Facility: CLINIC | Age: 70
End: 2025-02-10
Payer: MEDICARE

## 2025-02-10 ENCOUNTER — PATIENT OUTREACH (OUTPATIENT)
Dept: PRIMARY CARE | Facility: CLINIC | Age: 70
End: 2025-02-10

## 2025-02-10 ENCOUNTER — TELEPHONE (OUTPATIENT)
Dept: PRIMARY CARE | Facility: CLINIC | Age: 70
End: 2025-02-10

## 2025-02-10 NOTE — PROGRESS NOTES
Discharge Facility: [facility]     Discharge Diagnosis:    Mild intermittent asthma with exacerbation ([facility]-H[facility])     Issues Requiring Follow-Up  -Follow-up with primary care provider within the next 1 week.  Recommend to repeat renal function panel, magnesium level with PCP within the next 1 week.  -Follow-up with pulmonologist in 1 week as well.      Admission Date: 2/6/2025   Discharge Date:  2/8/2025     PCP Appointment Date:  one week F/U tasked to office per hospital request       Specialist Appointment Date:     Dr Martino Pulmonary / pt in process of calling for a sooner F/U     Referral to Pulmonology Authorized      Hospital Encounter and Summary Linked: Yes    ED to Hosp-Admission (Discharged) with Sheldon Devi DO; Juwan Knxo MD (02/06/2025)     See discharge assessment below for further details     Engagement  Call Start Time: 1146 (2/10/2025 11:46 AM)    Medications  Medications reviewed with patient/caregiver?: Yes (2/10/2025 11:46 AM)  Is the patient having any side effects they believe may be caused by any medication additions or changes?: No (2/10/2025 11:46 AM)  Does the patient have all medications ordered at discharge?: Yes (2/10/2025 11:46 AM)  Care Management Interventions: No intervention needed (2/10/2025 11:46 AM)  Prescription Comments: Patient states only has 5 pills of Shannon Flu , RX states  take 2Xday 5 days (2/10/2025 11:46 AM)  Is the patient taking all medications as directed (includes completed medication regime)?: -- (Pt states has all other meds except Presinsone will  ASAP) (2/10/2025 11:46 AM)  Care Management Interventions: Provided patient education (2/10/2025 11:46 AM)  Medication Comments: START taking these medications     guaiFENesin 1,200 mg tablet extended release 12hr; Commonly known as:   Mucinex; Take 1 tablet (1,200 mg) by mouth 2 times a day as needed (cough   and congestion) for up to 10 doses. Do not crush, chew, or split.   oseltamivir 75 mg capsule;  Commonly known as: Tamiflu; Take 1 capsule   (75 mg) by mouth 2 times a day for 5 doses.   predniSONE 20 mg tablet; Commonly known as: Deltasone; Take 2 tablets   (40 mg) by mouth once daily for 5 doses. Do not fill before February 9, 2025.; Start taking on: February 9, 2025     CHANGE how you take these medications     Advair -21 mcg/actuation inhaler; Generic drug: fluticasone   propion-salmeteroL; INHALE 2 PUFFS BY MOUTH EVERY 12 HOURS IN THE MORNING   AND IN THE EVENING; What changed: Another medication with the same name   was removed. Continue taking this medication, and follow the directions   you see here. ,TOP taking these medications     acyclovir 400 mg tablet; Commonly known as: Zovirax   budesonide-formoteroL 160-4.5 mcg/actuation inhaler; Commonly known as:   Symbicort   estradiol 0.01 % (0.1 mg/gram) vaginal cream; Commonly known as: Estrace   ibuprofen 200 mg tablet   nystatin 100,000 unit/gram powder; Commonly known as: Mycostatin     ASK your doctor about these medications     EPINEPHrine 0.3 mg/0.3 mL injection syringe; Commonly known as: Epipen;   Inject 0.3 mL (0.3 mg) into the muscle 1 time if needed for anaphylaxis   for up to 1 dose. Inject into upper leg. Call 911 after use. (2/10/2025 11:46 AM)    Appointments  Does the patient have a primary care provider?: Yes (2/10/2025 11:46 AM)  Care Management Interventions: Educated patient on importance of making appointment (2/10/2025 11:46 AM)  Care Management Interventions: Advised to schedule with specialist (2/10/2025 11:46 AM)    Self Management  What is the home health agency?: NA (2/10/2025 11:46 AM)  What Durable Medical Equipment (DME) was ordered?: NA (2/10/2025 11:46 AM)    Patient Teaching  Does the patient have access to their discharge instructions?: Yes (2/10/2025 11:46 AM)  Care Management Interventions: Reviewed instructions with patient (2/10/2025 11:46 AM)  What is the patient's perception of their health status since  discharge?: Improving (2/10/2025 11:46 AM)  Is the patient/caregiver able to teach back the hierarchy of who to call/visit for symptoms/problems? PCP, Specialist, Home Health nurse, Urgent Care, ED, 911: Yes (2/10/2025 11:46 AM)  Patient/Caregiver Education Comments: I sent message to PCP office as I see no openings for one week F/U per hospital request. I will F/U with patient as she states only has 5 pills of Shannon F/U RX states 2Xday for 5 days  DC summary states 2Xday X 5 doses. . Patient encouraged to  Prednisone asap from pharm. patient states she is calling pulmonary to try to obtain a sooner appt. than  bozena in March. Patient encouraged  to call providers for any questions concerns or change in condition. Patient had in coming  call from  Pulmonary office had to take call. (2/10/2025 11:46 AM)

## 2025-02-10 NOTE — TELEPHONE ENCOUNTER
Patient was seen in ER over the weekend diagnosed with Flu A which aggravated her asthma.  They told her to make follow up appt here.  She said she is feeling somewhat better its just the asthma.  She does have a pulmonologist too which they said to follow up with as well

## 2025-02-11 ENCOUNTER — TELEMEDICINE (OUTPATIENT)
Dept: PRIMARY CARE | Facility: CLINIC | Age: 70
End: 2025-02-11
Payer: MEDICARE

## 2025-02-11 ENCOUNTER — PATIENT OUTREACH (OUTPATIENT)
Dept: PRIMARY CARE | Facility: CLINIC | Age: 70
End: 2025-02-11

## 2025-02-11 DIAGNOSIS — Z09 HOSPITAL DISCHARGE FOLLOW-UP: ICD-10-CM

## 2025-02-11 DIAGNOSIS — J45.21 MILD INTERMITTENT ASTHMA WITH EXACERBATION (HHS-HCC): ICD-10-CM

## 2025-02-11 DIAGNOSIS — E55.9 VITAMIN D DEFICIENCY: ICD-10-CM

## 2025-02-11 DIAGNOSIS — J11.1 FLU: Primary | ICD-10-CM

## 2025-02-11 LAB
ATRIAL RATE: 67 BPM
P AXIS: 44 DEGREES
P OFFSET: 179 MS
P ONSET: 133 MS
PR INTERVAL: 152 MS
Q ONSET: 209 MS
QRS COUNT: 11 BEATS
QRS DURATION: 92 MS
QT INTERVAL: 430 MS
QTC CALCULATION(BAZETT): 454 MS
QTC FREDERICIA: 446 MS
R AXIS: -46 DEGREES
T AXIS: 30 DEGREES
T OFFSET: 424 MS
VENTRICULAR RATE: 67 BPM

## 2025-02-11 PROCEDURE — 99496 TRANSJ CARE MGMT HIGH F2F 7D: CPT

## 2025-02-11 PROCEDURE — 1111F DSCHRG MED/CURRENT MED MERGE: CPT

## 2025-02-11 RX ORDER — OSELTAMIVIR PHOSPHATE 75 MG/1
75 CAPSULE ORAL 2 TIMES DAILY
Qty: 5 CAPSULE | Refills: 0 | Status: CANCELLED | OUTPATIENT
Start: 2025-02-11 | End: 2025-02-14

## 2025-02-11 NOTE — PROGRESS NOTES
Subjective   Patient ID: Jenn Antunez is a 70 y.o. female who presents for No chief complaint on file..    HPI   Patient is a 70-year-old female with past medical history of asthma who was admitted on February 6, 2025 at [facility] for influenza A leading to asthma exacerbation.  Patient was treated with Tamiflu, steroids, DuoNebs and Breo.  Her condition gradually improved and she was discharged home in stable condition on February 8, 2025 without requiring oxygen supplementation.  She was recommended to finish her Tamiflu and Medrol Dosepak of 5 days and follow-up with PCP and pulmonology, to monitor her BMP, including potassium and magnesium  Currently she is doing better admitting that her symptoms almost completely have resolved.    Review of Systems  All pertinent positive symptoms are included in the history of present illness.    All other systems have been reviewed and are negative and noncontributory to this patient's current ailments.    Objective   There were no vitals taken for this visit.    Physical Exam  CONSTITUTIONAL - well nourished, well developed, looks like stated age, in no acute distress, not ill-appearing, and not tired appearing  SKIN - normal skin color and pigmentation, normal skin turgor without rash, lesions, or nodules visualized  HEAD - no trauma, normocephalic  EYES - normal external exam  CHEST -no distressed breathing, good effort  EXTREMITIES - no edema, no deformities  NEUROLOGICAL - normal balance, normal motor, no ataxia  PSYCHIATRIC - alert, pleasant and cordial, age-appropriate    Assessment/Plan   Diagnoses and all orders for this visit:  Flu/asthma exacerbation  Hospital discharge follow-up  -     Basic metabolic panel; Future  -     Magnesium; Future  Patient is recovering well from her recent hospitalization.  Today she did not need to use her rescue inhaler  Placed order for BMP to check her potassium as well as magnesium level.  Instructed to eat food that contain  potassium including bananas and potatoes.  Will review all results and make treatment commendation accordingly  Recommend to follow with pulmonology for further management of her asthma    Vitamin D deficiency  -     Vitamin D 25-Hydroxy,Total (for eval of Vitamin D levels); Future  Patient has a history of vitamin D deficiency, ordered vitamin D level for monitoring  Meanwhile instructed to continue using over-the-counter vitamin D and calcium      Thank you for letting us be a part of your care team.  Please call the office if you have further questions or concerns regarding your care    Aura Pereira MD  PGY2, FM Resident

## 2025-02-11 NOTE — PROGRESS NOTES
I called patient to F/U states she spoke to PCP office was able to clarify Tamiflu. Patient states feeling better.

## 2025-02-11 NOTE — PROGRESS NOTES
I reviewed and examined the patient. I was present for the key exam elements, and I fully participated in the patient's care. I discussed the management of the care with the resident. I have personally reviewed the pertinent labs and imaging, as well as recent notes, with the patient. I have reviewed the note above and agree with the resident's medical decision making as documented in the resident's note, in addition to the following comments / findings:     Agree with the rest of the plan outlined below by resident physician. No red flags.      The patient understands and agrees to the assessment and plan of care. Patient has also agreed to follow up and comply with the treatment and evaluation as recommended today. Patient was instructed to call the office at [phone] should questions arise regarding their treatment or care.     Devin Novak DO, FAOASM  Family Medicine   [address], [address]  [place] [address]     Devin Novak DO

## 2025-02-12 NOTE — SIGNIFICANT EVENT
Follow Up Phone Call    Outgoing phone call    Spoke to: Jenn Antunez Relationship:self   Phone number: [phone]      Outcome: I left a message on answering machine   Chief Complaint   Patient presents with    Shortness of Breath    Flu Symptoms          Diagnosis:Not applicable

## 2025-02-13 LAB
25(OH)D3+25(OH)D2 SERPL-MCNC: 62 NG/ML (ref 30–100)
ANION GAP SERPL CALCULATED.4IONS-SCNC: 13 MMOL/L (CALC) (ref 7–17)
BUN SERPL-MCNC: 17 MG/DL (ref 7–25)
BUN/CREAT SERPL: ABNORMAL (CALC) (ref 6–22)
CALCIUM SERPL-MCNC: 9.2 MG/DL (ref 8.6–10.4)
CHLORIDE SERPL-SCNC: 105 MMOL/L (ref 98–110)
CO2 SERPL-SCNC: 24 MMOL/L (ref 20–32)
CREAT SERPL-MCNC: 0.76 MG/DL (ref 0.6–1)
EGFRCR SERPLBLD CKD-EPI 2021: 84 ML/MIN/1.73M2
GLUCOSE SERPL-MCNC: 115 MG/DL (ref 65–99)
MAGNESIUM SERPL-MCNC: 2.3 MG/DL (ref 1.5–2.5)
POTASSIUM SERPL-SCNC: 4.4 MMOL/L (ref 3.5–5.3)
SODIUM SERPL-SCNC: 142 MMOL/L (ref 135–146)

## 2025-02-14 ENCOUNTER — PATIENT OUTREACH (OUTPATIENT)
Dept: PRIMARY CARE | Facility: CLINIC | Age: 70
End: 2025-02-14
Payer: MEDICARE

## 2025-02-14 NOTE — PROGRESS NOTES
Call regarding appt. with PCP on 2/11/2025 after hospitalization.    At time of outreach call the patient feels as if their condition has improved  since last visit.    Reviewed the PCP appointment with the pt and addressed any questions or concerns, no questions or concerns at this time.     Encouraged patient to call providers for any questions concerns or change in condition    
no

## 2025-02-18 ENCOUNTER — CLINICAL SUPPORT (OUTPATIENT)
Dept: ALLERGY | Facility: CLINIC | Age: 70
End: 2025-02-18
Payer: MEDICARE

## 2025-02-18 DIAGNOSIS — J30.89 ALLERGIC RHINITIS DUE TO OTHER ALLERGIC TRIGGER, UNSPECIFIED SEASONALITY: ICD-10-CM

## 2025-02-18 PROCEDURE — 95117 IMMUNOTHERAPY INJECTIONS: CPT | Performed by: ALLERGY & IMMUNOLOGY

## 2025-02-21 ENCOUNTER — OFFICE VISIT (OUTPATIENT)
Dept: PULMONOLOGY | Facility: CLINIC | Age: 70
End: 2025-02-21
Payer: MEDICARE

## 2025-02-21 ENCOUNTER — HOSPITAL ENCOUNTER (OUTPATIENT)
Dept: RADIOLOGY | Facility: HOSPITAL | Age: 70
Discharge: HOME | End: 2025-02-21
Payer: MEDICARE

## 2025-02-21 VITALS
DIASTOLIC BLOOD PRESSURE: 78 MMHG | WEIGHT: 165.4 LBS | OXYGEN SATURATION: 98 % | SYSTOLIC BLOOD PRESSURE: 128 MMHG | RESPIRATION RATE: 16 BRPM | HEART RATE: 79 BPM | BODY MASS INDEX: 29.3 KG/M2

## 2025-02-21 DIAGNOSIS — J45.41 MODERATE PERSISTENT ASTHMA WITH EXACERBATION (HHS-HCC): ICD-10-CM

## 2025-02-21 DIAGNOSIS — R05.2 SUBACUTE COUGH: ICD-10-CM

## 2025-02-21 DIAGNOSIS — R05.2 SUBACUTE COUGH: Primary | ICD-10-CM

## 2025-02-21 DIAGNOSIS — J45.909 ASTHMA WITHOUT STATUS ASTHMATICUS WITHOUT COMPLICATION, UNSPECIFIED ASTHMA SEVERITY, UNSPECIFIED WHETHER PERSISTENT (HHS-HCC): ICD-10-CM

## 2025-02-21 PROCEDURE — 99214 OFFICE O/P EST MOD 30 MIN: CPT | Performed by: INTERNAL MEDICINE

## 2025-02-21 PROCEDURE — 1111F DSCHRG MED/CURRENT MED MERGE: CPT | Performed by: INTERNAL MEDICINE

## 2025-02-21 PROCEDURE — 99214 OFFICE O/P EST MOD 30 MIN: CPT | Mod: 25 | Performed by: INTERNAL MEDICINE

## 2025-02-21 PROCEDURE — 1126F AMNT PAIN NOTED NONE PRSNT: CPT | Performed by: INTERNAL MEDICINE

## 2025-02-21 PROCEDURE — 1036F TOBACCO NON-USER: CPT | Performed by: INTERNAL MEDICINE

## 2025-02-21 PROCEDURE — 1159F MED LIST DOCD IN RCRD: CPT | Performed by: INTERNAL MEDICINE

## 2025-02-21 PROCEDURE — 71046 X-RAY EXAM CHEST 2 VIEWS: CPT

## 2025-02-21 RX ORDER — ALBUTEROL SULFATE 90 UG/1
2 INHALANT RESPIRATORY (INHALATION) EVERY 4 HOURS PRN
Qty: 36 G | Refills: 11 | Status: SHIPPED | OUTPATIENT
Start: 2025-02-21 | End: 2026-02-21

## 2025-02-21 RX ORDER — LEVOFLOXACIN 250 MG/1
500 TABLET ORAL DAILY
Qty: 10 TABLET | Refills: 0 | Status: SHIPPED | OUTPATIENT
Start: 2025-02-21 | End: 2025-02-26

## 2025-02-21 RX ORDER — PREDNISONE 10 MG/1
TABLET ORAL
Qty: 30 TABLET | Refills: 0 | Status: SHIPPED | OUTPATIENT
Start: 2025-02-21 | End: 2025-03-23

## 2025-02-21 ASSESSMENT — PATIENT HEALTH QUESTIONNAIRE - PHQ9
2. FEELING DOWN, DEPRESSED OR HOPELESS: NOT AT ALL
1. LITTLE INTEREST OR PLEASURE IN DOING THINGS: NOT AT ALL
SUM OF ALL RESPONSES TO PHQ9 QUESTIONS 1 AND 2: 0

## 2025-02-21 ASSESSMENT — COLUMBIA-SUICIDE SEVERITY RATING SCALE - C-SSRS
6. HAVE YOU EVER DONE ANYTHING, STARTED TO DO ANYTHING, OR PREPARED TO DO ANYTHING TO END YOUR LIFE?: NO
1. IN THE PAST MONTH, HAVE YOU WISHED YOU WERE DEAD OR WISHED YOU COULD GO TO SLEEP AND NOT WAKE UP?: NO
2. HAVE YOU ACTUALLY HAD ANY THOUGHTS OF KILLING YOURSELF?: NO

## 2025-02-21 ASSESSMENT — PAIN SCALES - GENERAL: PAINLEVEL_OUTOF10: 0-NO PAIN

## 2025-02-21 NOTE — PROGRESS NOTES
Department of Medicine  Division of Pulmonary, Critical Care, and Sleep Medicine  Follow-Up Visit  [facility] - [address], [address]    Physician HPI (2/21/2025):  Pleasant 70 - year-old woman with history of allergic rhinitis/sinusitis, asthma presenting for follow-up.   Hospitalized 2 weeks ago for influenza A, still not feeling well, coughing with green phlegm, short of breath with chest tightness and wheezing.  No fever or chills, no hemoptysis.    Never smoked.        There is no immunization history on file for this patient.    Current Medications:  Current Outpatient Medications   Medication Instructions    albuterol (Ventolin HFA) 90 mcg/actuation inhaler 2 puffs, inhalation, Every 4 hours PRN    clotrimazole (MYCELEX) 10 mg, 4 times daily    fluticasone propion-salmeteroL (Advair HFA) 115-21 mcg/actuation inhaler 2 puffs, Every 12 hours    guaiFENesin (MUCINEX) 1,200 mg, oral, 2 times daily PRN, Do not crush, chew, or split.    lactobacillus acidophilus capsule Take by mouth.    pantoprazole (PROTONIX) 40 mg, oral, Daily    tretinoin (Retin-A) 0.05 % cream APPLY PEA SIZED AMOUNT TO FACE EVERY NIGHT    urea 40 % lotion APPLY TO AFFECTED AREAS EVERY DAY        Drug Allergies/Intolerances:  Allergies   Allergen Reactions    Azithromycin Unknown    Erythromycin Other    Nitrofurantoin Macrocrystal Other    Nitrofurantoin Macrocrystalline Unknown     Stomach upset    Nitrofurantoin Monohyd/M-Cryst Unknown    Sulfa (Sulfonamide Antibiotics) Swelling    Sulfur Swelling          Visit Vitals  /78   Pulse 79   Resp 16   Wt 75 kg (165 lb 6.4 oz)   SpO2 98%   BMI 29.30 kg/m²   OB Status Postmenopausal   Smoking Status Never   BSA 1.83 m²        Physical exam  Constitutional: Normal appearance.  HEENT: Normocephalic and atraumatic.  Cardiovascular: Normal rate and regular rhythm.  Pulmonary: Normal respiratory effort, bilateral clear breath sounds, no wheezing or rhonchi.  Musculoskeletal: No  "edema, no cyanosis.  Neurological: Awake, alert and oriented x3.  Psychiatric: Normal behavior, mood and affect.      Pulmonary Function Test Results     Pulmonary Functions Testing Results:    No results found for: \"FEV1\", \"FVC\", \"HHH9HFV\", \"TLC\", \"DLCO\"      Chest Radiograph     XR chest 1 view 09/19/2024    Narrative  Interpreted By:  Jacky Hamilton,  STUDY:  XR CHEST 1 VIEW;  9/19/2024 10:38 pm    INDICATION:  Signs/Symptoms:RUQ abd pain.      COMPARISON:  10/18/2023    ACCESSION NUMBER(S):  [id number]    ORDERING CLINICIAN:  SILVANO JACOBSEN    FINDINGS:          CARDIOMEDIASTINAL SILHOUETTE:  Cardiomediastinal silhouette is normal in size and configuration.    LUNGS:  Lungs are clear.    ABDOMEN:  No remarkable upper abdominal findings.    BONES:  No acute osseous changes.    Impression  1.  No evidence of acute cardiopulmonary process.        MACRO:  None    Signed by: Jacky Hamilton 9/19/2024 10:43 PM  Dictation workstation:   [id number]      Echocardiogram     No results found for this or any previous visit from the past 365 days.       Chest CT Scan     No results found for this or any previous visit from the past 365 days.       Laboratory Studies     Lab Results   Component Value Date    WBC 8.3 02/08/2025    HGB 12.0 02/08/2025    HCT 36.8 02/08/2025    MCV 91 02/08/2025     02/08/2025      Lab Results   Component Value Date    GLUCOSE 115 (H) 02/13/2025    CALCIUM 9.2 02/13/2025     02/13/2025    K 4.4 02/13/2025    CO2 24 02/13/2025     02/13/2025    BUN 17 02/13/2025    CREATININE 0.76 02/13/2025      Lab Results   Component Value Date    ALT 43 02/06/2025    AST 31 02/06/2025    ALKPHOS 61 02/06/2025    BILITOT 0.3 02/06/2025        Sputum Culture     No results found for: \"AFBCX\"   No results found for: \"RESPCULTCYFI\"  No results found for the last 90 days.          Assessment and Plan / Recommendations     Pleasant 70-year-old woman with history of allergic " rhinitis/sinusitis, asthma presenting for worsening respiratory complaints.  Hospitalized 2 weeks ago and treated for influenza, reported worsening cough with green phlegm, dyspnea and chest tightness.    1.  Allergic rhinitis  2.  Asthma exacerbation  3.  Rule out pneumonia     -Start prednisone course  -Levaquin course  -Chest x-ray  -Continue Advair and albuterol  -Continue immunotherapy with immunology  -Return to clinic in 6-month or sooner with any concerns    This dictation was created using voice recognition software. Phonetic and/or minor grammatical errors may exist.    Lupe Martino MD   02/21/2025

## 2025-02-22 RX ORDER — DOXYCYCLINE 100 MG/1
100 TABLET ORAL 2 TIMES DAILY
Qty: 14 TABLET | Refills: 0 | Status: SHIPPED | OUTPATIENT
Start: 2025-02-22 | End: 2025-03-01

## 2025-02-26 DIAGNOSIS — J45.909 UNSPECIFIED ASTHMA, UNCOMPLICATED (HHS-HCC): ICD-10-CM

## 2025-02-26 DIAGNOSIS — J45.909 ASTHMA WITHOUT STATUS ASTHMATICUS WITHOUT COMPLICATION, UNSPECIFIED ASTHMA SEVERITY, UNSPECIFIED WHETHER PERSISTENT (HHS-HCC): ICD-10-CM

## 2025-02-26 RX ORDER — ALBUTEROL SULFATE 90 UG/1
2 INHALANT RESPIRATORY (INHALATION) EVERY 4 HOURS PRN
Qty: 36 G | Refills: 11 | Status: SHIPPED | OUTPATIENT
Start: 2025-02-26 | End: 2026-02-26

## 2025-02-26 RX ORDER — FLUTICASONE PROPIONATE AND SALMETEROL XINAFOATE 115; 21 UG/1; UG/1
2 AEROSOL, METERED RESPIRATORY (INHALATION) EVERY 12 HOURS
Qty: 12 G | Refills: 11 | Status: SHIPPED | OUTPATIENT
Start: 2025-02-26

## 2025-02-26 NOTE — PROGRESS NOTES
Primary Care Physician: Devin Novak DO   Date of Visit: 01/31/2025 10:30 AM EST  Type of Visit: New patient visit    Chief Complaint:  Numerous, see below    HPI  Jenn Antunez 70 y.o. female who presents to follow-up.    Denies any angina, shortness of breath, paroxysmal nocturnal dyspnea, orthopnea, peripheral edema, near-syncope, or syncope.  Still has occasional palpitations.    Review of Systems   12 point review of systems was obtained in detail and is negative other than that noted above or below.     Past Medical/Surgical History:  Jenn has a past medical history of Personal history of other diseases of the digestive system (09/20/2013).    Jenn has a past surgical history that includes Other surgical history (11/11/2022) and Other surgical history (11/11/2022).    Social/Family History:  Jenn reports that she has never smoked. She has never used smokeless tobacco. She reports that she does not currently use alcohol. She reports that she does not use drugs.    Jenn's family history includes Lung cancer in her father; m aunt breast cancer hx in an other family member.    Allergies:  Allergies   Allergen Reactions    Azithromycin Unknown    Erythromycin Other    Nitrofurantoin Macrocrystal Other    Nitrofurantoin Macrocrystalline Unknown     Stomach upset    Nitrofurantoin Monohyd/M-Cryst Unknown    Sulfa (Sulfonamide Antibiotics) Swelling    Sulfur Swelling       Medications:  Current Outpatient Medications   Medication Sig Dispense Refill    fluticasone propion-salmeteroL (Advair HFA) 115-21 mcg/actuation inhaler INHALE 2 PUFFS BY MOUTH EVERY 12 HOURS IN THE MORNING AND IN THE EVENING 12 g 11    lactobacillus acidophilus capsule Take by mouth.      pantoprazole (ProtoNix) 40 mg EC tablet TAKE 1 TABLET BY MOUTH EVERY DAY 90 tablet 0    tretinoin (Retin-A) 0.05 % cream APPLY PEA SIZED AMOUNT TO FACE EVERY NIGHT      urea 40 % lotion APPLY TO AFFECTED AREAS EVERY DAY      albuterol (Ventolin HFA)  90 mcg/actuation inhaler Inhale 2 puffs every 4 hours if needed for wheezing or shortness of breath. 36 g 11    clotrimazole (Mycelex) 10 mg claire Take 1 tablet (10 mg) by mouth 4 times a day.      doxycycline (Adoxa) 100 mg tablet Take 1 tablet (100 mg) by mouth 2 times a day for 7 days. Take with a full glass of water and do not lie down for at least 30 minutes after 14 tablet 0    guaiFENesin (Mucinex) 1,200 mg tablet extended release 12hr Take 1 tablet (1,200 mg) by mouth 2 times a day as needed (cough and congestion) for up to 10 doses. Do not crush, chew, or split. 10 tablet 0    levoFLOXacin (Levaquin) 250 mg tablet Take 2 tablets (500 mg) by mouth once daily for 5 days. 10 tablet 0    predniSONE (Deltasone) 10 mg tablet Take 4 tabs (40mg) daily for 3 days, then 3 tabs (30mg) daily for 3 days,  2 tabs (20mg) daily for 3 days,  1 tab (10 mg) daily for 3 days. 30 tablet 0     No current facility-administered medications for this visit.       Objective:   Vitals:    01/31/25 1033   BP: 120/80   Pulse: 80   SpO2: 97%       S1-S2 normal, regular rate and rhythm, no murmurs, rubs, or gallops, no carotid bruit  Clear to auscultation bilaterally    Labs and Imaging:      Latest Ref Rng & Units 3/21/2023     6:00 AM 5/22/2023     8:11 AM 3/6/2024    11:37 AM 9/19/2024    10:43 PM 2/6/2025    12:58 AM 2/7/2025     7:24 AM 2/8/2025     7:08 AM   Electrolytes   Na 136 - 145 mmol/L 139  140  142  141  141  143  141    K 3.5 - 5.3 mmol/L 4.2  4.2  4.2  3.7  3.5  4.0  3.4    Cl 98 - 107 mmol/L 102  104  104  106  103  108  105    CO2 21 - 32 mmol/L 23  28  30  25  23  24  25    Cr 0.50 - 1.05 mg/dL 0.6  0.56  0.63  0.60  0.77  0.49  0.58    Ca 8.6 - 10.3 mg/dL 9.5  9.3  9.3  8.7  9.1  8.9  8.0    Phos 2.5 - 4.9 mg/dL      2.4  2.5          Latest Ref Rng & Units 5/13/2023    11:10 AM 5/22/2023     8:11 AM 10/18/2023     8:40 AM 9/19/2024    10:43 PM 2/6/2025    12:58 AM 2/7/2025     7:24 AM 2/8/2025     7:08 AM   CBC   Hb  12.0 - 16.0 g/dL 13.1  12.6  12.6  12.5  12.9  12.3  12.0    Hct 36.0 - 46.0 % 39.6  40.1  39.1  37.5  39.0  36.4  36.8    MCV 80 - 100 fL 93.2  95  94  90  91  88  91    RDW 11.5 - 14.5 %  14.6  14.0  13.6  14.0  13.7  14.0          Latest Ref Rng & Units 7/10/2022    11:39 PM 3/21/2023     6:00 AM 5/22/2023     8:11 AM 10/18/2023     8:40 AM 3/6/2024    11:37 AM 9/19/2024    10:43 PM 2/6/2025    12:58 AM   Lipids   Chol 0 - 199 mg/dL  329   181       HDL mg/dL  64   45.1       LDL <=99 mg/dL  248   108       Trig 0 - 149 mg/dL  86   139       ALT 7 - 45 U/L 46  26  22   60  23  43    AST 9 - 39 U/L 27  26  18   41  17  31          Latest Ref Rng & Units 6/16/2018    11:00 AM 3/11/2020     7:40 PM 7/29/2021     8:47 AM 4/5/2022     4:58 PM 5/13/2023    11:10 AM 10/18/2023     8:40 AM 12/17/2024    11:56 AM   Thyroid   TSH 0.44 - 3.98 mIU/L 1.76  2.96  1.98  1.06  1.57  1.73  1.00           No data to display                 Lab Results   Component Value Date    HGBA1C 5.7 (H) 10/18/2023        The 10-year ASCVD risk score (Nuria DURAN, et al., 2019) is: 10.6%    Values used to calculate the score:      Age: 70 years      Sex: Female      Is Non- : No      Diabetic: No      Tobacco smoker: No      Systolic Blood Pressure: 128 mmHg      Is BP treated: No      HDL Cholesterol: 45.1 mg/dL      Total Cholesterol: 181 mg/dL     Echocardiogram: No results found for this or any previous visit.     Echocardiogram:   PHYSICIAN INTERPRETATION:  Left Ventricle: The left ventricular systolic function is normal, with an estimated ejection fraction of 65-70%. There are no regional wall motion abnormalities. The left ventricular cavity size is normal. There is mild left ventricular hypertrophy involving the basal wall and septal wall. Spectral Doppler shows an impaired relaxation pattern of left ventricular diastolic filling.  Left Atrium: The left atrium is upper limits of normal in size.  Right Ventricle:  The right ventricle is normal in size. There is normal right ventricular global systolic function.  Right Atrium: The right atrium is normal in size.  Aortic Valve: The aortic valve appears structurally normal. There is no evidence of aortic valve regurgitation. The peak instantaneous gradient of the aortic valve is 12.1 mmHg. The mean gradient of the aortic valve is 7.0 mmHg.  Mitral Valve: The mitral valve is normal in structure. There is trace to mild mitral valve regurgitation.  Tricuspid Valve: The tricuspid valve is structurally normal. There is mild tricuspid regurgitation.  Pulmonic Valve: The pulmonic valve is structurally normal. There is physiologic pulmonic valve regurgitation.  Pericardium: There is a trivial pericardial effusion posterior to the left ventricle.  Aorta: The aortic root is normal.  Pulmonary Artery: The tricuspid regurgitant velocity is 2.68 m/s, and with an estimated right atrial pressure of 3 mmHg, the estimated pulmonary artery pressure is normal with the RVSP at 28.4 mmHg.  Pulmonary Veins: The pulmonary veins appear normal and return normally to the left atrium.  Systemic Veins: The inferior vena cava appears to be of normal size.      CONCLUSIONS:  1. The left ventricular systolic function is normal with a 65-70% estimated ejection fraction.  2. Spectral Doppler shows an impaired relaxation pattern of left ventricular diastolic filling.      Stress Testing: No results found for this or any previous visit from the past 1825 days.    Cardiac Catheterization: No results found for this or any previous visit from the past 1825 days.    Cardiac Scoring: No results found for this or any previous visit from the past 1825 days.    AAA : No results found for this or any previous visit from the past 1825 days.    OTHER: No results found for this or any previous visit from the past 1825 days.        Assessment/Plan:   1. Obstructive sleep apnea syndrome  Referral to Adult Sleep Medicine            Jenn Antunez 70 y.o. female who presents to establish care:    1.  Palpitations    Reviewed the findings of her Holter monitor; no significant events.  Thyroid function also normal.  Echocardiogram with previously demonstrated a structurally normal heart.  Follow-up as needed.  ____________________________________________________________  Luis Goodson MD

## 2025-02-28 ENCOUNTER — APPOINTMENT (OUTPATIENT)
Dept: RADIOLOGY | Facility: HOSPITAL | Age: 70
End: 2025-02-28
Payer: MEDICARE

## 2025-02-28 ENCOUNTER — HOSPITAL ENCOUNTER (OUTPATIENT)
Facility: HOSPITAL | Age: 70
Setting detail: OBSERVATION
Discharge: HOME | End: 2025-03-01
Attending: STUDENT IN AN ORGANIZED HEALTH CARE EDUCATION/TRAINING PROGRAM | Admitting: INTERNAL MEDICINE
Payer: MEDICARE

## 2025-02-28 ENCOUNTER — APPOINTMENT (OUTPATIENT)
Dept: PRIMARY CARE | Facility: CLINIC | Age: 70
End: 2025-02-28
Payer: MEDICARE

## 2025-02-28 ENCOUNTER — APPOINTMENT (OUTPATIENT)
Dept: CARDIOLOGY | Facility: HOSPITAL | Age: 70
End: 2025-02-28
Payer: MEDICARE

## 2025-02-28 DIAGNOSIS — R05.9 PAIN AGGRAVATED BY COUGHING: ICD-10-CM

## 2025-02-28 DIAGNOSIS — R05.3 PERSISTENT COUGH FOR 3 WEEKS OR LONGER: ICD-10-CM

## 2025-02-28 DIAGNOSIS — M62.830 MUSCLE SPASM OF BACK: ICD-10-CM

## 2025-02-28 DIAGNOSIS — R07.9 CHEST PAIN, UNSPECIFIED TYPE: Primary | ICD-10-CM

## 2025-02-28 DIAGNOSIS — B37.0: ICD-10-CM

## 2025-02-28 DIAGNOSIS — R52 PAIN AGGRAVATED BY COUGHING: ICD-10-CM

## 2025-02-28 LAB
ALBUMIN SERPL BCP-MCNC: 4.2 G/DL (ref 3.4–5)
ALP SERPL-CCNC: 71 U/L (ref 33–136)
ALT SERPL W P-5'-P-CCNC: 32 U/L (ref 7–45)
ANION GAP SERPL CALC-SCNC: 14 MMOL/L (ref 10–20)
AORTIC VALVE MEAN GRADIENT: 6 MMHG
AORTIC VALVE PEAK VELOCITY: 1.69 M/S
APPEARANCE UR: CLEAR
APTT PPP: 27 SECONDS (ref 26–36)
AST SERPL W P-5'-P-CCNC: 15 U/L (ref 9–39)
ATRIAL RATE: 61 BPM
ATRIAL RATE: 67 BPM
ATRIAL RATE: 68 BPM
AV PEAK GRADIENT: 11 MMHG
AVA (PEAK VEL): 2.32 CM2
AVA (VTI): 2.38 CM2
BASOPHILS # BLD AUTO: 0.02 X10*3/UL (ref 0–0.1)
BASOPHILS NFR BLD AUTO: 0.2 %
BILIRUB SERPL-MCNC: 0.4 MG/DL (ref 0–1.2)
BILIRUB UR STRIP.AUTO-MCNC: NEGATIVE MG/DL
BUN SERPL-MCNC: 28 MG/DL (ref 6–23)
CALCIUM SERPL-MCNC: 9.2 MG/DL (ref 8.6–10.3)
CARDIAC TROPONIN I PNL SERPL HS: 3 NG/L (ref 0–13)
CARDIAC TROPONIN I PNL SERPL HS: 3 NG/L (ref 0–13)
CHLORIDE SERPL-SCNC: 103 MMOL/L (ref 98–107)
CHOLEST SERPL-MCNC: 193 MG/DL (ref 0–199)
CHOLESTEROL/HDL RATIO: 3.3
CO2 SERPL-SCNC: 27 MMOL/L (ref 21–32)
COLOR UR: ABNORMAL
CREAT SERPL-MCNC: 0.77 MG/DL (ref 0.5–1.05)
EGFRCR SERPLBLD CKD-EPI 2021: 83 ML/MIN/1.73M*2
EJECTION FRACTION APICAL 4 CHAMBER: 58.1
EJECTION FRACTION: 58 %
EOSINOPHIL # BLD AUTO: 0.06 X10*3/UL (ref 0–0.4)
EOSINOPHIL NFR BLD AUTO: 0.6 %
ERYTHROCYTE [DISTWIDTH] IN BLOOD BY AUTOMATED COUNT: 14.7 % (ref 11.5–14.5)
EST. AVERAGE GLUCOSE BLD GHB EST-MCNC: 120 MG/DL
FLUAV RNA RESP QL NAA+PROBE: NOT DETECTED
FLUBV RNA RESP QL NAA+PROBE: NOT DETECTED
GLUCOSE SERPL-MCNC: 111 MG/DL (ref 74–99)
GLUCOSE UR STRIP.AUTO-MCNC: NORMAL MG/DL
HBA1C MFR BLD: 5.8 %
HCT VFR BLD AUTO: 40.1 % (ref 36–46)
HDLC SERPL-MCNC: 58.6 MG/DL
HGB BLD-MCNC: 13.5 G/DL (ref 12–16)
HOLD SPECIMEN: NORMAL
IMM GRANULOCYTES # BLD AUTO: 0.13 X10*3/UL (ref 0–0.5)
IMM GRANULOCYTES NFR BLD AUTO: 1.3 % (ref 0–0.9)
INR PPP: 1 (ref 0.9–1.1)
INR PPP: 1 (ref 0.9–1.1)
KETONES UR STRIP.AUTO-MCNC: NEGATIVE MG/DL
LDLC SERPL CALC-MCNC: 106 MG/DL
LEFT ATRIUM VOLUME AREA LENGTH INDEX BSA: 19 ML/M2
LEFT VENTRICLE INTERNAL DIMENSION DIASTOLE: 4.75 CM (ref 3.5–6)
LEFT VENTRICULAR OUTFLOW TRACT DIAMETER: 1.99 CM
LEUKOCYTE ESTERASE UR QL STRIP.AUTO: NEGATIVE
LIPASE SERPL-CCNC: 18 U/L (ref 9–82)
LYMPHOCYTES # BLD AUTO: 2.8 X10*3/UL (ref 0.8–3)
LYMPHOCYTES NFR BLD AUTO: 29 %
MAGNESIUM SERPL-MCNC: 2.04 MG/DL (ref 1.6–2.4)
MCH RBC QN AUTO: 30.1 PG (ref 26–34)
MCHC RBC AUTO-ENTMCNC: 33.7 G/DL (ref 32–36)
MCV RBC AUTO: 89 FL (ref 80–100)
MITRAL VALVE E/A RATIO: 0.7
MONOCYTES # BLD AUTO: 0.9 X10*3/UL (ref 0.05–0.8)
MONOCYTES NFR BLD AUTO: 9.3 %
MUCOUS THREADS #/AREA URNS AUTO: NORMAL /LPF
NEUTROPHILS # BLD AUTO: 5.76 X10*3/UL (ref 1.6–5.5)
NEUTROPHILS NFR BLD AUTO: 59.6 %
NITRITE UR QL STRIP.AUTO: NEGATIVE
NON HDL CHOLESTEROL: 134 MG/DL (ref 0–149)
NRBC BLD-RTO: 0 /100 WBCS (ref 0–0)
P AXIS: 40 DEGREES
P AXIS: 43 DEGREES
P AXIS: 48 DEGREES
P OFFSET: 183 MS
P OFFSET: 185 MS
P OFFSET: 187 MS
P ONSET: 134 MS
P ONSET: 135 MS
P ONSET: 136 MS
PH UR STRIP.AUTO: 6.5 [PH]
PLATELET # BLD AUTO: 312 X10*3/UL (ref 150–450)
POTASSIUM SERPL-SCNC: 3.9 MMOL/L (ref 3.5–5.3)
PR INTERVAL: 148 MS
PR INTERVAL: 148 MS
PR INTERVAL: 150 MS
PROT SERPL-MCNC: 6.9 G/DL (ref 6.4–8.2)
PROT UR STRIP.AUTO-MCNC: ABNORMAL MG/DL
PROTHROMBIN TIME: 10.7 SECONDS (ref 9.8–12.4)
PROTHROMBIN TIME: 10.7 SECONDS (ref 9.8–12.4)
Q ONSET: 209 MS
Q ONSET: 209 MS
Q ONSET: 210 MS
QRS COUNT: 10 BEATS
QRS COUNT: 11 BEATS
QRS COUNT: 11 BEATS
QRS DURATION: 84 MS
QRS DURATION: 84 MS
QRS DURATION: 88 MS
QT INTERVAL: 420 MS
QT INTERVAL: 424 MS
QT INTERVAL: 426 MS
QTC CALCULATION(BAZETT): 428 MS
QTC CALCULATION(BAZETT): 446 MS
QTC CALCULATION(BAZETT): 448 MS
QTC FREDERICIA: 428 MS
QTC FREDERICIA: 438 MS
QTC FREDERICIA: 440 MS
R AXIS: -27 DEGREES
R AXIS: -27 DEGREES
R AXIS: -30 DEGREES
RBC # BLD AUTO: 4.49 X10*6/UL (ref 4–5.2)
RBC # UR STRIP.AUTO: NEGATIVE MG/DL
RBC #/AREA URNS AUTO: NORMAL /HPF
RIGHT VENTRICLE FREE WALL PEAK S': 16 CM/S
RIGHT VENTRICLE PEAK SYSTOLIC PRESSURE: 24.1 MMHG
SARS-COV-2 RNA RESP QL NAA+PROBE: NOT DETECTED
SODIUM SERPL-SCNC: 140 MMOL/L (ref 136–145)
SP GR UR STRIP.AUTO: >1.05
SQUAMOUS #/AREA URNS AUTO: NORMAL /HPF
T AXIS: 15 DEGREES
T AXIS: 35 DEGREES
T AXIS: 38 DEGREES
T OFFSET: 419 MS
T OFFSET: 421 MS
T OFFSET: 423 MS
TRICUSPID ANNULAR PLANE SYSTOLIC EXCURSION: 2.4 CM
TRIGL SERPL-MCNC: 141 MG/DL (ref 0–149)
UROBILINOGEN UR STRIP.AUTO-MCNC: NORMAL MG/DL
VENTRICULAR RATE: 61 BPM
VENTRICULAR RATE: 67 BPM
VENTRICULAR RATE: 68 BPM
VLDL: 28 MG/DL (ref 0–40)
WBC # BLD AUTO: 9.7 X10*3/UL (ref 4.4–11.3)
WBC #/AREA URNS AUTO: NORMAL /HPF

## 2025-02-28 PROCEDURE — 71045 X-RAY EXAM CHEST 1 VIEW: CPT | Mod: FOREIGN READ | Performed by: RADIOLOGY

## 2025-02-28 PROCEDURE — 36415 COLL VENOUS BLD VENIPUNCTURE: CPT | Performed by: STUDENT IN AN ORGANIZED HEALTH CARE EDUCATION/TRAINING PROGRAM

## 2025-02-28 PROCEDURE — 93017 CV STRESS TEST TRACING ONLY: CPT

## 2025-02-28 PROCEDURE — 3430000001 HC RX 343 DIAGNOSTIC RADIOPHARMACEUTICALS: Performed by: INTERNAL MEDICINE

## 2025-02-28 PROCEDURE — 83735 ASSAY OF MAGNESIUM: CPT | Performed by: STUDENT IN AN ORGANIZED HEALTH CARE EDUCATION/TRAINING PROGRAM

## 2025-02-28 PROCEDURE — 93005 ELECTROCARDIOGRAM TRACING: CPT

## 2025-02-28 PROCEDURE — 85610 PROTHROMBIN TIME: CPT | Performed by: STUDENT IN AN ORGANIZED HEALTH CARE EDUCATION/TRAINING PROGRAM

## 2025-02-28 PROCEDURE — 2500000002 HC RX 250 W HCPCS SELF ADMINISTERED DRUGS (ALT 637 FOR MEDICARE OP, ALT 636 FOR OP/ED): Performed by: INTERNAL MEDICINE

## 2025-02-28 PROCEDURE — 71045 X-RAY EXAM CHEST 1 VIEW: CPT

## 2025-02-28 PROCEDURE — 2500000001 HC RX 250 WO HCPCS SELF ADMINISTERED DRUGS (ALT 637 FOR MEDICARE OP): Performed by: INTERNAL MEDICINE

## 2025-02-28 PROCEDURE — 99223 1ST HOSP IP/OBS HIGH 75: CPT | Performed by: INTERNAL MEDICINE

## 2025-02-28 PROCEDURE — 71275 CT ANGIOGRAPHY CHEST: CPT

## 2025-02-28 PROCEDURE — 78452 HT MUSCLE IMAGE SPECT MULT: CPT | Performed by: RADIOLOGY

## 2025-02-28 PROCEDURE — 93306 TTE W/DOPPLER COMPLETE: CPT | Performed by: STUDENT IN AN ORGANIZED HEALTH CARE EDUCATION/TRAINING PROGRAM

## 2025-02-28 PROCEDURE — 2500000004 HC RX 250 GENERAL PHARMACY W/ HCPCS (ALT 636 FOR OP/ED): Performed by: INTERNAL MEDICINE

## 2025-02-28 PROCEDURE — 93306 TTE W/DOPPLER COMPLETE: CPT

## 2025-02-28 PROCEDURE — G0378 HOSPITAL OBSERVATION PER HR: HCPCS

## 2025-02-28 PROCEDURE — 96374 THER/PROPH/DIAG INJ IV PUSH: CPT | Mod: 59

## 2025-02-28 PROCEDURE — 99285 EMERGENCY DEPT VISIT HI MDM: CPT | Mod: 25 | Performed by: STUDENT IN AN ORGANIZED HEALTH CARE EDUCATION/TRAINING PROGRAM

## 2025-02-28 PROCEDURE — 93016 CV STRESS TEST SUPVJ ONLY: CPT | Performed by: STUDENT IN AN ORGANIZED HEALTH CARE EDUCATION/TRAINING PROGRAM

## 2025-02-28 PROCEDURE — 80053 COMPREHEN METABOLIC PANEL: CPT | Performed by: STUDENT IN AN ORGANIZED HEALTH CARE EDUCATION/TRAINING PROGRAM

## 2025-02-28 PROCEDURE — 94640 AIRWAY INHALATION TREATMENT: CPT

## 2025-02-28 PROCEDURE — 84484 ASSAY OF TROPONIN QUANT: CPT | Performed by: STUDENT IN AN ORGANIZED HEALTH CARE EDUCATION/TRAINING PROGRAM

## 2025-02-28 PROCEDURE — A9502 TC99M TETROFOSMIN: HCPCS | Performed by: INTERNAL MEDICINE

## 2025-02-28 PROCEDURE — 96372 THER/PROPH/DIAG INJ SC/IM: CPT | Performed by: INTERNAL MEDICINE

## 2025-02-28 PROCEDURE — 83718 ASSAY OF LIPOPROTEIN: CPT | Performed by: INTERNAL MEDICINE

## 2025-02-28 PROCEDURE — 78452 HT MUSCLE IMAGE SPECT MULT: CPT

## 2025-02-28 PROCEDURE — 81001 URINALYSIS AUTO W/SCOPE: CPT | Performed by: STUDENT IN AN ORGANIZED HEALTH CARE EDUCATION/TRAINING PROGRAM

## 2025-02-28 PROCEDURE — 71275 CT ANGIOGRAPHY CHEST: CPT | Performed by: RADIOLOGY

## 2025-02-28 PROCEDURE — 85610 PROTHROMBIN TIME: CPT | Performed by: INTERNAL MEDICINE

## 2025-02-28 PROCEDURE — 83036 HEMOGLOBIN GLYCOSYLATED A1C: CPT | Mod: GEALAB | Performed by: INTERNAL MEDICINE

## 2025-02-28 PROCEDURE — 93018 CV STRESS TEST I&R ONLY: CPT | Performed by: STUDENT IN AN ORGANIZED HEALTH CARE EDUCATION/TRAINING PROGRAM

## 2025-02-28 PROCEDURE — 83690 ASSAY OF LIPASE: CPT | Performed by: STUDENT IN AN ORGANIZED HEALTH CARE EDUCATION/TRAINING PROGRAM

## 2025-02-28 PROCEDURE — 74174 CTA ABD&PLVS W/CONTRAST: CPT | Performed by: RADIOLOGY

## 2025-02-28 PROCEDURE — 85025 COMPLETE CBC W/AUTO DIFF WBC: CPT | Performed by: STUDENT IN AN ORGANIZED HEALTH CARE EDUCATION/TRAINING PROGRAM

## 2025-02-28 PROCEDURE — 87636 SARSCOV2 & INF A&B AMP PRB: CPT | Performed by: STUDENT IN AN ORGANIZED HEALTH CARE EDUCATION/TRAINING PROGRAM

## 2025-02-28 PROCEDURE — 2550000001 HC RX 255 CONTRASTS: Performed by: STUDENT IN AN ORGANIZED HEALTH CARE EDUCATION/TRAINING PROGRAM

## 2025-02-28 PROCEDURE — 96375 TX/PRO/DX INJ NEW DRUG ADDON: CPT | Mod: 59

## 2025-02-28 RX ORDER — BENZONATATE 100 MG/1
200 CAPSULE ORAL 3 TIMES DAILY
Status: DISCONTINUED | OUTPATIENT
Start: 2025-02-28 | End: 2025-03-01 | Stop reason: HOSPADM

## 2025-02-28 RX ORDER — GUAIFENESIN/DEXTROMETHORPHAN 100-10MG/5
10 SYRUP ORAL EVERY 12 HOURS
Status: DISCONTINUED | OUTPATIENT
Start: 2025-02-28 | End: 2025-03-01 | Stop reason: HOSPADM

## 2025-02-28 RX ORDER — BIMATOPROST 3 UG/ML
1 SOLUTION TOPICAL NIGHTLY
Status: DISCONTINUED | OUTPATIENT
Start: 2025-02-28 | End: 2025-03-01 | Stop reason: HOSPADM

## 2025-02-28 RX ORDER — IPRATROPIUM BROMIDE AND ALBUTEROL SULFATE 2.5; .5 MG/3ML; MG/3ML
3 SOLUTION RESPIRATORY (INHALATION) EVERY 4 HOURS PRN
Status: DISCONTINUED | OUTPATIENT
Start: 2025-02-28 | End: 2025-02-28

## 2025-02-28 RX ORDER — NAPROXEN SODIUM 220 MG/1
81 TABLET, FILM COATED ORAL DAILY
Status: DISCONTINUED | OUTPATIENT
Start: 2025-03-01 | End: 2025-03-01 | Stop reason: HOSPADM

## 2025-02-28 RX ORDER — OLOPATADINE HYDROCHLORIDE 1 MG/ML
1 SOLUTION/ DROPS OPHTHALMIC 2 TIMES DAILY PRN
COMMUNITY

## 2025-02-28 RX ORDER — AMINOPHYLLINE 25 MG/ML
125 INJECTION, SOLUTION INTRAVENOUS AS NEEDED
Status: DISCONTINUED | OUTPATIENT
Start: 2025-02-28 | End: 2025-03-01 | Stop reason: HOSPADM

## 2025-02-28 RX ORDER — PANTOPRAZOLE SODIUM 40 MG/1
40 TABLET, DELAYED RELEASE ORAL
Status: DISCONTINUED | OUTPATIENT
Start: 2025-03-01 | End: 2025-03-01 | Stop reason: HOSPADM

## 2025-02-28 RX ORDER — ENOXAPARIN SODIUM 100 MG/ML
40 INJECTION SUBCUTANEOUS EVERY 24 HOURS
Status: DISCONTINUED | OUTPATIENT
Start: 2025-02-28 | End: 2025-03-01 | Stop reason: HOSPADM

## 2025-02-28 RX ORDER — ACETAMINOPHEN 650 MG/1
650 SUPPOSITORY RECTAL EVERY 4 HOURS PRN
Status: DISCONTINUED | OUTPATIENT
Start: 2025-02-28 | End: 2025-03-01 | Stop reason: HOSPADM

## 2025-02-28 RX ORDER — DOXYCYCLINE HYCLATE 100 MG
100 TABLET ORAL EVERY 12 HOURS SCHEDULED
Status: COMPLETED | OUTPATIENT
Start: 2025-02-28 | End: 2025-03-01

## 2025-02-28 RX ORDER — PANTOPRAZOLE SODIUM 40 MG/10ML
40 INJECTION, POWDER, LYOPHILIZED, FOR SOLUTION INTRAVENOUS
Status: DISCONTINUED | OUTPATIENT
Start: 2025-03-01 | End: 2025-03-01 | Stop reason: HOSPADM

## 2025-02-28 RX ORDER — FORMOTEROL FUMARATE 20 UG/2ML
20 SOLUTION RESPIRATORY (INHALATION)
Status: DISCONTINUED | OUTPATIENT
Start: 2025-02-28 | End: 2025-03-01 | Stop reason: HOSPADM

## 2025-02-28 RX ORDER — ACETAMINOPHEN 500 MG
5 TABLET ORAL NIGHTLY PRN
Status: DISCONTINUED | OUTPATIENT
Start: 2025-02-28 | End: 2025-03-01 | Stop reason: HOSPADM

## 2025-02-28 RX ORDER — PREDNISONE 5 MG/1
5 TABLET ORAL DAILY
Status: DISCONTINUED | OUTPATIENT
Start: 2025-03-03 | End: 2025-03-01 | Stop reason: HOSPADM

## 2025-02-28 RX ORDER — ACETAMINOPHEN 325 MG/1
650 TABLET ORAL EVERY 4 HOURS PRN
Status: DISCONTINUED | OUTPATIENT
Start: 2025-02-28 | End: 2025-03-01 | Stop reason: HOSPADM

## 2025-02-28 RX ORDER — BUDESONIDE 0.5 MG/2ML
0.5 INHALANT ORAL
Status: DISCONTINUED | OUTPATIENT
Start: 2025-02-28 | End: 2025-03-01

## 2025-02-28 RX ORDER — ACETAMINOPHEN 160 MG/5ML
650 SOLUTION ORAL EVERY 4 HOURS PRN
Status: DISCONTINUED | OUTPATIENT
Start: 2025-02-28 | End: 2025-03-01 | Stop reason: HOSPADM

## 2025-02-28 RX ORDER — ONDANSETRON HYDROCHLORIDE 2 MG/ML
4 INJECTION, SOLUTION INTRAVENOUS EVERY 8 HOURS PRN
Status: DISCONTINUED | OUTPATIENT
Start: 2025-02-28 | End: 2025-03-01 | Stop reason: HOSPADM

## 2025-02-28 RX ORDER — VIT C/E/ZN/COPPR/LUTEIN/ZEAXAN 250MG-90MG
25 CAPSULE ORAL DAILY
COMMUNITY

## 2025-02-28 RX ORDER — REGADENOSON 0.08 MG/ML
0.4 INJECTION, SOLUTION INTRAVENOUS ONCE
Status: COMPLETED | OUTPATIENT
Start: 2025-02-28 | End: 2025-02-28

## 2025-02-28 RX ORDER — POLYETHYLENE GLYCOL 3350 17 G/17G
17 POWDER, FOR SOLUTION ORAL 2 TIMES DAILY PRN
Status: DISCONTINUED | OUTPATIENT
Start: 2025-02-28 | End: 2025-03-01 | Stop reason: HOSPADM

## 2025-02-28 RX ORDER — FLUTICASONE FUROATE AND VILANTEROL 200; 25 UG/1; UG/1
1 POWDER RESPIRATORY (INHALATION)
Status: DISCONTINUED | OUTPATIENT
Start: 2025-03-01 | End: 2025-02-28

## 2025-02-28 RX ORDER — IPRATROPIUM BROMIDE AND ALBUTEROL SULFATE 2.5; .5 MG/3ML; MG/3ML
3 SOLUTION RESPIRATORY (INHALATION) EVERY 2 HOUR PRN
Status: DISCONTINUED | OUTPATIENT
Start: 2025-02-28 | End: 2025-03-01 | Stop reason: HOSPADM

## 2025-02-28 RX ORDER — NITROGLYCERIN 0.4 MG/1
0.4 TABLET SUBLINGUAL EVERY 5 MIN PRN
Status: DISCONTINUED | OUTPATIENT
Start: 2025-02-28 | End: 2025-03-01 | Stop reason: HOSPADM

## 2025-02-28 RX ORDER — IPRATROPIUM BROMIDE AND ALBUTEROL SULFATE 2.5; .5 MG/3ML; MG/3ML
3 SOLUTION RESPIRATORY (INHALATION)
Status: DISCONTINUED | OUTPATIENT
Start: 2025-02-28 | End: 2025-03-01 | Stop reason: HOSPADM

## 2025-02-28 RX ORDER — DOXYCYCLINE 100 MG/1
100 TABLET ORAL 2 TIMES DAILY
Status: DISCONTINUED | OUTPATIENT
Start: 2025-02-28 | End: 2025-02-28

## 2025-02-28 RX ORDER — BIMATOPROST 3 UG/ML
1 SOLUTION TOPICAL NIGHTLY
COMMUNITY

## 2025-02-28 RX ORDER — ALUMINUM HYDROXIDE, MAGNESIUM HYDROXIDE, AND SIMETHICONE 1200; 120; 1200 MG/30ML; MG/30ML; MG/30ML
30 SUSPENSION ORAL EVERY 6 HOURS PRN
Status: DISCONTINUED | OUTPATIENT
Start: 2025-02-28 | End: 2025-03-01 | Stop reason: HOSPADM

## 2025-02-28 RX ORDER — AMOXICILLIN 250 MG
2 CAPSULE ORAL 2 TIMES DAILY PRN
Status: DISCONTINUED | OUTPATIENT
Start: 2025-02-28 | End: 2025-03-01 | Stop reason: HOSPADM

## 2025-02-28 RX ORDER — PREDNISONE 10 MG/1
10 TABLET ORAL DAILY
Status: DISCONTINUED | OUTPATIENT
Start: 2025-02-28 | End: 2025-03-01 | Stop reason: HOSPADM

## 2025-02-28 RX ORDER — PROCHLORPERAZINE EDISYLATE 5 MG/ML
10 INJECTION INTRAMUSCULAR; INTRAVENOUS EVERY 6 HOURS PRN
Status: DISCONTINUED | OUTPATIENT
Start: 2025-02-28 | End: 2025-03-01 | Stop reason: HOSPADM

## 2025-02-28 RX ADMIN — PREDNISONE 10 MG: 10 TABLET ORAL at 14:54

## 2025-02-28 RX ADMIN — TETROFOSMIN 10.3 MILLICURIE: 0.23 INJECTION, POWDER, LYOPHILIZED, FOR SOLUTION INTRAVENOUS at 11:50

## 2025-02-28 RX ADMIN — REGADENOSON 0.4 MG: 0.08 INJECTION, SOLUTION INTRAVENOUS at 13:21

## 2025-02-28 RX ADMIN — FORMOTEROL FUMARATE DIHYDRATE 20 MCG: 20 SOLUTION RESPIRATORY (INHALATION) at 19:24

## 2025-02-28 RX ADMIN — ACETAMINOPHEN 650 MG: 325 TABLET ORAL at 20:55

## 2025-02-28 RX ADMIN — TETROFOSMIN 33.2 MILLICURIE: 0.23 INJECTION, POWDER, LYOPHILIZED, FOR SOLUTION INTRAVENOUS at 13:25

## 2025-02-28 RX ADMIN — DOXYCYCLINE HYCLATE 100 MG: 100 TABLET, COATED ORAL at 14:54

## 2025-02-28 RX ADMIN — IOHEXOL 87 ML: 350 INJECTION, SOLUTION INTRAVENOUS at 08:27

## 2025-02-28 RX ADMIN — ALUMINUM HYDROXIDE, MAGNESIUM HYDROXIDE, AND DIMETHICONE 30 ML: 200; 20; 200 SUSPENSION ORAL at 14:58

## 2025-02-28 RX ADMIN — GUAIFENESIN AND DEXTROMETHORPHAN 10 ML: 100; 10 SYRUP ORAL at 14:54

## 2025-02-28 RX ADMIN — ENOXAPARIN SODIUM 40 MG: 40 INJECTION SUBCUTANEOUS at 14:55

## 2025-02-28 RX ADMIN — BENZONATATE 200 MG: 100 CAPSULE ORAL at 14:54

## 2025-02-28 RX ADMIN — BENZONATATE 200 MG: 100 CAPSULE ORAL at 20:51

## 2025-02-28 RX ADMIN — BUDESONIDE 0.5 MG: 0.5 INHALANT RESPIRATORY (INHALATION) at 19:24

## 2025-02-28 RX ADMIN — AMINOPHYLLINE 125 MG: 25 INJECTION, SOLUTION INTRAVENOUS at 13:43

## 2025-02-28 RX ADMIN — IPRATROPIUM BROMIDE AND ALBUTEROL SULFATE 3 ML: .5; 3 SOLUTION RESPIRATORY (INHALATION) at 19:24

## 2025-02-28 SDOH — SOCIAL STABILITY: SOCIAL INSECURITY: WERE YOU ABLE TO COMPLETE ALL THE BEHAVIORAL HEALTH SCREENINGS?: YES

## 2025-02-28 SDOH — SOCIAL STABILITY: SOCIAL INSECURITY: WITHIN THE LAST YEAR, HAVE YOU BEEN AFRAID OF YOUR PARTNER OR EX-PARTNER?: NO

## 2025-02-28 SDOH — SOCIAL STABILITY: SOCIAL INSECURITY: DO YOU FEEL ANYONE HAS EXPLOITED OR TAKEN ADVANTAGE OF YOU FINANCIALLY OR OF YOUR PERSONAL PROPERTY?: NO

## 2025-02-28 SDOH — ECONOMIC STABILITY: FOOD INSECURITY: WITHIN THE PAST 12 MONTHS, THE FOOD YOU BOUGHT JUST DIDN'T LAST AND YOU DIDN'T HAVE MONEY TO GET MORE.: NEVER TRUE

## 2025-02-28 SDOH — ECONOMIC STABILITY: HOUSING INSECURITY: AT ANY TIME IN THE PAST 12 MONTHS, WERE YOU HOMELESS OR LIVING IN A SHELTER (INCLUDING NOW)?: NO

## 2025-02-28 SDOH — ECONOMIC STABILITY: INCOME INSECURITY: IN THE PAST 12 MONTHS HAS THE ELECTRIC, GAS, OIL, OR WATER COMPANY THREATENED TO SHUT OFF SERVICES IN YOUR HOME?: NO

## 2025-02-28 SDOH — SOCIAL STABILITY: SOCIAL INSECURITY: ARE THERE ANY APPARENT SIGNS OF INJURIES/BEHAVIORS THAT COULD BE RELATED TO ABUSE/NEGLECT?: NO

## 2025-02-28 SDOH — ECONOMIC STABILITY: FOOD INSECURITY: WITHIN THE PAST 12 MONTHS, YOU WORRIED THAT YOUR FOOD WOULD RUN OUT BEFORE YOU GOT THE MONEY TO BUY MORE.: NEVER TRUE

## 2025-02-28 SDOH — SOCIAL STABILITY: SOCIAL INSECURITY: HAS ANYONE EVER THREATENED TO HURT YOUR FAMILY OR YOUR PETS?: NO

## 2025-02-28 SDOH — SOCIAL STABILITY: SOCIAL INSECURITY: HAVE YOU HAD ANY THOUGHTS OF HARMING ANYONE ELSE?: NO

## 2025-02-28 SDOH — SOCIAL STABILITY: SOCIAL INSECURITY: WITHIN THE LAST YEAR, HAVE YOU BEEN HUMILIATED OR EMOTIONALLY ABUSED IN OTHER WAYS BY YOUR PARTNER OR EX-PARTNER?: NO

## 2025-02-28 SDOH — SOCIAL STABILITY: SOCIAL INSECURITY: DO YOU FEEL UNSAFE GOING BACK TO THE PLACE WHERE YOU ARE LIVING?: NO

## 2025-02-28 SDOH — SOCIAL STABILITY: SOCIAL INSECURITY: HAVE YOU HAD THOUGHTS OF HARMING ANYONE ELSE?: NO

## 2025-02-28 SDOH — ECONOMIC STABILITY: HOUSING INSECURITY: IN THE LAST 12 MONTHS, WAS THERE A TIME WHEN YOU WERE NOT ABLE TO PAY THE MORTGAGE OR RENT ON TIME?: NO

## 2025-02-28 SDOH — ECONOMIC STABILITY: HOUSING INSECURITY: IN THE PAST 12 MONTHS, HOW MANY TIMES HAVE YOU MOVED WHERE YOU WERE LIVING?: 0

## 2025-02-28 SDOH — SOCIAL STABILITY: SOCIAL INSECURITY: ARE YOU OR HAVE YOU BEEN THREATENED OR ABUSED PHYSICALLY, EMOTIONALLY, OR SEXUALLY BY ANYONE?: NO

## 2025-02-28 SDOH — SOCIAL STABILITY: SOCIAL INSECURITY: ABUSE: ADULT

## 2025-02-28 SDOH — ECONOMIC STABILITY: FOOD INSECURITY: HOW HARD IS IT FOR YOU TO PAY FOR THE VERY BASICS LIKE FOOD, HOUSING, MEDICAL CARE, AND HEATING?: NOT HARD AT ALL

## 2025-02-28 SDOH — ECONOMIC STABILITY: TRANSPORTATION INSECURITY: IN THE PAST 12 MONTHS, HAS LACK OF TRANSPORTATION KEPT YOU FROM MEDICAL APPOINTMENTS OR FROM GETTING MEDICATIONS?: NO

## 2025-02-28 SDOH — SOCIAL STABILITY: SOCIAL INSECURITY: DOES ANYONE TRY TO KEEP YOU FROM HAVING/CONTACTING OTHER FRIENDS OR DOING THINGS OUTSIDE YOUR HOME?: NO

## 2025-02-28 ASSESSMENT — COGNITIVE AND FUNCTIONAL STATUS - GENERAL
DAILY ACTIVITIY SCORE: 24
MOBILITY SCORE: 24
PATIENT BASELINE BEDBOUND: NO

## 2025-02-28 ASSESSMENT — PAIN SCALES - GENERAL
PAINLEVEL_OUTOF10: 0 - NO PAIN
PAINLEVEL_OUTOF10: 3
PAINLEVEL_OUTOF10: 2
PAINLEVEL_OUTOF10: 5 - MODERATE PAIN

## 2025-02-28 ASSESSMENT — ACTIVITIES OF DAILY LIVING (ADL)
JUDGMENT_ADEQUATE_SAFELY_COMPLETE_DAILY_ACTIVITIES: YES
GROOMING: INDEPENDENT
HEARING - RIGHT EAR: FUNCTIONAL
LACK_OF_TRANSPORTATION: NO
WALKS IN HOME: INDEPENDENT
DRESSING YOURSELF: INDEPENDENT
FEEDING YOURSELF: INDEPENDENT
HEARING - LEFT EAR: FUNCTIONAL
PATIENT'S MEMORY ADEQUATE TO SAFELY COMPLETE DAILY ACTIVITIES?: YES
BATHING: INDEPENDENT
ADEQUATE_TO_COMPLETE_ADL: YES
LACK_OF_TRANSPORTATION: NO
TOILETING: INDEPENDENT

## 2025-02-28 ASSESSMENT — LIFESTYLE VARIABLES
SKIP TO QUESTIONS 9-10: 1
HOW OFTEN DO YOU HAVE A DRINK CONTAINING ALCOHOL: MONTHLY OR LESS
AUDIT-C TOTAL SCORE: 1
HOW OFTEN DO YOU HAVE 6 OR MORE DRINKS ON ONE OCCASION: NEVER
HOW MANY STANDARD DRINKS CONTAINING ALCOHOL DO YOU HAVE ON A TYPICAL DAY: 1 OR 2
AUDIT-C TOTAL SCORE: 1

## 2025-02-28 ASSESSMENT — PATIENT HEALTH QUESTIONNAIRE - PHQ9
1. LITTLE INTEREST OR PLEASURE IN DOING THINGS: NOT AT ALL
SUM OF ALL RESPONSES TO PHQ9 QUESTIONS 1 & 2: 0
2. FEELING DOWN, DEPRESSED OR HOPELESS: NOT AT ALL

## 2025-02-28 ASSESSMENT — PAIN DESCRIPTION - DESCRIPTORS: DESCRIPTORS: HEAVINESS

## 2025-02-28 ASSESSMENT — PAIN - FUNCTIONAL ASSESSMENT
PAIN_FUNCTIONAL_ASSESSMENT: 0-10

## 2025-02-28 ASSESSMENT — COLUMBIA-SUICIDE SEVERITY RATING SCALE - C-SSRS
1. IN THE PAST MONTH, HAVE YOU WISHED YOU WERE DEAD OR WISHED YOU COULD GO TO SLEEP AND NOT WAKE UP?: NO
2. HAVE YOU ACTUALLY HAD ANY THOUGHTS OF KILLING YOURSELF?: NO
6. HAVE YOU EVER DONE ANYTHING, STARTED TO DO ANYTHING, OR PREPARED TO DO ANYTHING TO END YOUR LIFE?: NO

## 2025-02-28 ASSESSMENT — PAIN DESCRIPTION - LOCATION
LOCATION: CHEST
LOCATION: CHEST

## 2025-02-28 NOTE — H&P
[facility] Hospitalist History and Physical Note         Jenn Antunez  :  1955(70 y.o.)  MRN:  [id number]  PCP: Devin Novak, DO    Assessment & Plan  Chest pain      Assessment and Plan:     Jenn Antunez is a 70 y.o. female with PMH of asthma and GERD.  Patient was recently admitted (2025) at [facility] for influenza A complicated by asthma exacerbation and currently being treated for secondary bacterial bronchitis outpatient by PCP on steroid taper and doxycycline.  Patient presented from home with severe substernal chest pain radiating to the shoulder blade and right jaw shortly after waking up in the morning to get ready for the day.  Patient describes pain as squeezing sensation.  Patient initially thought symptoms were due to her GERD and took Tums without any relief.  Symptoms significantly improved following administration of aspirin and nitroglycerin by EMS.  Patient denies worsening factors.  Denies dyspnea, palpitation, nausea, emesis, LE edema, fever, rigor. ROS is positive for ongoing yellow productive cough since influenza infection in early February.    In ED VSS.  Labs are grossly unrevealing including negative troponin x 2.  No acute ST changes on ECG including malignant arrhythmia.  Chest x-ray negative for acute findings.  CT chest abdomen pelvis was negative for acute findings including dissection.  However there is incidental mild ectasia of the ascending aorta up to 3.8 cm. Stable mild left-sided hydronephrosis. Hepatic steatosis.    Patient was admitted for chest pain workup.    #Chest pain r/o ACS/MI  #Recent acute bronchitis on steroid taper and doxycycline  #GERD on Protonix  #Incidental mild ectasia of the ascending aorta up to 3.8 cm.  #Stable hepatic steatosis  #Stable mild left-sided hydronephrosis  -CP order set  -awaiting cardiac stress test and TTE  -ASA, PRN nitroglycerin  -check lipid panel and A1C  -resume home prednisone and nitroglycerin  -CS recs  appreciated: cardiology    Disposition: await test results, await consultant recommendations, and await clinical improvement    DVT Prophylaxis: Subq Lovenox    Code status: Full Code  Diet: NPO Diet Except: Sips with meds; Effective now    BMI Classification: Body mass index is 28.34 kg/m². Overweight BMI 25-29.9    Level of MDM:  High    patient and family updated, I personally examined the patient, and I personally reviewed chart, data, labs radiology reports    Family Communication  Number:   Name of Designated Family Representative:       Total time spent: 75 minutes, of total time providing counseling or in coordination of care.  Total time on this day of visit includes record and documentation review before and after visit including documentation and time not explicitly included on EMR time stamp.    [phone]: Please page me for patient care issues.  [phone]: Please page night hospitalist for any issues.        Subjective:     Chief Complaint   Patient presents with    Chest Pain     Interval History:  Jenn Antunez is a 70 y.o. female with PMH of asthma and GERD.  Patient was recently admitted (2/6/2025 2/8/25) for influenza A complicated by asthma exacerbation and currently being treated for secondary bacterial bronchitis outpatient by PCP on steroid taper and doxycycline.  Patient presented from home with severe substernal chest pain radiating to the shoulder blade and right jaw shortly after waking up in the morning to get ready for the day.  Patient describes pain as squeezing sensation.  Patient initially thought symptoms were due to her GERD and took Tums without any relief.  Symptoms significantly improved following administration of aspirin and nitroglycerin by EMS.  Patient denies worsening factors.  Denies dyspnea, palpitation, nausea, emesis, LE edema, fever, rigor. ROS is positive for ongoing yellow productive cough since influenza infection in early February.    In ED VSS.  Labs are grossly  unrevealing including negative troponin x 2.  No acute ST changes on ECG including malignant arrhythmia.  Chest x-ray negative for acute findings.  CT chest abdomen pelvis was negative for acute findings including dissection.  However there is incidental mild ectasia of the ascending aorta up to 3.8 cm. Stable mild left-sided hydronephrosis. Hepatic steatosis.    Patient was admitted for chest pain workup.    Temperature:  [36.9 °C (98.4 °F)] 36.9 °C (98.4 °F)  Heart Rate:  [65-70] 70  Respirations:  [14-20] 20  BP: (124-144)/(64-81) 140/80  Lab Results   Component Value Date    GLUCOSE 111 (H) 02/28/2025    CALCIUM 9.2 02/28/2025     02/28/2025    K 3.9 02/28/2025    CO2 27 02/28/2025     02/28/2025    BUN 28 (H) 02/28/2025    CREATININE 0.77 02/28/2025     Lab Results   Component Value Date    WBC 9.7 02/28/2025    HGB 13.5 02/28/2025    HCT 40.1 02/28/2025    MCV 89 02/28/2025     02/28/2025     IMAGES:  Encounter Date: 02/06/25   ECG 12 Lead   Result Value    Ventricular Rate 67    Atrial Rate 67    CA Interval 152    QRS Duration 92    QT Interval 430    QTC Calculation(Bazett) 454    P Axis 44    R Axis -46    T Axis 30    QRS Count 11    Q Onset 209    P Onset 133    P Offset 179    T Offset 424    QTC Fredericia 446    Narrative    Sinus rhythm with Fusion complexes  Left anterior fascicular block  Moderate voltage criteria for LVH, may be normal variant ( R in aVL , [place] product )  Abnormal ECG  When compared with ECG of 13-DEC-2024 14:31, (unconfirmed)  Fusion complexes are now Present  See ED provider note for full interpretation and clinical correlation  Confirmed by Harish Tipton ([id number]) on 2/11/2025 12:00:02 PM        Transthoracic echo (TTE) complete    Result Date: 4/30/2024   [facility], [address] [address]               Tel [phone] and Fax [phone] TRANSTHORACIC ECHOCARDIOGRAM REPORT  Patient Name:      DENISA Sy  Physician:    [id number] Gelacio Dobbins MD Study Date:        4/26/2024            Ordering Provider:    [id number] GELACIO DOBBINS MRN/PID:           [id number]             Fellow: Accession#:        [id number]         Nurse: Date of Birth/Age: 1955 / 69 years Sonographer:          Emily Rubio                                                               RDCS Gender:            F                    Additional Staff: Height:            152.40 cm            Admit Date: Weight:            71.67 kg             Admission Status:     Outpatient BSA / BMI:         1.69 m2 / 30.86      Encounter#:           [id number]                    kg/m2                                         Department Location:  [facility]                                                               Invasive Blood Pressure: 132 /78 mmHg Study Type:    TRANSTHORACIC ECHO (TTE) COMPLETE Diagnosis/ICD: Localized edema-R60.0 Indication:    Leg edema CPT Code:      Echo Complete w Full [id number] Patient History: Pertinent History: H/O viral pericardial effusion (2017), Chest Pain and                    Palpitations. Study Detail: The following Echo studies were performed: 2D, M-Mode, Doppler and               color flow. The patient was awake.  PHYSICIAN INTERPRETATION: Left Ventricle: The left ventricular systolic function is normal. The calculated ejection fraction is normal at 59 % using the Weber's Bi-plane MOD calculation. There are no regional wall motion abnormalities. The left ventricular cavity size is normal. Left ventricular diastolic filling was indeterminate. Left Atrium: The left atrium is normal in size. Right Ventricle: The right ventricle is normal in size. There is mildly increased right ventriclar wall thickness. There is normal right ventricular global systolic function. Right Atrium: The right atrium  is normal in size. Aortic Valve: The aortic valve is trileaflet. There is no evidence of aortic valve regurgitation. The peak instantaneous gradient of the aortic valve is 5.9 mmHg. The mean gradient of the aortic valve is 3.0 mmHg. Mitral Valve: The mitral valve is mildly thickened. There is no evidence of mitral valve regurgitation. Tricuspid Valve: The tricuspid valve is structurally normal. There is trace tricuspid regurgitation. The Doppler estimated RVSP is within normal limits at 24.3 mmHg. Pulmonic Valve: The pulmonic valve is not well visualized. The pulmonic valve regurgitation was not well visualized. Pericardium: There is no pericardial effusion noted. Aorta: The aortic root is normal. There is upper limits of normal dilatation of the ascending aorta. In comparison to the previous echocardiogram(s): Compared with study from 7/29/2021, no significant change.  CONCLUSIONS:  1. Left ventricular systolic function is normal.  2. RVSP within normal limits. QUANTITATIVE DATA SUMMARY: 2D MEASUREMENTS:                          Normal Ranges: IVSd:          0.84 cm   (0.6-1.1cm) LVPWd:         0.93 cm   (0.6-1.1cm) LVIDd:         5.02 cm   (3.9-5.9cm) LVIDs:         3.07 cm LV Mass Index: 91.9 g/m2 LV % FS        38.8 % LA VOLUME:                               Normal Ranges: LA Vol A4C:        23.9 ml    (22+/-6mL/m2) LA Vol A2C:        44.3 ml LA Vol BP:         33.4 ml LA Vol Index A4C:  14.1ml/m2 LA Vol Index A2C:  26.2 ml/m2 LA Vol Index BP:   19.8 ml/m2 LA Area A4C:       11.2 cm2 LA Area A2C:       15.7 cm2 LA Major Axis A4C: 4.5 cm LA Major Axis A2C: 4.7 cm LA Volume Index:   23.7 ml/m2 LA Vol A4C:        21.3 ml LA Vol A2C:        40.0 ml RA VOLUME BY A/L METHOD:                      Normal Ranges: RA Area A4C: 6.5 cm2 AORTA MEASUREMENTS:                    Normal Ranges: Asc Ao, d: 3.50 cm (2.1-3.4cm) LV SYSTOLIC FUNCTION BY 2D PLANIMETRY (MOD):                     Normal Ranges: EF-A4C View: 55.0 %  (>=55%) EF-A2C View: 61.7 % EF-Biplane:  59.4 % LV DIASTOLIC FUNCTION:                           Normal Ranges: MV Peak E:    1.00 m/s    (0.7-1.2 m/s) MV Peak A:    1.14 m/s    (0.42-0.7 m/s) E/A Ratio:    0.88        (1.0-2.2) MV e'         0.05 m/s    (>8.0) MV lateral e' 0.05 m/s MV medial e'  0.05 m/s MV A Dur:     116.00 msec E/e' Ratio:   20.00       (<8.0) MITRAL VALVE:                 Normal Ranges: MV DT: 225 msec (150-240msec) AORTIC VALVE:                                   Normal Ranges: AoV Vmax:                1.21 m/s (<=1.7m/s) AoV Peak P.9 mmHg (<20mmHg) AoV Mean PG:             3.0 mmHg (1.7-11.5mmHg) LVOT Max Km:            1.08 m/s (<=1.1m/s) AoV VTI:                 27.40 cm (18-25cm) LVOT VTI:                21.30 cm AoV Dimensionless Index: 0.78  RIGHT VENTRICLE: RV Basal 1.58 cm RV Mid   1.00 cm RV Major 6.5 cm TAPSE:   18.3 mm RV s'    0.11 m/s TRICUSPID VALVE/RVSP:                             Normal Ranges: Peak TR Velocity: 2.31 m/s RV Syst Pressure: 24.3 mmHg (< 30mmHg) IVC Diam:         1.48 cm PULMONIC VALVE:                      Normal Ranges: PV Max Km: 0.7 m/s  (0.6-0.9m/s) PV Max P.0 mmHg  [id number] Luis Goodson MD Electronically signed on 2024 at 2:50:08 PM  ** Final **    === 25 ===    XR CHEST 1 VIEW    - Impression -  No acute cardiopulmonary disease.  Signed by Don Wen MD  === 25 ===    CT ANGIO CHEST ABDOMEN PELVIS    - Impression -  No evidence of an aortic dissection or aneurysm. Mild ectasia of the  ascending aorta up to 3.8 cm. Stable mild left-sided hydronephrosis.  Hepatic steatosis.      MACRO:  None.    Signed by: Titus Gilbert 2025 8:51 AM  Dictation workstation:   [id number]  === 25 ===    XR CHEST 1 VIEW    - Impression -  No acute cardiopulmonary disease.  Signed by Don Wen MD  === 19 ===    MRI BRAIN W WO CONTRAST    - Impression -  No acute intracranial pathologic findings are identified.  Numerous  white matter hyperintense lesions are identified in the T2 and  FLAIR examination many of which are likely age-related or secondary to  microvascular disease. There are some lesions identified within the  subcortical white matter of the left parietal lobe, pattern which can be  seen in association with demyelinating disease such is multiple sclerosis.  Please correlate with patient's prior history.    This report has been produced using speech recognition.      Original Interpreting Physician:   CASI SNOWDEN M.D.  Original Transcribed by/Date: PSCB   Jan 30 2019  9:27A  Original Electronically Signed by/Date: CASI SNOWDEN M.D. Jan 30 2019  9:27A    Addendum Interpreting Physician:    Addendum Transcribed by/Date: NO ADDENDUM  Addendum Electronically Signed by/Date:    Past Medical History:   Diagnosis Date    Personal history of other diseases of the digestive system 09/20/2013    History of esophageal reflux     Past Surgical History:   Procedure Laterality Date    OTHER SURGICAL HISTORY  11/11/2022    Appendectomy    OTHER SURGICAL HISTORY  11/11/2022    Biceps tendon rupture repair     Family History   Problem Relation Name Age of Onset    Lung cancer Father      Other (m aunt breast cancer hx) Other       Social History     Socioeconomic History    Marital status:      Spouse name: Not on file    Number of children: Not on file    Years of education: Not on file    Highest education level: Not on file   Occupational History    Not on file   Tobacco Use    Smoking status: Never    Smokeless tobacco: Never   Vaping Use    Vaping status: Never Used   Substance and Sexual Activity    Alcohol use: Not Currently     Comment: socially    Drug use: Never    Sexual activity: Not on file   Other Topics Concern    Not on file   Social History Narrative    Not on file     Social Drivers of Health     Financial Resource Strain: Low Risk  (2/28/2025)    Overall Financial Resource Strain (CARDIA)     Difficulty of  Paying Living Expenses: Not hard at all   Food Insecurity: No Food Insecurity (2/6/2025)    Hunger Vital Sign     Worried About Running Out of Food in the Last Year: Never true     Ran Out of Food in the Last Year: Never true   Transportation Needs: No Transportation Needs (2/28/2025)    PRAPARE - Transportation     Lack of Transportation (Medical): No     Lack of Transportation (Non-Medical): No   Physical Activity: Not on file   Stress: Not on file   Social Connections: Not on file   Intimate Partner Violence: Not At Risk (2/6/2025)    Humiliation, Afraid, Rape, and Kick questionnaire     Fear of Current or Ex-Partner: No     Emotionally Abused: No     Physically Abused: No     Sexually Abused: No   Housing Stability: Low Risk  (2/28/2025)    Housing Stability Vital Sign     Unable to Pay for Housing in the Last Year: No     Number of Times Moved in the Last Year: 0     Homeless in the Last Year: No       Allergies   Allergen Reactions    Azithromycin Unknown    Erythromycin Other    Levaquin [Levofloxacin] Other     Chest pain    Nitrofurantoin Macrocrystal Other    Nitrofurantoin Macrocrystalline Unknown     Stomach upset    Nitrofurantoin Monohyd/M-Cryst Unknown    Sulfa (Sulfonamide Antibiotics) Swelling    Sulfur Swelling     Prior to Admission medications    Medication Sig Start Date End Date Taking? Authorizing Provider   albuterol (Ventolin HFA) 90 mcg/actuation inhaler Inhale 2 puffs every 4 hours if needed for wheezing or shortness of breath. 2/26/25 2/26/26  Lupe Martino MD   clotrimazole (Mycelex) 10 mg claire Take 1 tablet (10 mg) by mouth 4 times a day.    Historical Provider, MD   doxycycline (Adoxa) 100 mg tablet Take 1 tablet (100 mg) by mouth 2 times a day for 7 days. Take with a full glass of water and do not lie down for at least 30 minutes after 2/22/25 3/1/25  Lupe Martino MD   fluticasone propion-salmeteroL (Advair HFA) 115-21 mcg/actuation inhaler Inhale 2 puffs every 12 hours. 2/26/25    "Lupe Martino MD   guaiFENesin (Mucinex) 1,200 mg tablet extended release 12hr Take 1 tablet (1,200 mg) by mouth 2 times a day as needed (cough and congestion) for up to 10 doses. Do not crush, chew, or split. 2/8/25   Sheldon Devi,    lactobacillus acidophilus capsule Take by mouth.    Historical Provider, MD   levoFLOXacin (Levaquin) 250 mg tablet Take 2 tablets (500 mg) by mouth once daily for 5 days. 2/21/25 2/26/25  Lupe Martino MD   pantoprazole (ProtoNix) 40 mg EC tablet TAKE 1 TABLET BY MOUTH EVERY DAY 1/22/25   Devin Novak DO   predniSONE (Deltasone) 10 mg tablet Take 4 tabs (40mg) daily for 3 days, then 3 tabs (30mg) daily for 3 days,  2 tabs (20mg) daily for 3 days,  1 tab (10 mg) daily for 3 days. 2/21/25 3/23/25  Lupe Martino MD   tretinoin (Retin-A) 0.05 % cream APPLY PEA SIZED AMOUNT TO FACE EVERY NIGHT 8/24/23   Historical Provider, MD   urea 40 % lotion APPLY TO AFFECTED AREAS EVERY DAY 4/6/23   Historical Provider, MD   albuterol (Ventolin HFA) 90 mcg/actuation inhaler Inhale 2 puffs every 4 hours if needed for wheezing or shortness of breath. 2/21/25 2/26/25  Lupe Martino MD   fluticasone propion-salmeteroL (Advair HFA) 115-21 mcg/actuation inhaler INHALE 2 PUFFS BY MOUTH EVERY 12 HOURS IN THE MORNING AND IN THE EVENING 4/15/24 2/26/25  Carlitos Wen MD       Review of Systems   All other systems reviewed and are negative.      Objective:     Vitals:    02/28/25 0800 02/28/25 0803 02/28/25 0900 02/28/25 1000   BP: 124/72 144/81 128/64 140/80   Pulse: 68 70 65 70   Resp: 14 18 17 20   Temp:  36.9 °C (98.4 °F)     SpO2: 98% 97% 97% 99%   Weight:  72.6 kg (160 lb)     Height:  1.6 m (5' 3\")       Physical Exam  Vitals and nursing note reviewed.   Constitutional:       Appearance: Normal appearance.   HENT:      Head: Normocephalic and atraumatic.      Right Ear: External ear normal.      Left Ear: External ear normal.      Nose: Nose normal.      Mouth/Throat:      Mouth: Mucous " membranes are moist.   Eyes:      General: No scleral icterus.        Right eye: No discharge.         Left eye: No discharge.      Extraocular Movements: Extraocular movements intact.      Conjunctiva/sclera: Conjunctivae normal.      Pupils: Pupils are equal, round, and reactive to light.   Cardiovascular:      Rate and Rhythm: Normal rate and regular rhythm.   Pulmonary:      Effort: Pulmonary effort is normal.      Breath sounds: No rales.   Abdominal:      General: Abdomen is flat. Bowel sounds are normal.      Palpations: Abdomen is soft.   Musculoskeletal:         General: Normal range of motion.      Right lower leg: No edema.      Left lower leg: No edema.   Skin:     General: Skin is warm and dry.      Capillary Refill: Capillary refill takes less than 2 seconds.   Neurological:      General: No focal deficit present.   Psychiatric:         Mood and Affect: Mood normal.         Thought Content: Thought content normal.         Judgment: Judgment normal.         Lab Results   Component Value Date     02/28/2025    K 3.9 02/28/2025     02/28/2025    CO2 27 02/28/2025    BUN 28 (H) 02/28/2025    CREATININE 0.77 02/28/2025    GLUCOSE 111 (H) 02/28/2025    CALCIUM 9.2 02/28/2025    PROT 6.9 02/28/2025    BILITOT 0.4 02/28/2025    ALKPHOS 71 02/28/2025    AST 15 02/28/2025    ALT 32 02/28/2025    GLOB 2.6 03/21/2023     Lab Results   Component Value Date    WBC 9.7 02/28/2025    HGB 13.5 02/28/2025    HCT 40.1 02/28/2025    MCV 89 02/28/2025     02/28/2025     Lab Results   Component Value Date    TSH 1.00 12/17/2024     Lab Results   Component Value Date    LACTATE 1.5 09/19/2024    TROPONINI <0.02 07/28/2021    BNP 26 07/28/2021    INR 1.0 02/28/2025     Additional results since admission have been reviewed.    Dictated using [employer] Version 2.4  Proof read however unrecognized voice recognition errors may have occurred     Electronically signed by Medina Frederick DO on  02/28/25 at 10:50 AM

## 2025-02-28 NOTE — PROGRESS NOTES
Pharmacy Medication History Review    Jenn Antunez is a 70 y.o. female admitted for Chest pain. Pharmacy reviewed the patient's [id number] medications and allergies for accuracy.    The list below reflectives the updated PTA list. Please review each medication in order reconciliation for additional clarification and justification.  Prior to Admission Medications   Prescriptions Last Dose Informant Patient Reported? Taking?   QUERCETIN ORAL 2/27/2025 Self Yes Yes   Sig: Take 1 capsule by mouth once daily.   albuterol (Ventolin HFA) 90 mcg/actuation inhaler 2/27/2025 Evening Self Yes Yes   Sig: Inhale 2 puffs every 4 hours if needed for wheezing or shortness of breath.   bimatoprost (Latisse) 0.03 % ophthalmic solution 2/26/2025 Self Yes Yes   Sig: Administer 1 drop into both eyes once daily at bedtime. Place 1 drop on applicator and apply along skin of upper eyelid at base of eyelashes daily at bedtime; rpt for 2nd eye with clean applicator   cholecalciferol (Vitamin D-3) 25 MCG (1000 UT) capsule 2/27/2025 Self Yes Yes   Sig: Take 1 capsule (25 mcg) by mouth once daily.   clotrimazole (Mycelex) 10 mg claire 2/23/2025 Self Yes Yes   Sig: Take 1 tablet (10 mg) by mouth 4 times a day as needed.   doxycycline (Adoxa) 100 mg tablet Not Taking Self No No   Sig: Take 1 tablet (100 mg) by mouth 2 times a day for 7 days. Take with a full glass of water and do not lie down for at least 30 minutes after   Patient not taking: Reported on 2/28/2025   fluticasone propion-salmeteroL (Advair HFA) 115-21 mcg/actuation inhaler 2/28/2025 Self Yes Yes   Sig: Inhale 2 puffs every 12 hours.   guaiFENesin (Mucinex) 1,200 mg tablet extended release 12hr Not Taking Self No No   Sig: Take 1 tablet (1,200 mg) by mouth 2 times a day as needed (cough and congestion) for up to 10 doses. Do not crush, chew, or split.   Patient not taking: Reported on 2/28/2025   lactobacillus acidophilus capsule 2/27/2025 Morning Self Yes Yes   Sig:  Take 1 capsule by mouth once daily.   levoFLOXacin (Levaquin) 250 mg tablet Not Taking  No No   Sig: Take 2 tablets (500 mg) by mouth once daily for 5 days.   Patient not taking: Reported on 2/28/2025   multivit-min/vit C/herb no.124 ([place], ASCORBIC ACID, ORAL) 2/27/2025 Self Yes Yes   Sig: Take 1 tablet by mouth once daily.   olopatadine (Patanol) 0.1 % ophthalmic solution Unknown Self Yes Yes   Sig: Administer 1 drop into both eyes 2 times a day as needed for allergies.   pantoprazole (ProtoNix) 40 mg EC tablet 2/27/2025 Morning Self Yes Yes   Sig: TAKE 1 TABLET BY MOUTH EVERY DAY   predniSONE (Deltasone) 10 mg tablet 2/27/2025 Self Yes Yes   Sig: Take 4 tabs (40mg) daily for 3 days, then 3 tabs (30mg) daily for 3 days,  2 tabs (20mg) daily for 3 days,  1 tab (10 mg) daily for 3 days.   tretinoin (Retin-A) 0.05 % cream 2/27/2025 Evening Self Yes Yes   Sig: APPLY PEA SIZED AMOUNT TO FACE EVERY NIGHT   urea 40 % lotion Past Week Self Yes Yes   Sig: APPLY TO AFFECTED AREAS EVERY DAY      Facility-Administered Medications: None           The list below reflectives the updated allergy list. Please review each documented allergy for additional clarification and justification.  Allergies  Reviewed by Destiny Yeager RN on 2/28/2025        Severity Reactions Comments    Azithromycin Not Specified Unknown     Erythromycin Not Specified Other     Levaquin [levofloxacin] Not Specified Other Chest pain    Nitrofurantoin Macrocrystal Not Specified Other     Nitrofurantoin Macrocrystalline Not Specified Unknown Stomach upset    Nitrofurantoin Monohyd/m-cryst Not Specified Unknown     Sulfa (sulfonamide Antibiotics) Not Specified Swelling     Sulfur Not Specified Swelling             Below are additional concerns with the patient's PTA list.      Nichole Garcia

## 2025-02-28 NOTE — PROGRESS NOTES
02/28/25 1002   Discharge Planning   Living Arrangements Spouse/significant other   Support Systems Spouse/significant other   Assistance Needed A&OX4; independent with ADLs with no DME; drives; room air baseline and currently room air; PCP Dr Novak   Type of Residence Private residence   Number of Stairs to Enter Residence 13   Number of Stairs Within Residence 13   Do you have animals or pets at home? Yes   Type of Animals or Pets 1 dog   Who is requesting discharge planning? Provider   Expected Discharge Disposition Home  (DC dispo is pending cardiology.)   Does the patient need discharge transport arranged? No   Financial Resource Strain   How hard is it for you to pay for the very basics like food, housing, medical care, and heating? Not hard   Housing Stability   In the last 12 months, was there a time when you were not able to pay the mortgage or rent on time? N   In the past 12 months, how many times have you moved where you were living? 0   At any time in the past 12 months, were you homeless or living in a shelter (including now)? N   Transportation Needs   In the past 12 months, has lack of transportation kept you from medical appointments or from getting medications? no   In the past 12 months, has lack of transportation kept you from meetings, work, or from getting things needed for daily living? No   Intensity of Service   Intensity of Service 0-30 min     02/28/2025 1004am  Spoke with patient and patient's spouse bedside in ED

## 2025-02-28 NOTE — ED PROVIDER NOTES
CC: Chest Pain     HPI:  Patient is a 70-year-old female with a history of asthma and GERD who was recently diagnosed with influenza A presents to the emergency department severe chest pain that radiated to bilateral shoulders and upper right jaw.  She states she was fine when she woke up however approximately 20 minutes after waking up the symptoms started.  They occurred while walking upstairs.  She was given aspirin and nitroglycerin by squad and is feeling better.    Records Reviewed:  Recent available ED and inpatient notes reviewed in EMR.    PMHx/PSHx:  Per HPI.   - has a past medical history of Personal history of other diseases of the digestive system.  - has a past surgical history that includes Other surgical history (11/11/2022) and Other surgical history (11/11/2022).  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    Medications:  Reviewed in EMR. See EMR for complete list of medications and doses.    Allergies:  Azithromycin, Erythromycin, Levaquin [levofloxacin], Nitrofurantoin macrocrystal, Nitrofurantoin macrocrystalline, Nitrofurantoin monohyd/m-cryst, Sulfa (sulfonamide antibiotics), and Sulfur    Social History:  - Tobacco:  reports that she has never smoked. She has never  used smokeless tobacco.   - Alcohol:  reports that she does not currently use alcohol.   - Illicit Drugs:  reports no history of drug use.     ROS:  Per HPI.       ???????????????????????????????????????????????????????????????  Triage Vitals:  T 36.9 °C (98.4 °F)  HR 68  /72  RR 14  O2 98 %      Physical Exam  ???????????????????????????????????????????????????????????????  GEN: no acute distress  HEAD: atraumatic  EYES: PERRL, EOMI, no scleral icterus  ENT: mmm  NECK: no JVD  CVS/CHEST: reg rate, nl rhythm  PULM: CTA b/l no wheezes, crackles, or rhonchi   GI: soft, NT/ND, no rebound or guarding   EXT: no LE edema, 2+ periph pulses in bilat radial and DP   NEURO: Awake and alert, Strength and sensation is equal in b/l upper and lower extremities  SKIN: warm, dry  PSYCH: AAOx3 answers questions appropriately    Assessment and Plan:  Patient is a 70-year-old female who presents the emergency department chest pain.  Given the character of the chest pain which she describes as sharp that was rating to her bilateral shoulders and up to her right neck is concern for aortic dissection.  Labs waived and she was taken to CT.  However she is overall well-appearing.  She is not in acute distress.  She has equal pulses in upper and lower extremities which points away from the dissection.  Initial EKG does not show STEMI but given the chest pain that relieved with nitroglycerin would likely benefit from inpatient admission pending workup.  Discussed with the patient and significant other at bedside who are agreeable with plan.  Given that duration of symptoms started just prior to arrival she will require multiple troponins.    See ED course.    Given the continuation of chest pain despite reassuring workup in the emergency department will admit for observation.    ED Course:  ED Course as of 03/02/25 0740   Fri Feb 28, 2025   0759 EKG read by me reviewed by me is normal sinus rhythm at 68 bpm.  Left axis deviation.   No significant ST segment elevation or depression. [HD]   0836 Troponin I, High Sensitivity: 3 [HD]   0836 CTA reviewed by me I do not see evidence of dissection. [HD]   0921 No evidence of an aortic dissection or aneurysm. Mild ectasia of the  ascending aorta up to 3.8 cm. Stable mild left-sided hydronephrosis.  Hepatic steatosis.       [HD]   1020 Patient reported chest pain therefore repeat EKG obtained.  It is normal sinus rhythm at 61 bpm.  There is a left axis deviation.  I do not appreciate significant ST segment elevation or depression.  No significant T wave changes from prior. [HD]      ED Course User Index  [HD] Dianne Blum DO         Diagnoses as of 03/02/25 0740   Chest pain, unspecified type       Disposition:  admitted    Dianne Blum DO      Procedures ? Smart[employer] last updated 2/28/2025 8:11 AM        Dianne Blum DO  03/02/25 0741

## 2025-03-01 VITALS
HEIGHT: 63 IN | BODY MASS INDEX: 30.18 KG/M2 | TEMPERATURE: 98.6 F | OXYGEN SATURATION: 94 % | RESPIRATION RATE: 13 BRPM | WEIGHT: 170.3 LBS | DIASTOLIC BLOOD PRESSURE: 78 MMHG | SYSTOLIC BLOOD PRESSURE: 133 MMHG | HEART RATE: 70 BPM

## 2025-03-01 LAB
ALBUMIN SERPL BCP-MCNC: 3.9 G/DL (ref 3.4–5)
ANION GAP SERPL CALC-SCNC: 13 MMOL/L (ref 10–20)
BASOPHILS # BLD AUTO: 0.02 X10*3/UL (ref 0–0.1)
BASOPHILS NFR BLD AUTO: 0.3 %
BUN SERPL-MCNC: 25 MG/DL (ref 6–23)
CALCIUM SERPL-MCNC: 8.7 MG/DL (ref 8.6–10.3)
CHLORIDE SERPL-SCNC: 103 MMOL/L (ref 98–107)
CO2 SERPL-SCNC: 26 MMOL/L (ref 21–32)
CREAT SERPL-MCNC: 0.64 MG/DL (ref 0.5–1.05)
EGFRCR SERPLBLD CKD-EPI 2021: >90 ML/MIN/1.73M*2
EOSINOPHIL # BLD AUTO: 0.14 X10*3/UL (ref 0–0.4)
EOSINOPHIL NFR BLD AUTO: 2 %
ERYTHROCYTE [DISTWIDTH] IN BLOOD BY AUTOMATED COUNT: 14.6 % (ref 11.5–14.5)
GLUCOSE SERPL-MCNC: 98 MG/DL (ref 74–99)
HCT VFR BLD AUTO: 38.8 % (ref 36–46)
HGB BLD-MCNC: 13.2 G/DL (ref 12–16)
IMM GRANULOCYTES # BLD AUTO: 0.05 X10*3/UL (ref 0–0.5)
IMM GRANULOCYTES NFR BLD AUTO: 0.7 % (ref 0–0.9)
LYMPHOCYTES # BLD AUTO: 2.41 X10*3/UL (ref 0.8–3)
LYMPHOCYTES NFR BLD AUTO: 34.2 %
MAGNESIUM SERPL-MCNC: 2.09 MG/DL (ref 1.6–2.4)
MCH RBC QN AUTO: 30.3 PG (ref 26–34)
MCHC RBC AUTO-ENTMCNC: 34 G/DL (ref 32–36)
MCV RBC AUTO: 89 FL (ref 80–100)
MONOCYTES # BLD AUTO: 0.61 X10*3/UL (ref 0.05–0.8)
MONOCYTES NFR BLD AUTO: 8.7 %
NEUTROPHILS # BLD AUTO: 3.81 X10*3/UL (ref 1.6–5.5)
NEUTROPHILS NFR BLD AUTO: 54.1 %
NRBC BLD-RTO: 0 /100 WBCS (ref 0–0)
PHOSPHATE SERPL-MCNC: 3.3 MG/DL (ref 2.5–4.9)
PLATELET # BLD AUTO: 289 X10*3/UL (ref 150–450)
POTASSIUM SERPL-SCNC: 3.8 MMOL/L (ref 3.5–5.3)
RBC # BLD AUTO: 4.36 X10*6/UL (ref 4–5.2)
SODIUM SERPL-SCNC: 138 MMOL/L (ref 136–145)
WBC # BLD AUTO: 7 X10*3/UL (ref 4.4–11.3)

## 2025-03-01 PROCEDURE — 85025 COMPLETE CBC W/AUTO DIFF WBC: CPT | Performed by: INTERNAL MEDICINE

## 2025-03-01 PROCEDURE — G0378 HOSPITAL OBSERVATION PER HR: HCPCS

## 2025-03-01 PROCEDURE — 80069 RENAL FUNCTION PANEL: CPT | Performed by: INTERNAL MEDICINE

## 2025-03-01 PROCEDURE — 36415 COLL VENOUS BLD VENIPUNCTURE: CPT | Performed by: INTERNAL MEDICINE

## 2025-03-01 PROCEDURE — 2500000001 HC RX 250 WO HCPCS SELF ADMINISTERED DRUGS (ALT 637 FOR MEDICARE OP): Performed by: INTERNAL MEDICINE

## 2025-03-01 PROCEDURE — 83735 ASSAY OF MAGNESIUM: CPT | Performed by: INTERNAL MEDICINE

## 2025-03-01 PROCEDURE — 99239 HOSP IP/OBS DSCHRG MGMT >30: CPT | Performed by: INTERNAL MEDICINE

## 2025-03-01 PROCEDURE — 2500000002 HC RX 250 W HCPCS SELF ADMINISTERED DRUGS (ALT 637 FOR MEDICARE OP, ALT 636 FOR OP/ED): Performed by: INTERNAL MEDICINE

## 2025-03-01 PROCEDURE — 2500000004 HC RX 250 GENERAL PHARMACY W/ HCPCS (ALT 636 FOR OP/ED): Performed by: INTERNAL MEDICINE

## 2025-03-01 PROCEDURE — 94640 AIRWAY INHALATION TREATMENT: CPT

## 2025-03-01 PROCEDURE — 94760 N-INVAS EAR/PLS OXIMETRY 1: CPT

## 2025-03-01 RX ORDER — BENZONATATE 200 MG/1
200 CAPSULE ORAL EVERY 8 HOURS
Qty: 90 CAPSULE | Refills: 1 | Status: SHIPPED | OUTPATIENT
Start: 2025-03-01

## 2025-03-01 RX ORDER — ACETAMINOPHEN AND CODEINE PHOSPHATE 300; 30 MG/1; MG/1
1 TABLET ORAL EVERY 8 HOURS PRN
Qty: 20 TABLET | Refills: 0 | Status: SHIPPED | OUTPATIENT
Start: 2025-03-01

## 2025-03-01 RX ORDER — NYSTATIN 100000 [USP'U]/ML
5 SUSPENSION ORAL 3 TIMES DAILY
Status: DISCONTINUED | OUTPATIENT
Start: 2025-03-01 | End: 2025-03-01 | Stop reason: HOSPADM

## 2025-03-01 RX ORDER — METHOCARBAMOL 500 MG/1
500 TABLET, FILM COATED ORAL 4 TIMES DAILY PRN
Qty: 60 TABLET | Refills: 0 | Status: SHIPPED | OUTPATIENT
Start: 2025-03-01

## 2025-03-01 RX ORDER — FLUCONAZOLE 100 MG/1
200 TABLET ORAL ONCE
Status: DISCONTINUED | OUTPATIENT
Start: 2025-03-01 | End: 2025-03-01 | Stop reason: HOSPADM

## 2025-03-01 RX ORDER — HYDROCODONE BITARTRATE AND HOMATROPINE METHYLBROMIDE ORAL SOLUTION 5; 1.5 MG/5ML; MG/5ML
5 LIQUID ORAL EVERY 6 HOURS PRN
Status: DISCONTINUED | OUTPATIENT
Start: 2025-03-01 | End: 2025-03-01 | Stop reason: HOSPADM

## 2025-03-01 RX ORDER — METHOCARBAMOL 500 MG/1
500 TABLET, FILM COATED ORAL EVERY 8 HOURS SCHEDULED
Status: DISCONTINUED | OUTPATIENT
Start: 2025-03-01 | End: 2025-03-01 | Stop reason: HOSPADM

## 2025-03-01 RX ORDER — FLUCONAZOLE 100 MG/1
100 TABLET ORAL DAILY
Qty: 13 TABLET | Refills: 0 | Status: SHIPPED | OUTPATIENT
Start: 2025-03-01 | End: 2025-03-14

## 2025-03-01 RX ORDER — ACETAMINOPHEN 500 MG
1000 TABLET ORAL EVERY 8 HOURS PRN
COMMUNITY
Start: 2025-03-01

## 2025-03-01 RX ADMIN — PANTOPRAZOLE SODIUM 40 MG: 40 TABLET, DELAYED RELEASE ORAL at 06:20

## 2025-03-01 RX ADMIN — ASPIRIN 81 MG 81 MG: 81 TABLET ORAL at 08:31

## 2025-03-01 RX ADMIN — NYSTATIN 500000 UNITS: 100000 SUSPENSION ORAL at 10:21

## 2025-03-01 RX ADMIN — FORMOTEROL FUMARATE DIHYDRATE 20 MCG: 20 SOLUTION RESPIRATORY (INHALATION) at 09:01

## 2025-03-01 RX ADMIN — PREDNISONE 10 MG: 10 TABLET ORAL at 08:31

## 2025-03-01 RX ADMIN — BENZONATATE 200 MG: 100 CAPSULE ORAL at 08:31

## 2025-03-01 RX ADMIN — BUDESONIDE 0.5 MG: 0.5 INHALANT RESPIRATORY (INHALATION) at 09:01

## 2025-03-01 RX ADMIN — IPRATROPIUM BROMIDE AND ALBUTEROL SULFATE 3 ML: .5; 3 SOLUTION RESPIRATORY (INHALATION) at 09:01

## 2025-03-01 RX ADMIN — GUAIFENESIN AND DEXTROMETHORPHAN 10 ML: 100; 10 SYRUP ORAL at 02:22

## 2025-03-01 RX ADMIN — DOXYCYCLINE HYCLATE 100 MG: 100 TABLET, COATED ORAL at 02:22

## 2025-03-01 ASSESSMENT — PAIN SCALES - GENERAL: PAINLEVEL_OUTOF10: 2

## 2025-03-01 ASSESSMENT — PAIN - FUNCTIONAL ASSESSMENT: PAIN_FUNCTIONAL_ASSESSMENT: 0-10

## 2025-03-01 NOTE — NURSING NOTE
Discharge instructions reviewed with patient. No questions at this time. Education given for new homegoing medications with type, uses, and most common side effects. Patient verbalized understanding. Handout on chest pain given. Telemetry removed and left at nurses station, masimo removed and left at bedside. IV removed. Discharged home in stable condition.

## 2025-03-01 NOTE — DISCHARGE SUMMARY
[facility] Hospitalist  Discharge Summary with Discharge Day Progress Note and Transition Note                 Jenn Antunez                  : 1955                      MRN:  [id number]     ADMIT DATE: 2025            DISCHARGE DATE:  3/1/2025    LOS: 0  day(s) since admission     PRIMARYCARE PHYSICIAN:  Devin Novak DO     VISIT STATUS: Observation     CODE STATUS:  Full Code     DISCHARGE DIAGNOSES:    Assessment & Plan  Chest pain       HOSPITAL COURSE:  Jenn Antunez is a 70 y.o. female with PMH of asthma and GERD.  Patient was recently admitted (2025) at [facility] for influenza A complicated by asthma exacerbation and currently being treated for secondary bacterial bronchitis outpatient by PCP on steroid taper and doxycycline.  Patient presented from home with severe substernal chest pain radiating to the shoulder blade and right jaw shortly after waking up in the morning to get ready for the day.  Patient describes pain as squeezing sensation.  Patient initially thought symptoms were due to her GERD and took Tums without any relief.  Symptoms significantly improved following administration of aspirin and nitroglycerin by EMS.  Patient denies worsening factors.  Denies dyspnea, palpitation, nausea, emesis, LE edema, fever, rigor. ROS is positive for ongoing yellow productive cough since influenza infection in early February.     In ED VSS.  Labs are grossly unrevealing including negative troponin x 2.  No acute ST changes on ECG including malignant arrhythmia.  Chest x-ray negative for acute findings.  CT chest abdomen pelvis was negative for acute findings including dissection.  However there is incidental mild ectasia of the ascending aorta up to 3.8 cm. Stable mild left-sided hydronephrosis. Hepatic steatosis.     Patient was admitted for chest pain workup. Cardiac stress test and CLIFFORD were unremarkable for acute ischemic event. Patient's presenting chest pain symptoms likely  non-cardiac in nature likely related to persistent coughing secondary to bronchitis. Patient was placed on cocktail of antitussives and discharged home with instructions to follow up with her pulmonologist and PCP     #MSK/non-cardiac Chest pain   #Persistent cough and pain aggravated by coughing  #Oropharyngeal candidiasis/Thrush likely due to prolonged steroid use  #Recent acute bronchitis on steroid taper and doxycycline  #GERD on Protonix  #Asthma w/o exacerbation  #Incidental mild ectasia of the ascending aorta up to 3.8 cm- F/U with PCP for referral to vascular  #Stable hepatic steatosis-F/U with PCP  #Stable mild left-sided hydronephrosis-F/U with PCP for referral to urology    PROCEDURES:  Nuclear cardiac stress test  CONSULTANTS:  none    COMPLEXITY OF FOLLOW UP:  [x] Moderate Complexity: follow up within 7-14 calendar days (35447)  []Severe Complexity: follow up within 7 calendar days (92132)     FOLLOW UP TESTING, PENDING RESULTS ORREFERRALS AT TRANSITIONAL CARE VISIT:   [x]Yes  -F/U with PCP and cards  -Incidental mild ectasia of the ascending aorta up to 3.8 cm- F/U with PCP for referral to vascular  -Stable hepatic steatosis-F/U with PCP  -Stable mild left-sided hydronephrosis-F/U with PCP for referral to urology   [] No    DAY OF DISCHARGE:  Review of Systems   All other systems reviewed and are negative.       Patient Vitals for the past 24 hrs:   BP Temp Temp src Pulse Resp SpO2 Weight   03/01/25 0729 133/78 37 °C (98.6 °F) -- 70 -- 94 % --   03/01/25 0636 -- -- -- -- -- -- 77.2 kg (170 lb 4.8 oz)   03/01/25 0405 126/67 36.7 °C (98.1 °F) Temporal 61 13 95 % --   02/28/25 2330 115/66 36.7 °C (98.1 °F) -- 68 16 93 % --   02/28/25 1940 116/67 -- -- 77 20 96 % --   02/28/25 1523 143/64 37 °C (98.6 °F) Temporal 64 16 96 % --   02/28/25 1151 -- -- -- 68 -- 95 % --   02/28/25 1128 143/71 36.7 °C (98.1 °F) Temporal 63 20 95 % --        Average, Min, and Max forlast 24 hours Vitals:  TEMPERATURE:  Temp  Avg:  36.8 °C (98.3 °F)  Min: 36.7 °C (98.1 °F)  Max: 37 °C (98.6 °F)     RESPIRATIONS RANGE: Resp  Av  Min: 13  Max: 20     PULSE RANGE: Pulse  Av.3  Min: 61  Max: 77     BLOOD PRESSURE RANGE:  Systolic (24hrs), Av , Min:115 , Max:143   ; Diastolic (24hrs), Av, Min:64, Max:78       PULSE OXIMETRYRANGE: SpO2  Av.9 %  Min: 93 %  Max: 96 %  Body mass index is 30.17 kg/m².     I/O last 3 completed shifts:  In: 240 (3.1 mL/kg) [P.O.:240]  Out: - (0 mL/kg)   Weight: 77.2 kg      Physical Exam  Vitals and nursing note reviewed.   Constitutional:       Appearance: Normal appearance.   HENT:      Head: Normocephalic and atraumatic.      Right Ear: External ear normal.      Left Ear: External ear normal.      Nose: Nose normal.      Mouth/Throat:      Mouth: Mucous membranes are moist.      Pharynx: Oropharyngeal exudate (white) present.   Eyes:      General: No scleral icterus.        Right eye: No discharge.         Left eye: No discharge.      Extraocular Movements: Extraocular movements intact.      Conjunctiva/sclera: Conjunctivae normal.      Pupils: Pupils are equal, round, and reactive to light.   Cardiovascular:      Rate and Rhythm: Normal rate and regular rhythm.   Pulmonary:      Effort: Pulmonary effort is normal.      Breath sounds: Normal breath sounds.   Abdominal:      General: Abdomen is flat. Bowel sounds are normal.      Palpations: Abdomen is soft.   Musculoskeletal:         General: Normal range of motion.      Thoracic back: Spasms and tenderness (between shoulder blades) present.      Right lower leg: No edema.      Left lower leg: No edema.   Skin:     General: Skin is warm and dry.      Capillary Refill: Capillary refill takes less than 2 seconds.   Neurological:      General: No focal deficit present.   Psychiatric:         Mood and Affect: Mood normal.         Thought Content: Thought content normal.         Judgment: Judgment normal.           DISCHARGE MEDICATIONS:      Significant Medication Changes:         Your medication list        START taking these medications        Instructions Last Dose Given Next Dose Due   acetaminophen 500 mg tablet  Commonly known as: Tylenol      Take 2 tablets (1,000 mg) by mouth every 8 hours if needed (Pain).       acetaminophen-codeine 300-30 mg tablet  Commonly known as: Tylenol w/ Codeine #3      Take 1 tablet by mouth every 8 hours if needed (Intractable cough).       benzonatate 200 mg capsule  Commonly known as: Tessalon      Take 1 capsule (200 mg) by mouth every 8 hours. Do not crush or chew.       dextromethorphan-guaifenesin  mg 12 hr tablet  Commonly known as: Mucinex DM      Take 1 tablet by mouth every 12 hours. Do not crush, chew, or split.       fluconazole 100 mg tablet  Commonly known as: Diflucan      Take 1 tablet (100 mg) by mouth once daily for 13 days. Take one tab now. Repeat in 7 days if symptoms persist.       methocarbamol 500 mg tablet  Commonly known as: Robaxin      Take 1 tablet (500 mg) by mouth 4 times a day as needed for muscle spasms (muscle spasm and/or muscle pain).              CONTINUE taking these medications        Instructions Last Dose Given Next Dose Due   AIRBORNE (ASCORBIC ACID) ORAL           albuterol 90 mcg/actuation inhaler  Commonly known as: Ventolin HFA      Inhale 2 puffs every 4 hours if needed for wheezing or shortness of breath.       bimatoprost 0.03 % ophthalmic solution  Commonly known as: Latisse           cholecalciferol 25 MCG (1000 UT) capsule  Commonly known as: Vitamin D-3           clotrimazole 10 mg claire  Commonly known as: Mycelex           fluticasone propion-salmeteroL 115-21 mcg/actuation inhaler  Commonly known as: Advair HFA      Inhale 2 puffs every 12 hours.       lactobacillus acidophilus capsule           olopatadine 0.1 % ophthalmic solution  Commonly known as: Patanol           pantoprazole 40 mg EC tablet  Commonly known as: ProtoNix      TAKE 1 TABLET BY  MOUTH EVERY DAY       predniSONE 10 mg tablet  Commonly known as: Deltasone      Take 4 tabs (40mg) daily for 3 days, then 3 tabs (30mg) daily for 3 days,  2 tabs (20mg) daily for 3 days,  1 tab (10 mg) daily for 3 days.       QUERCETIN ORAL           tretinoin 0.05 % cream  Commonly known as: Retin-A           urea 40 % lotion                  STOP taking these medications      guaiFENesin 1,200 mg tablet extended release 12hr  Commonly known as: Mucinex        levoFLOXacin 250 mg tablet  Commonly known as: Levaquin               ASK your doctor about these medications        Instructions Last Dose Given Next Dose Due   doxycycline 100 mg tablet  Commonly known as: Adoxa      Take 1 tablet (100 mg) by mouth 2 times a day for 7 days. Take with a full glass of water and do not lie down for at least 30 minutes after                 Where to Get Your Medications        These medications were sent to [facility]/pharmacy #[id number] - [place], [place] - [address] AT [facility]  [address], [facility] [address]      Phone: [phone]   acetaminophen-codeine 300-30 mg tablet  benzonatate 200 mg capsule  dextromethorphan-guaifenesin  mg 12 hr tablet  fluconazole 100 mg tablet  methocarbamol 500 mg tablet       You can get these medications from any pharmacy    You don't need a prescription for these medications  acetaminophen 500 mg tablet            DIET: regular      DISPOSITION:  Home     Follow up with Devin Novak DO  .         DISCHARGE TIME: > 30 minutes    Electronically signed by Medina Frederick DO on 03/01/25 at 11:00 AM    Dictated using [employer] Version 2.4  Proof read however unrecognized voice recognition errors may have occurred

## 2025-03-03 ENCOUNTER — PATIENT OUTREACH (OUTPATIENT)
Dept: PRIMARY CARE | Facility: CLINIC | Age: 70
End: 2025-03-03
Payer: MEDICARE

## 2025-03-03 NOTE — PROGRESS NOTES
Discharge Facility: [facility]       Discharge Diagnosis:    Chest pain       Admission Date: 2/28/2025   Discharge Date:  3/1/2025     PCP Appointment Date: Tasked to office     Specialist Appointment Date:      follow up with her pulmonologist     Cardiology F/U     F/U with PCP for referral to urology     F/U with PCP for referral to vascular     Hospital Encounter and Summary Linked: Yes    ED to Hosp-Admission (Discharged) with Medina Frederick DO; Dianne Blum DO (02/28/2025)     See discharge assessment below for further details     Wrap Up  Wrap Up Additional Comments: Patient states her cough is much better with medication, will bozena with pulmonary to further discuss Urology / Vascular  with PCP. Patient aware to call providers for any questions concerns or change in condition (3/3/2025 11:47 AM)    Engagement  Call Start Time: 1147 (3/3/2025 11:47 AM)    Medications  Medications reviewed with patient/caregiver?: Yes (3/3/2025 11:47 AM)  Is the patient having any side effects they believe may be caused by any medication additions or changes?: No (3/3/2025 11:47 AM)  Does the patient have all medications ordered at discharge?: -- (Patient  only some meds for right now) (3/3/2025 11:47 AM)  Care Management Interventions: No intervention needed (3/3/2025 11:47 AM)  Prescription Comments: acetaminophen-codeine 300-30 mg tablet  benzonatate 200 mg capsule  dextromethorphan-guaifenesin  mg 12 hr tablet  fluconazole 100 mg tablet  methocarbamol 500 mg tablet (3/3/2025 11:47 AM)  Care Management Interventions: Provided patient education (3/3/2025 11:47 AM)  Medication Comments: STOP taking:  guaiFENesin 1,200 mg tablet extended release  12hr (Mucinex)   levoFLOXacin 250 mg tablet (Levaquin) (3/3/2025 11:47 AM)    Appointments  Does the patient have a primary care provider?: Yes (3/3/2025 11:47 AM)  Care Management Interventions: Educated patient on importance of making appointment (3/3/2025 11:47 AM)  Care  Management Interventions: Advised to schedule with specialist (3/3/2025 11:47 AM)    Self Management  What is the home health agency?: NA (3/3/2025 11:47 AM)  What Durable Medical Equipment (DME) was ordered?: NA (3/3/2025 11:47 AM)    Patient Teaching  Does the patient have access to their discharge instructions?: Yes (3/3/2025 11:47 AM)  Care Management Interventions: Reviewed instructions with patient (3/3/2025 11:47 AM)  What is the patient's perception of their health status since discharge?: Improving (3/3/2025 11:47 AM)  Is the patient/caregiver able to teach back the hierarchy of who to call/visit for symptoms/problems? PCP, Specialist, Home Health nurse, Urgent Care, ED, 911: Yes (3/3/2025 11:47 AM)

## 2025-03-05 ENCOUNTER — TELEPHONE (OUTPATIENT)
Dept: PRIMARY CARE | Facility: CLINIC | Age: 70
End: 2025-03-05
Payer: MEDICARE

## 2025-03-05 NOTE — TELEPHONE ENCOUNTER
Patient was recently in the hospital.  Was put on some new meds that is causing reflux to be worse.  She is currently taking Protonix.  Was on a steroid, still using Tessalon Perles, and Fluconazole.  Would those meds cause worse reflux.  Extreme burning.  Fungus caused by steroid.    Patient concerned because not why she is being referred to urologist and vascular.  She is scheduled for 3/13/25 for TCM

## 2025-03-07 ENCOUNTER — TELEPHONE (OUTPATIENT)
Dept: PRIMARY CARE | Facility: CLINIC | Age: 70
End: 2025-03-07
Payer: MEDICARE

## 2025-03-07 DIAGNOSIS — R32 URINARY INCONTINENCE, UNSPECIFIED TYPE: Primary | ICD-10-CM

## 2025-03-07 NOTE — TELEPHONE ENCOUNTER
Patient wants to know if you could put the referral in for the urologist?  She is coming in next week for hospital follow up.  Patient states she tried calling some yesterday but wanted to check in with you.  I did explain most wont schedule her without a referral from her PCP. Should I also give her Dr. Stock name and number.

## 2025-03-10 ENCOUNTER — APPOINTMENT (OUTPATIENT)
Dept: ALLERGY | Facility: CLINIC | Age: 70
End: 2025-03-10
Payer: MEDICARE

## 2025-03-10 DIAGNOSIS — Z86.0100 HX OF COLONIC POLYPS: Primary | ICD-10-CM

## 2025-03-10 DIAGNOSIS — Z12.11 COLON CANCER SCREENING: ICD-10-CM

## 2025-03-10 DIAGNOSIS — J30.89 ALLERGIC RHINITIS DUE TO OTHER ALLERGIC TRIGGER, UNSPECIFIED SEASONALITY: ICD-10-CM

## 2025-03-10 PROCEDURE — 95117 IMMUNOTHERAPY INJECTIONS: CPT | Performed by: ALLERGY & IMMUNOLOGY

## 2025-03-10 RX ORDER — POLYETHYLENE GLYCOL 3350, SODIUM SULFATE ANHYDROUS, SODIUM BICARBONATE, SODIUM CHLORIDE, POTASSIUM CHLORIDE 236; 22.74; 6.74; 5.86; 2.97 G/4L; G/4L; G/4L; G/4L; G/4L
POWDER, FOR SOLUTION ORAL
Qty: 4000 ML | Refills: 0 | Status: SHIPPED | OUTPATIENT
Start: 2025-03-10

## 2025-03-12 ENCOUNTER — APPOINTMENT (OUTPATIENT)
Dept: PULMONOLOGY | Facility: CLINIC | Age: 70
End: 2025-03-12
Payer: MEDICARE

## 2025-03-13 ENCOUNTER — APPOINTMENT (OUTPATIENT)
Dept: PRIMARY CARE | Facility: CLINIC | Age: 70
End: 2025-03-13
Payer: MEDICARE

## 2025-03-13 VITALS
SYSTOLIC BLOOD PRESSURE: 118 MMHG | WEIGHT: 164 LBS | OXYGEN SATURATION: 98 % | DIASTOLIC BLOOD PRESSURE: 62 MMHG | BODY MASS INDEX: 29.06 KG/M2 | HEIGHT: 63 IN | HEART RATE: 79 BPM

## 2025-03-13 DIAGNOSIS — Z09 HOSPITAL DISCHARGE FOLLOW-UP: Primary | ICD-10-CM

## 2025-03-13 ASSESSMENT — PATIENT HEALTH QUESTIONNAIRE - PHQ9
SUM OF ALL RESPONSES TO PHQ9 QUESTIONS 1 AND 2: 0
2. FEELING DOWN, DEPRESSED OR HOPELESS: NOT AT ALL
1. LITTLE INTEREST OR PLEASURE IN DOING THINGS: NOT AT ALL

## 2025-03-13 NOTE — PROGRESS NOTES
"Patient: Jenn Antunez  : 1955  PCP: Devin Novak DO  MRN: [id number]  Program: Transitional Care Management  Status: Enrolled  Effective Dates: 3/3/2025 - present  Responsible Staff: Lou Church  Social Drivers to be Addressed: Physical Activity, Social Connections, Stress         Jenn Antunez is a 70 y.o. female presenting today for follow-up after being discharged from the hospital 12 days ago. The main problem requiring admission was MSK/non-cardiac chest pain. The discharge summary and/or Transitional Care Management documentation was reviewed. Medication reconciliation was performed as indicated via the \"Arnel as Reviewed\" timestamp.     Jenn Antunez was contacted by Transitional Care Management services two days after her discharge. This encounter and supporting documentation was reviewed.    Admitted overnight from -3/1 for chest pain  Cardiac stress test and CLIFFORD done in hospital were unremarkable for acute ischemic event, very low suspicion for cardiac etiology  Doing better since discharge on 3/1, no additional chest pain since discharge, no SOB at rest or with exertion  Was discharged home with various antitussive agents, also with an antifungal for oral thrush present during admission, has since resolved   No other red flag signs or symptoms today  Is still coughing, was diagnosed with influenza A seven weeks ago, cough has improved since then but does still persist, antitussive medications didn't make her feel great so she is not taking them   Staying active is a priority. Goal is walking 1,000 miles this yr, biked 1,000 miles last year  Walks multiple miles daily  Has follow up with Dr. Goodson, cardiology scheduled, incidental mild ectasia of ascending aorta up to 3.8 cm detected at last admission    Has follow up with urology scheduled for stable mild left-sided hydronephrosis      Review of Systems  All pertinent positive symptoms are included in the history of present " "illness.  All other systems have been reviewed and are negative and noncontributory to this patient's current ailments.       /62   Pulse 79   Ht 1.6 m (5' 3\")   Wt 74.4 kg (164 lb)   SpO2 98%   BMI 29.05 kg/m²     Physical Exam  CONSTITUTIONAL - well nourished, well developed, looks like stated age, in no acute distress, not ill-appearing, and not tired appearing  SKIN - normal skin color and pigmentation, normal skin turgor without rash, lesions, or nodules visualized  HEAD - no trauma, normocephalic  EYES - normal external exam  CHEST - clear to auscultation, no wheezing, no crackles and no rales, good effort  CARDIAC - regular rate and regular rhythm, no skipped beats, no murmur  EXTREMITIES - no obvious or evident edema, no obvious or evident deformities  NEUROLOGICAL - alert, oriented and no focal signs  PSYCHIATRIC - alert, pleasant and cordial, age-appropriate    The complexity of medical decision making for this patient's transitional care is moderate.    Assessment/Plan   Problem List Items Addressed This Visit             ICD-10-CM    Hospital discharge follow-up - Primary Z09     Assessment: Patient is stable with no signs of distress or complications.  No recurrence of chest pain or other concerning symptoms.  Physical exam was reassuring with no abnormalities noted.  No red flag signs or symptoms.  Follow-up appointments have been scheduled with specialists for further evaluation and management.    Plan: Patient understands and can manage all prescribed medications. Patient to follow-up with cardiology for incidental ectasia of the ascending aorta detected at last admission, follow-up with urology for stable left sided hydronephrosis.  Has adequate support and resources at home. Return/emergency department precautions discussed with patient who voiced awareness and agreement.           Patient understands and is agreeable with current plan. All questions were answered at this visit. Please " follow up with the office if any additional questions arise.      Follow up as needed or at your upcoming Medicare wellness visit.     Eligio Klein,   PGY-1, Family Medicine

## 2025-03-13 NOTE — PROGRESS NOTES
"How feeling now? Any more chest pain events? None, no SOB  SOB, diaphoresis, LH, HA   On any new meds at home?   Goal is walking 1,000 miles this yr, biked 1,000 miles last year   Chest pain, given nitrogylcerin and aspirin felt better   Admitted from -3/1     Cardiac stress test and CLIFFORD were unremarkable for acute ischemic event. Patient's presenting chest pain symptoms likely non-cardiac in nature likely related to persistent coughing secondary to bronchitis. Patient was placed on cocktail of antitussives and discharged home with instructions to follow up with her pulmonologist and PCP     Still coughing     #Incidental mild ectasia of the ascending aorta up to 3.8 cm- F/U with PCP for referral to vascular     #Stable hepatic steatosis-F/U with PCP  #Stable mild left-sided hydronephrosis-F/U with PCP for referral to urology    Patient: Jenn Antunez  : 1955  PCP: Devin Novak DO  MRN: [id number]  Program: Transitional Care Management  Status: Enrolled  Effective Dates: 3/3/2025 - present  Responsible Staff: Lou Church  Social Drivers to be Addressed: Physical Activity, Social Connections, Stress         Jenn Antunez is a 70 y.o. female presenting today for follow-up after being discharged from the hospital {Numbers; -:66585} days ago. The main problem requiring admission was ***. The discharge summary and/or Transitional Care Management documentation was reviewed. Medication reconciliation was performed as indicated via the \"Arnel as Reviewed\" timestamp.     Jenn Antunez was contacted by Transitional Care Management services two days after her discharge. This encounter and supporting documentation was reviewed.    Review of Systems    /62   Pulse 79   Ht 1.6 m (5' 3\")   Wt 74.4 kg (164 lb)   SpO2 98%   BMI 29.05 kg/m²     Physical Exam    The complexity of medical decision making for this patient's transitional care is {DESC; MODERATE/HIGH:29715}.    Assessment/Plan "   [facility] [place]}

## 2025-03-14 ENCOUNTER — TELEPHONE (OUTPATIENT)
Dept: PRIMARY CARE | Facility: CLINIC | Age: 70
End: 2025-03-14

## 2025-03-14 ENCOUNTER — OFFICE VISIT (OUTPATIENT)
Dept: CARDIOLOGY | Facility: HOSPITAL | Age: 70
End: 2025-03-14
Payer: MEDICARE

## 2025-03-14 VITALS
OXYGEN SATURATION: 97 % | HEART RATE: 68 BPM | SYSTOLIC BLOOD PRESSURE: 134 MMHG | DIASTOLIC BLOOD PRESSURE: 80 MMHG | WEIGHT: 166.89 LBS | BODY MASS INDEX: 29.56 KG/M2

## 2025-03-14 DIAGNOSIS — I71.21 ANEURYSM OF ASCENDING AORTA WITHOUT RUPTURE (CMS-HCC): Primary | ICD-10-CM

## 2025-03-14 PROBLEM — Z09 HOSPITAL DISCHARGE FOLLOW-UP: Status: ACTIVE | Noted: 2025-03-14

## 2025-03-14 PROCEDURE — 1036F TOBACCO NON-USER: CPT | Performed by: INTERNAL MEDICINE

## 2025-03-14 PROCEDURE — 99214 OFFICE O/P EST MOD 30 MIN: CPT | Performed by: INTERNAL MEDICINE

## 2025-03-14 PROCEDURE — 1159F MED LIST DOCD IN RCRD: CPT | Performed by: INTERNAL MEDICINE

## 2025-03-14 NOTE — PROGRESS NOTES
Primary Care Physician: Devin Novak DO   Date of Visit: 03/14/2025  9:30 AM EDT  Type of Visit: New patient visit    Chief Complaint:  Numerous, see below    HPI  Jenn Antunez 70 y.o. female who presents to follow-up.    Admitted from 2/28/2025-3/1/2025 because had severe pain, between shoulder blades and chest, debilitated.  Determined to be secondary to chronic cough, that developed after influenza A.  Additionally, diagnosed with oral candidiasis.  She had noncardiac pain but did have a cardiac workup-echocardiogram with structurally normal heart and pharmacologic MPI was low risk.    She is back to walking 5 miles daily.    Was diagnosed with mild ectasia of her aorta and the follow-up with me was arranged.    Denies any angina, shortness of breath, paroxysmal nocturnal dyspnea, orthopnea, peripheral edema, near-syncope, or syncope.  Still has occasional palpitations.    Review of Systems   12 point review of systems was obtained in detail and is negative other than that noted above or below.     Past Medical/Surgical History:  Jenn has a past medical history of Personal history of other diseases of the digestive system (09/20/2013).    Jenn has a past surgical history that includes Other surgical history (11/11/2022) and Other surgical history (11/11/2022).    Social/Family History:  Jenn reports that she has never smoked. She has never used smokeless tobacco. She reports that she does not currently use alcohol. She reports that she does not use drugs.    Jenn's family history includes Lung cancer in her father; m aunt breast cancer hx in an other family member.    Allergies:  Allergies   Allergen Reactions    Azithromycin Unknown    Erythromycin Other    Levaquin [Levofloxacin] Other     Chest pain    Nitrofurantoin Macrocrystal Other    Nitrofurantoin Macrocrystalline Unknown     Stomach upset    Nitrofurantoin Monohyd/M-Cryst Unknown    Sulfa (Sulfonamide Antibiotics) Swelling    Sulfur  Swelling       Medications:  Current Outpatient Medications   Medication Sig Dispense Refill    albuterol (Ventolin HFA) 90 mcg/actuation inhaler Inhale 2 puffs every 4 hours if needed for wheezing or shortness of breath. 36 g 11    cholecalciferol (Vitamin D-3) 25 MCG (1000 UT) capsule Take 1 capsule (25 mcg) by mouth once daily.      dextromethorphan-guaifenesin (Mucinex DM)  mg 12 hr tablet Take 1 tablet by mouth every 12 hours. Do not crush, chew, or split. 60 tablet 2    pantoprazole (ProtoNix) 40 mg EC tablet TAKE 1 TABLET BY MOUTH EVERY DAY 90 tablet 0    acetaminophen (Tylenol) 500 mg tablet Take 2 tablets (1,000 mg) by mouth every 8 hours if needed (Pain). (Patient not taking: Reported on 3/14/2025)      acetaminophen-codeine (Tylenol w/ Codeine #3) 300-30 mg tablet Take 1 tablet by mouth every 8 hours if needed (Intractable cough). (Patient not taking: Reported on 3/14/2025) 20 tablet 0    benzonatate (Tessalon) 200 mg capsule Take 1 capsule (200 mg) by mouth every 8 hours. Do not crush or chew. (Patient not taking: Reported on 3/14/2025) 90 capsule 1    bimatoprost (Latisse) 0.03 % ophthalmic solution Administer 1 drop into both eyes once daily at bedtime. Place 1 drop on applicator and apply along skin of upper eyelid at base of eyelashes daily at bedtime; rpt for 2nd eye with clean applicator (Patient not taking: Reported on 3/14/2025)      clotrimazole (Mycelex) 10 mg claire Take 1 tablet (10 mg) by mouth 4 times a day as needed. (Patient not taking: Reported on 3/13/2025)      fluconazole (Diflucan) 100 mg tablet Take 1 tablet (100 mg) by mouth once daily for 13 days. Take one tab now. Repeat in 7 days if symptoms persist. (Patient not taking: Reported on 3/14/2025) 13 tablet 0    fluticasone propion-salmeteroL (Advair HFA) 115-21 mcg/actuation inhaler Inhale 2 puffs every 12 hours. (Patient not taking: Reported on 3/13/2025) 12 g 11    lactobacillus acidophilus capsule Take 1 capsule by mouth  once daily. (Patient not taking: Reported on 3/13/2025)      methocarbamol (Robaxin) 500 mg tablet Take 1 tablet (500 mg) by mouth 4 times a day as needed for muscle spasms (muscle spasm and/or muscle pain). (Patient not taking: Reported on 3/14/2025) 60 tablet 0    multivit-min/vit C/herb no.124 (AIRBORNE, ASCORBIC ACID, ORAL) Take 1 tablet by mouth once daily. (Patient not taking: Reported on 3/13/2025)      olopatadine (Patanol) 0.1 % ophthalmic solution Administer 1 drop into both eyes 2 times a day as needed for allergies. (Patient not taking: Reported on 3/14/2025)      polyethylene glycol (Golytely) 236-22.74-6.74 -5.86 gram solution STARTING AT 6PM DRINK HALF OF THE BOTTLE, DRINK THE OTHER HALF 5 HRS BEFORE PROCEDURE TIME (Patient not taking: Reported on 3/14/2025) 4000 mL 0    predniSONE (Deltasone) 10 mg tablet Take 4 tabs (40mg) daily for 3 days, then 3 tabs (30mg) daily for 3 days,  2 tabs (20mg) daily for 3 days,  1 tab (10 mg) daily for 3 days. (Patient not taking: Reported on 3/13/2025) 30 tablet 0    QUERCETIN ORAL Take 1 capsule by mouth once daily. (Patient not taking: Reported on 3/13/2025)      tretinoin (Retin-A) 0.05 % cream APPLY PEA SIZED AMOUNT TO FACE EVERY NIGHT (Patient not taking: Reported on 3/14/2025)      urea 40 % lotion APPLY TO AFFECTED AREAS EVERY DAY (Patient not taking: Reported on 3/14/2025)       No current facility-administered medications for this visit.       Objective:   Vitals:    03/14/25 0914   BP: 134/80   Pulse: 68   SpO2: 97%       S1-S2 normal, regular rate and rhythm, no murmurs, rubs, or gallops, no carotid bruit  Clear to auscultation bilaterally    Labs and Imaging:      Latest Ref Rng & Units 3/6/2024    11:37 AM 9/19/2024    10:43 PM 2/6/2025    12:58 AM 2/7/2025     7:24 AM 2/8/2025     7:08 AM 2/28/2025     7:59 AM 3/1/2025     7:04 AM   Electrolytes   Na 136 - 145 mmol/L 142  141  141  143  141  140  138    K 3.5 - 5.3 mmol/L 4.2  3.7  3.5  4.0  3.4  3.9   3.8    Cl 98 - 107 mmol/L 104  106  103  108  105  103  103    CO2 21 - 32 mmol/L 30  25  23  24  25  27  26    Cr 0.50 - 1.05 mg/dL 0.63  0.60  0.77  0.49  0.58  0.77  0.64    Ca 8.6 - 10.3 mg/dL 9.3  8.7  9.1  8.9  8.0  9.2  8.7    Phos 2.5 - 4.9 mg/dL    2.4  2.5   3.3          Latest Ref Rng & Units 10/18/2023     8:40 AM 9/19/2024    10:43 PM 2/6/2025    12:58 AM 2/7/2025     7:24 AM 2/8/2025     7:08 AM 2/28/2025     7:59 AM 3/1/2025     7:04 AM   CBC   Hb 12.0 - 16.0 g/dL 12.6  12.5  12.9  12.3  12.0  13.5  13.2    Hct 36.0 - 46.0 % 39.1  37.5  39.0  36.4  36.8  40.1  38.8    MCV 80 - 100 fL 94  90  91  88  91  89  89    RDW 11.5 - 14.5 % 14.0  13.6  14.0  13.7  14.0  14.7  14.6          Latest Ref Rng & Units 5/22/2023     8:11 AM 10/18/2023     8:40 AM 3/6/2024    11:37 AM 9/19/2024    10:43 PM 2/6/2025    12:58 AM 2/28/2025     7:59 AM 2/28/2025     9:02 AM   Lipids   Chol 0 - 199 mg/dL  181      193    HDL mg/dL  45.1      58.6    LDL <=99 mg/dL  108      106    Trig 0 - 149 mg/dL  139      141    ALT 7 - 45 U/L 22   60  23  43  32     AST 9 - 39 U/L 18   41  17  31  15           Latest Ref Rng & Units 6/16/2018    11:00 AM 3/11/2020     7:40 PM 7/29/2021     8:47 AM 4/5/2022     4:58 PM 5/13/2023    11:10 AM 10/18/2023     8:40 AM 12/17/2024    11:56 AM   Thyroid   TSH 0.44 - 3.98 mIU/L 1.76  2.96  1.98  1.06  1.57  1.73  1.00           No data to display                 Lab Results   Component Value Date    HGBA1C 5.8 (H) 02/28/2025    HGBA1C 5.7 (H) 10/18/2023        The 10-year ASCVD risk score (Nuria DURAN, et al., 2019) is: 11.3%    Values used to calculate the score:      Age: 70 years      Sex: Female      Is Non- : No      Diabetic: No      Tobacco smoker: No      Systolic Blood Pressure: 134 mmHg      Is BP treated: No      HDL Cholesterol: 58.6 mg/dL      Total Cholesterol: 193 mg/dL     Echocardiogram: No results found for this or any previous visit.     Echocardiogram:    PHYSICIAN INTERPRETATION:  Left Ventricle: The left ventricular systolic function is normal, with an estimated ejection fraction of 65-70%. There are no regional wall motion abnormalities. The left ventricular cavity size is normal. There is mild left ventricular hypertrophy involving the basal wall and septal wall. Spectral Doppler shows an impaired relaxation pattern of left ventricular diastolic filling.  Left Atrium: The left atrium is upper limits of normal in size.  Right Ventricle: The right ventricle is normal in size. There is normal right ventricular global systolic function.  Right Atrium: The right atrium is normal in size.  Aortic Valve: The aortic valve appears structurally normal. There is no evidence of aortic valve regurgitation. The peak instantaneous gradient of the aortic valve is 12.1 mmHg. The mean gradient of the aortic valve is 7.0 mmHg.  Mitral Valve: The mitral valve is normal in structure. There is trace to mild mitral valve regurgitation.  Tricuspid Valve: The tricuspid valve is structurally normal. There is mild tricuspid regurgitation.  Pulmonic Valve: The pulmonic valve is structurally normal. There is physiologic pulmonic valve regurgitation.  Pericardium: There is a trivial pericardial effusion posterior to the left ventricle.  Aorta: The aortic root is normal.  Pulmonary Artery: The tricuspid regurgitant velocity is 2.68 m/s, and with an estimated right atrial pressure of 3 mmHg, the estimated pulmonary artery pressure is normal with the RVSP at 28.4 mmHg.  Pulmonary Veins: The pulmonary veins appear normal and return normally to the left atrium.  Systemic Veins: The inferior vena cava appears to be of normal size.      CONCLUSIONS:  1. The left ventricular systolic function is normal with a 65-70% estimated ejection fraction.  2. Spectral Doppler shows an impaired relaxation pattern of left ventricular diastolic filling.      Stress Testing: No results found for this or any  previous visit from the past 1825 days.    Cardiac Catheterization: No results found for this or any previous visit from the past 1825 days.    Cardiac Scoring: No results found for this or any previous visit from the past 1825 days.    AAA : No results found for this or any previous visit from the past 1825 days.    OTHER: No results found for this or any previous visit from the past 1825 days.        Assessment/Plan:   No diagnosis found.       Jenn Antunez 70 y.o. female who presents to [facility] care:    1.  Ascending aortic aneurysm  2.  Palpitations    Ascending aorta was around 3.5 on echocardiogram from 2024 and 3.8 on CTA chest abdomen pelvis from her hospital admission.    Blood pressures under control, cholesterol managed by her primary care physician and she would prefer to avoid statin therapy if possible, made some lifestyle changes 2 years ago with marked improvement of her cholesterol with her current LDLs in the low 100s.  Encouraged plant-based diet, increased intensity of her walk so that she is at least performing 30 minutes of moderate intensity aerobic exercise daily.  Ultimately, would like to set LDL goal of less than 100 long-term for her.  Will give her some time to make his lifestyle changes given she does not want to take statin medication.    Return to clinic in 1 year.  ____________________________________________________________  Luis Goodson MD

## 2025-03-14 NOTE — ASSESSMENT & PLAN NOTE
Assessment: Patient is stable with no signs of distress or complications.  No recurrence of chest pain or other concerning symptoms.  Physical exam was reassuring with no abnormalities noted.  No red flag signs or symptoms.  Follow-up appointments have been scheduled with specialists for further evaluation and management.    Plan: Patient understands and can manage all prescribed medications. Patient to follow-up with cardiology for incidental ectasia of the ascending aorta detected at last admission, follow-up with urology for stable left sided hydronephrosis.  Has adequate support and resources at home. Return/emergency department precautions discussed with patient who voiced awareness and agreement.

## 2025-03-14 NOTE — TELEPHONE ENCOUNTER
Patient was here yesterday , now she noticed this morning when she coughs it is now green.  She asked about Amoxil.

## 2025-03-17 ENCOUNTER — PATIENT OUTREACH (OUTPATIENT)
Dept: PRIMARY CARE | Facility: CLINIC | Age: 70
End: 2025-03-17
Payer: MEDICARE

## 2025-03-17 NOTE — PROGRESS NOTES
Call regarding appt. with PCP on 3/14/2025 after hospitalization.    At time of outreach call the patient feels as if their condition has improved  since last visit.    Reviewed the PCP appointment with the pt and addressed any questions or concerns.     Encouraged to call providers for any questions concerns or change in condition

## 2025-03-26 ENCOUNTER — PATIENT OUTREACH (OUTPATIENT)
Dept: PRIMARY CARE | Facility: CLINIC | Age: 70
End: 2025-03-26
Payer: MEDICARE

## 2025-03-31 ENCOUNTER — APPOINTMENT (OUTPATIENT)
Dept: UROLOGY | Facility: CLINIC | Age: 70
End: 2025-03-31
Payer: MEDICARE

## 2025-04-05 ENCOUNTER — ANCILLARY PROCEDURE (OUTPATIENT)
Dept: URGENT CARE | Age: 70
End: 2025-04-05
Payer: MEDICARE

## 2025-04-05 ENCOUNTER — OFFICE VISIT (OUTPATIENT)
Dept: URGENT CARE | Age: 70
End: 2025-04-05
Payer: MEDICARE

## 2025-04-05 VITALS
BODY MASS INDEX: 29.41 KG/M2 | HEART RATE: 84 BPM | TEMPERATURE: 98.6 F | SYSTOLIC BLOOD PRESSURE: 137 MMHG | DIASTOLIC BLOOD PRESSURE: 81 MMHG | WEIGHT: 166 LBS | OXYGEN SATURATION: 95 %

## 2025-04-05 DIAGNOSIS — R09.89 CHEST CONGESTION: ICD-10-CM

## 2025-04-05 DIAGNOSIS — R05.2 SUBACUTE COUGH: Primary | ICD-10-CM

## 2025-04-05 PROCEDURE — 71046 X-RAY EXAM CHEST 2 VIEWS: CPT

## 2025-04-05 ASSESSMENT — ENCOUNTER SYMPTOMS: COUGH: 1

## 2025-04-05 NOTE — PROGRESS NOTES
Subjective   Patient ID: Jenn Antunez is a 70 y.o. female. They present today with a chief complaint of Cough (Pt states persistent cough x5days, yellow phlegm ).    History of Present Illness  Patient is a 70-year-old female with history of asthma and past pneumonia infections who presents urgent care today with concern for possible developing pneumonia.  She states she has had a cough which is intermittently productive for many many weeks.  She thought she was getting better but she spent several days in the hospital visiting her  and states her cough has returned.  She has been using her previously prescribed inhalers as well as Tessalon Perles with some relief.  She denies any fevers, chills, nausea, vomiting, chest pain or shortness of breath.  No other complaints or concerns mention at this time.      History provided by:  Patient  Cough        Past Medical History  Allergies as of 04/05/2025 - Reviewed 04/05/2025   Allergen Reaction Noted    Azithromycin Unknown 09/03/2023    Erythromycin Other 09/03/2023    Levaquin [levofloxacin] Other 02/28/2025    Nitrofurantoin macrocrystal Other 09/07/2022    Nitrofurantoin macrocrystalline Unknown 06/11/2012    Nitrofurantoin monohyd/m-cryst Unknown 09/03/2023    Sulfa (sulfonamide antibiotics) Swelling 09/03/2023    Sulfur Swelling 06/11/2012       (Not in a hospital admission)         Past Medical History:   Diagnosis Date    Personal history of other diseases of the digestive system 09/20/2013    History of esophageal reflux       Past Surgical History:   Procedure Laterality Date    OTHER SURGICAL HISTORY  11/11/2022    Appendectomy    OTHER SURGICAL HISTORY  11/11/2022    Biceps tendon rupture repair        reports that she has never smoked. She has never used smokeless tobacco. She reports that she does not currently use alcohol. She reports that she does not use drugs.    Review of Systems  Review of Systems   Respiratory:  Positive for cough.                                    Objective    Vitals:    04/05/25 1009   BP: 137/81   Pulse: 84   Temp: 37 °C (98.6 °F)   SpO2: 95%   Weight: 75.3 kg (166 lb)     No LMP recorded. Patient is postmenopausal.    Physical Exam  Vitals and nursing note reviewed.   Constitutional:       General: She is not in acute distress.     Appearance: Normal appearance. She is not ill-appearing, toxic-appearing or diaphoretic.   HENT:      Head: Normocephalic and atraumatic.      Nose: Nose normal.      Mouth/Throat:      Mouth: Mucous membranes are moist.   Eyes:      Extraocular Movements: Extraocular movements intact.      Conjunctiva/sclera: Conjunctivae normal.      Pupils: Pupils are equal, round, and reactive to light.   Cardiovascular:      Rate and Rhythm: Normal rate and regular rhythm.      Pulses: Normal pulses.      Heart sounds: Normal heart sounds.   Pulmonary:      Effort: Pulmonary effort is normal. No respiratory distress.      Breath sounds: Normal breath sounds. No stridor. No wheezing, rhonchi or rales.   Chest:      Chest wall: No tenderness.   Musculoskeletal:         General: Normal range of motion.      Cervical back: Normal range of motion and neck supple.   Skin:     General: Skin is warm and dry.      Capillary Refill: Capillary refill takes less than 2 seconds.   Neurological:      General: No focal deficit present.      Mental Status: She is alert and oriented to person, place, and time.   Psychiatric:         Mood and Affect: Mood normal.         Behavior: Behavior normal.         Procedures      Assessment/Plan   Allergies, medications, history, and pertinent labs/EKGs/Imaging reviewed by PENNY Artis.     Medical Decision Making    Patient is well appearing, afebrile, non toxic, not hypoxic, and appropriate for outpatient treatment and management at time of evaluation. Patient presents with ongoing cough.     Differential includes but not limited to: Bronchitis, pneumonia, URI,  other    Physical exam is unremarkable.  Lung sounds are clear in all fields bilaterally.  Vitals are within normal limits.  Oxygen saturation on room air is 95%.  Otherwise as described above.    Chest x-ray ordered.  Image independently reviewed by myself and interpreted as no acute cardiopulmonary process.    Suspect patient symptoms are secondary to bronchitis.    Counseling: Spoke with the patient and discussed today´s findings, in addition to providing specific details for the plan of care and expected course.  She has multiple inhalers at home including albuterol and Advair as well as Tessalon Perles.  We did discuss possible short course of prednisone but she states she was just on steroids for a month and would prefer not to take them again.  Patient was given the opportunity to ask questions.     Discussed return precautions and importance of follow-up. Advised to follow-up with PCP/pulmonologist. I specifically advised to go to the ED for changing or worsening symptoms, new symptoms, complaint specific precautions, and precautions listed on the discharge paperwork.    I advised the patient that the urgent care evaluation and treatment provided today doesn't end their need for medical care. It is very important that they follow-up with their primary care provider or other specialist as instructed.     The plan of care was mutually agreed upon with the patient. The patient and/or family were given the opportunity to ask questions. All questions and concerns were answered to the best of my ability with today's information.  Patient was discharged in stable condition.    Dictation software was used in the creation of this note which does not evaluate or correct for typographical, spelling, syntax or grammatical errors.    Orders and Diagnoses  There are no diagnoses linked to this encounter.    Medical Admin Record  === 04/05/25 ===    XR CHEST 2 VIEWS    - Impression -  1.  No evidence of acute  cardiopulmonary process.        MACRO:  None    Signed by: Becca Cook 4/5/2025 10:24 AM  Dictation workstation:   [id number]      Follow Up Instructions  No follow-ups on file.    Patient disposition: Home    Electronically signed by PENNY Artis  10:13 AM

## 2025-04-05 NOTE — PATIENT INSTRUCTIONS
You were seen at Urgent Care today for ongoing cough. Please treat as discussed. Please take medications as previously prescribed. Monitor for red flags which we spoke about, If your symptoms change, worsen or become concerning in any way, please go to the emergency room immediately, otherwise you can followup with your PCP/pulmonologist in 2-3 days as needed

## 2025-04-07 ENCOUNTER — APPOINTMENT (OUTPATIENT)
Dept: ALLERGY | Facility: CLINIC | Age: 70
End: 2025-04-07
Payer: MEDICARE

## 2025-04-07 DIAGNOSIS — J30.89 ALLERGIC RHINITIS DUE TO OTHER ALLERGIC TRIGGER, UNSPECIFIED SEASONALITY: ICD-10-CM

## 2025-04-07 PROCEDURE — 95117 IMMUNOTHERAPY INJECTIONS: CPT | Performed by: ALLERGY & IMMUNOLOGY

## 2025-04-09 ENCOUNTER — APPOINTMENT (OUTPATIENT)
Dept: SLEEP MEDICINE | Facility: CLINIC | Age: 70
End: 2025-04-09
Payer: MEDICARE

## 2025-04-10 ENCOUNTER — APPOINTMENT (OUTPATIENT)
Dept: PRIMARY CARE | Facility: CLINIC | Age: 70
End: 2025-04-10
Payer: MEDICARE

## 2025-04-14 ENCOUNTER — TELEPHONE (OUTPATIENT)
Facility: CLINIC | Age: 70
End: 2025-04-14
Payer: MEDICARE

## 2025-04-14 NOTE — PROGRESS NOTES
Chief Complaint Jenn Antunez is a 70 y.o. female who presents for URI symptoms    HPI:  Pt states that she had a hospital admission for Influenza A in February. Patient was given a medication then ended up back in the hospital a few days later due to an adverse reaction.  Patient stated that since having the influenza A everything feels flared. She has pain in her chest and she is not sleeping due to her cough. Patient is also complaining of coughing up green congestion.    ROS:  Review of Systems   Constitutional:  Positive for fatigue. Negative for chills and fever.   HENT:  Positive for congestion, postnasal drip, rhinorrhea, sinus pressure, sinus pain, sneezing and voice change. Negative for ear pain, sore throat and trouble swallowing.    Respiratory:  Positive for cough. Negative for shortness of breath and wheezing.    Cardiovascular:  Positive for chest pain.   Gastrointestinal:  Positive for nausea and vomiting. Negative for constipation and diarrhea.   Neurological:  Positive for headaches. Negative for dizziness, syncope and light-headedness.       Meds:    Current Outpatient Medications:     albuterol (Ventolin HFA) 90 mcg/actuation inhaler, Inhale 2 puffs every 4 hours if needed for wheezing or shortness of breath., Disp: 36 g, Rfl: 11    pantoprazole (ProtoNix) 40 mg EC tablet, TAKE 1 TABLET BY MOUTH EVERY DAY, Disp: 90 tablet, Rfl: 0    acetaminophen (Tylenol) 500 mg tablet, Take 2 tablets (1,000 mg) by mouth every 8 hours if needed (Pain). (Patient not taking: Reported on 3/14/2025), Disp: , Rfl:     acetaminophen-codeine (Tylenol w/ Codeine #3) 300-30 mg tablet, Take 1 tablet by mouth every 8 hours if needed (Intractable cough). (Patient not taking: Reported on 3/14/2025), Disp: 20 tablet, Rfl: 0    bimatoprost (Latisse) 0.03 % ophthalmic solution, Administer 1 drop into both eyes once daily at bedtime. Place 1 drop on applicator and apply along skin of upper eyelid at base of eyelashes daily at  bedtime; rpt for 2nd eye with clean applicator (Patient not taking: Reported on 3/14/2025), Disp: , Rfl:     cholecalciferol (Vitamin D-3) 25 MCG (1000 UT) capsule, Take 1 capsule (25 mcg) by mouth once daily., Disp: , Rfl:     clotrimazole (Mycelex) 10 mg claire, Take 1 tablet (10 mg) by mouth 4 times a day as needed. (Patient not taking: Reported on 3/13/2025), Disp: , Rfl:     codeine-guaifenesin (Robitussin-AC)  mg/5 mL syrup, Take 5 mL by mouth every 8 hours if needed for cough for up to 7 days., Disp: 118 mL, Rfl: 0    dextromethorphan-guaifenesin (Mucinex DM)  mg 12 hr tablet, Take 1 tablet by mouth every 12 hours. Do not crush, chew, or split., Disp: 60 tablet, Rfl: 2    dextromethorphan-guaifenesin (Robitussin DM)  mg/5 mL oral liquid, Take 5 mL by mouth 3 times a day as needed for cough for up to 10 days., Disp: 118 mL, Rfl: 0    doxycycline (Vibramycin) 100 mg capsule, Take 1 capsule (100 mg) by mouth 2 times a day for 7 days. Take with at least 8 ounces (large glass) of water, do not lie down for 30 minutes after, Disp: 14 capsule, Rfl: 0    fluticasone (Flonase) 50 mcg/actuation nasal spray, Administer 1 spray into each nostril 2 times a day for 7 days. Shake gently. Before first use, prime pump. After use, clean tip and replace cap., Disp: 16 g, Rfl: 11    fluticasone propion-salmeteroL (Advair HFA) 115-21 mcg/actuation inhaler, Inhale 2 puffs every 12 hours. (Patient not taking: Reported on 3/13/2025), Disp: 12 g, Rfl: 11    lactobacillus acidophilus capsule, Take 1 capsule by mouth once daily. (Patient not taking: Reported on 3/13/2025), Disp: , Rfl:     methocarbamol (Robaxin) 500 mg tablet, Take 1 tablet (500 mg) by mouth 4 times a day as needed for muscle spasms (muscle spasm and/or muscle pain). (Patient not taking: Reported on 3/14/2025), Disp: 60 tablet, Rfl: 0    multivit-min/vit C/herb no.124 (A[place]NE, ASCORBIC ACID, ORAL), Take 1 tablet by mouth once daily. (Patient not  "taking: Reported on 3/13/2025), Disp: , Rfl:     olopatadine (Patanol) 0.1 % ophthalmic solution, Administer 1 drop into both eyes 2 times a day as needed for allergies. (Patient not taking: Reported on 3/14/2025), Disp: , Rfl:     polyethylene glycol (Golytely) 236-22.74-6.74 -5.86 gram solution, STARTING AT 6PM DRINK HALF OF THE BOTTLE, DRINK THE OTHER HALF 5 HRS BEFORE PROCEDURE TIME (Patient not taking: Reported on 3/14/2025), Disp: 4000 mL, Rfl: 0    QUERCETIN ORAL, Take 1 capsule by mouth once daily. (Patient not taking: Reported on 3/13/2025), Disp: , Rfl:     sod chloride-sodium bicarb (Neilmed Sinus Rinse Complete) nasal rinse, Administer 1 kit into affected nostril(s) 2 times a day for 7 days., Disp: 5 each, Rfl: 0    tretinoin (Retin-A) 0.05 % cream, APPLY PEA SIZED AMOUNT TO FACE EVERY NIGHT (Patient not taking: Reported on 3/14/2025), Disp: , Rfl:     urea 40 % lotion, APPLY TO AFFECTED AREAS EVERY DAY (Patient not taking: Reported on 3/14/2025), Disp: , Rfl:     Allergies:  Allergies   Allergen Reactions    Azithromycin Unknown    Erythromycin Other    Levaquin [Levofloxacin] Other     Chest pain    Nitrofurantoin Macrocrystal Other    Nitrofurantoin Macrocrystalline Unknown     Stomach upset    Nitrofurantoin Monohyd/M-Cryst Unknown    Sulfa (Sulfonamide Antibiotics) Swelling    Sulfur Swelling       PE:  Visit Vitals  /80   Pulse 84   Ht 1.6 m (5' 3\")   Wt 72.6 kg (160 lb)   SpO2 95%   BMI 28.34 kg/m²   OB Status Postmenopausal   Smoking Status Never   BSA 1.8 m²     Physical Exam  Constitutional:       Appearance: Normal appearance.   HENT:      Head: Normocephalic and atraumatic.      Right Ear: Tympanic membrane normal.      Left Ear: Tympanic membrane normal.      Nose: Congestion and rhinorrhea present.      Mouth/Throat:      Mouth: Mucous membranes are moist.      Pharynx: Oropharynx is clear.   Eyes:      Extraocular Movements: Extraocular movements intact.      Pupils: Pupils are " equal, round, and reactive to light.   Cardiovascular:      Rate and Rhythm: Normal rate and regular rhythm.   Pulmonary:      Effort: Pulmonary effort is normal. No respiratory distress.      Breath sounds: Normal breath sounds. No wheezing, rhonchi or rales.   Abdominal:      General: There is no distension.      Palpations: Abdomen is soft.      Tenderness: There is no abdominal tenderness. There is no guarding or rebound.   Musculoskeletal:         General: Normal range of motion.   Lymphadenopathy:      Cervical: Cervical adenopathy present.   Skin:     General: Skin is warm and dry.      Capillary Refill: Capillary refill takes less than 2 seconds.      Coloration: Skin is pale.   Neurological:      General: No focal deficit present.      Mental Status: She is alert and oriented to person, place, and time. Mental status is at baseline.   Psychiatric:         Mood and Affect: Mood normal.         Behavior: Behavior normal.         Thought Content: Thought content normal.         Judgment: Judgment normal.           Assessment/Plan  Jenn Antunez is a 70 y.o. female who presents for URI symptoms for approximately a month. Patient likely has a chronic sinus infection, a prescription for doxycycline and fluticasone, codeine cough medication, and Robitussin DM was prescribed. Will follow up with patient in one week if not improved.    Jenn was seen today for uri.  Diagnoses and all orders for this visit:  Acute non-recurrent sinusitis, unspecified location (Primary)  -     doxycycline (Vibramycin) 100 mg capsule; Take 1 capsule (100 mg) by mouth 2 times a day for 7 days. Take with at least 8 ounces (large glass) of water, do not lie down for 30 minutes after  -     fluticasone (Flonase) 50 mcg/actuation nasal spray; Administer 1 spray into each nostril 2 times a day for 7 days. Shake gently. Before first use, prime pump. After use, clean tip and replace cap.  -     sod chloride-sodium bicarb (Neilmed Sinus  Rinse Complete) nasal rinse; Administer 1 kit into affected nostril(s) 2 times a day for 7 days.  -     codeine-guaifenesin (Robitussin-AC)  mg/5 mL syrup; Take 5 mL by mouth every 8 hours if needed for cough for up to 7 days.  -     dextromethorphan-guaifenesin (Robitussin DM)  mg/5 mL oral liquid; Take 5 mL by mouth 3 times a day as needed for cough for up to 10 days.         Follow up in one week if not improved.    Patient was staffed with Dr. Kishore Houston DO, PGY-3  [facility]

## 2025-04-14 NOTE — TELEPHONE ENCOUNTER
Patient has been sick of and on since January.  She recently went to the urgent care last week and they diagnosed her with Bronchitis.  They did give her Tessalon Perles she does not like those.  She said it makes her feel drugged.  She said it hurts to breath into between shoulder blade and chest. Coughing up green. She asking for something.  Concerned because she has been sick for so long.  I did ask if they did a CXR and she said that was clear.  She asked if you would order a CT Scan  Pharmacy [facility]

## 2025-04-15 ENCOUNTER — OFFICE VISIT (OUTPATIENT)
Facility: CLINIC | Age: 70
End: 2025-04-15
Payer: MEDICARE

## 2025-04-15 ENCOUNTER — TELEPHONE (OUTPATIENT)
Facility: CLINIC | Age: 70
End: 2025-04-15

## 2025-04-15 VITALS
OXYGEN SATURATION: 95 % | BODY MASS INDEX: 28.35 KG/M2 | WEIGHT: 160 LBS | SYSTOLIC BLOOD PRESSURE: 120 MMHG | HEIGHT: 63 IN | DIASTOLIC BLOOD PRESSURE: 80 MMHG | HEART RATE: 84 BPM

## 2025-04-15 DIAGNOSIS — J01.90 ACUTE NON-RECURRENT SINUSITIS, UNSPECIFIED LOCATION: Primary | ICD-10-CM

## 2025-04-15 DIAGNOSIS — R05.9 COUGH, UNSPECIFIED TYPE: Primary | ICD-10-CM

## 2025-04-15 PROCEDURE — 99214 OFFICE O/P EST MOD 30 MIN: CPT

## 2025-04-15 PROCEDURE — 3008F BODY MASS INDEX DOCD: CPT

## 2025-04-15 PROCEDURE — 1159F MED LIST DOCD IN RCRD: CPT

## 2025-04-15 RX ORDER — FLUTICASONE PROPIONATE 50 MCG
1 SPRAY, SUSPENSION (ML) NASAL 2 TIMES DAILY
Qty: 16 G | Refills: 11 | Status: SHIPPED | OUTPATIENT
Start: 2025-04-15 | End: 2025-04-22

## 2025-04-15 RX ORDER — GUAIFENESIN/DEXTROMETHORPHAN 100-10MG/5
5 SYRUP ORAL 3 TIMES DAILY PRN
Qty: 118 ML | Refills: 0 | Status: SHIPPED | OUTPATIENT
Start: 2025-04-15 | End: 2025-04-25

## 2025-04-15 RX ORDER — SOD CHLOR,BICARB/SQUEEZ BOTTLE
1 PACKET, WITH RINSE DEVICE NASAL 2 TIMES DAILY
Qty: 5 EACH | Refills: 0 | Status: SHIPPED | OUTPATIENT
Start: 2025-04-15 | End: 2025-04-22

## 2025-04-15 RX ORDER — DOXYCYCLINE 100 MG/1
100 CAPSULE ORAL 2 TIMES DAILY
Qty: 14 CAPSULE | Refills: 0 | Status: SHIPPED | OUTPATIENT
Start: 2025-04-15 | End: 2025-04-22

## 2025-04-15 RX ORDER — CODEINE PHOSPHATE AND GUAIFENESIN 10; 100 MG/5ML; MG/5ML
5 SOLUTION ORAL EVERY 8 HOURS PRN
Qty: 118 ML | Refills: 0 | Status: SHIPPED | OUTPATIENT
Start: 2025-04-15 | End: 2025-04-22

## 2025-04-15 ASSESSMENT — ENCOUNTER SYMPTOMS
TROUBLE SWALLOWING: 0
FEVER: 0
LIGHT-HEADEDNESS: 0
DIZZINESS: 0
WHEEZING: 0
VOICE CHANGE: 1
COUGH: 1
CHILLS: 0
DIARRHEA: 0
SINUS PRESSURE: 1
FATIGUE: 1
SHORTNESS OF BREATH: 0
RHINORRHEA: 1
CONSTIPATION: 0
HEADACHES: 1
VOMITING: 1
SORE THROAT: 0
SINUS PAIN: 1
NAUSEA: 1

## 2025-04-18 NOTE — PROGRESS NOTES
I reviewed and examined the patient. I was present for the key exam elements, and I fully participated in the patient's care. I discussed the management of the care with the resident. I have personally reviewed the pertinent labs and imaging, as well as recent notes, with the patient. I have reviewed the note above and agree with the resident's medical decision making as documented in the resident's note, in addition to the following comments / findings:     Agree with the rest of the plan outlined below by resident physician. No red flags.      The patient understands and agrees to the assessment and plan of care. Patient has also agreed to follow up and comply with the treatment and evaluation as recommended today. Patient was instructed to call the office at [phone] should questions arise regarding their treatment or care.     Devin Novak DO, FAOASM  Family Medicine   [address], [address]  [place] [address]     Devin Novak DO

## 2025-04-21 ENCOUNTER — APPOINTMENT (OUTPATIENT)
Dept: GASTROENTEROLOGY | Facility: EXTERNAL LOCATION | Age: 70
End: 2025-04-21
Payer: MEDICARE

## 2025-04-23 ENCOUNTER — APPOINTMENT (OUTPATIENT)
Facility: CLINIC | Age: 70
End: 2025-04-23
Payer: MEDICARE

## 2025-04-25 ENCOUNTER — LAB REQUISITION (OUTPATIENT)
Dept: LAB | Facility: HOSPITAL | Age: 70
End: 2025-04-25
Payer: MEDICARE

## 2025-04-25 ENCOUNTER — APPOINTMENT (OUTPATIENT)
Dept: GASTROENTEROLOGY | Facility: EXTERNAL LOCATION | Age: 70
End: 2025-04-25
Payer: MEDICARE

## 2025-04-25 DIAGNOSIS — D12.2 BENIGN NEOPLASM OF ASCENDING COLON: ICD-10-CM

## 2025-04-25 DIAGNOSIS — Z86.0101 HISTORY OF ADENOMATOUS POLYP OF COLON: ICD-10-CM

## 2025-04-25 DIAGNOSIS — Z12.11 COLON CANCER SCREENING: Primary | ICD-10-CM

## 2025-04-25 DIAGNOSIS — Z86.0100 HISTORY OF COLONIC POLYPS: ICD-10-CM

## 2025-04-25 DIAGNOSIS — D12.0 BENIGN NEOPLASM OF CECUM: ICD-10-CM

## 2025-04-25 PROCEDURE — 88305 TISSUE EXAM BY PATHOLOGIST: CPT | Performed by: PATHOLOGY

## 2025-04-25 PROCEDURE — 45385 COLONOSCOPY W/LESION REMOVAL: CPT | Performed by: INTERNAL MEDICINE

## 2025-04-25 PROCEDURE — 88305 TISSUE EXAM BY PATHOLOGIST: CPT

## 2025-04-25 PROCEDURE — 45380 COLONOSCOPY AND BIOPSY: CPT | Performed by: INTERNAL MEDICINE

## 2025-04-29 ENCOUNTER — APPOINTMENT (OUTPATIENT)
Dept: PULMONOLOGY | Facility: CLINIC | Age: 70
End: 2025-04-29
Payer: MEDICARE

## 2025-05-02 ENCOUNTER — APPOINTMENT (OUTPATIENT)
Dept: UROLOGY | Facility: CLINIC | Age: 70
End: 2025-05-02
Payer: MEDICARE

## 2025-05-02 VITALS — DIASTOLIC BLOOD PRESSURE: 75 MMHG | HEART RATE: 79 BPM | SYSTOLIC BLOOD PRESSURE: 123 MMHG

## 2025-05-02 DIAGNOSIS — N20.0 KIDNEY STONES: Primary | ICD-10-CM

## 2025-05-02 LAB
POC APPEARANCE, URINE: CLEAR
POC BILIRUBIN, URINE: NEGATIVE
POC BLOOD, URINE: NEGATIVE
POC COLOR, URINE: YELLOW
POC GLUCOSE, URINE: NEGATIVE MG/DL
POC KETONES, URINE: NEGATIVE MG/DL
POC LEUKOCYTES, URINE: NEGATIVE
POC NITRITE,URINE: NEGATIVE
POC PH, URINE: 6 PH
POC PROTEIN, URINE: NEGATIVE MG/DL
POC SPECIFIC GRAVITY, URINE: 1.02
POC UROBILINOGEN, URINE: 0.2 EU/DL

## 2025-05-02 PROCEDURE — 1159F MED LIST DOCD IN RCRD: CPT | Performed by: UROLOGY

## 2025-05-02 PROCEDURE — 1036F TOBACCO NON-USER: CPT | Performed by: UROLOGY

## 2025-05-02 PROCEDURE — 99203 OFFICE O/P NEW LOW 30 MIN: CPT | Performed by: UROLOGY

## 2025-05-02 PROCEDURE — 81003 URINALYSIS AUTO W/O SCOPE: CPT | Performed by: UROLOGY

## 2025-05-02 NOTE — PROGRESS NOTES
05/02/2025  70-year-old female presents an incidental finding of mild left hydronephrosis.  No pain, some urinary incontinence urgency frequency    We reviewed images study mild left hydronephrosis, no stone  We discussed urinary incontinence frequency urgency  We discussed Kegel exercise, timed voiding, possible anticholinergic medication  All the questions were answered, the patient expressed understanding and agreed to the plan.    Impression  Mild left hydronephrosis-asymptomatic  Urinary incontinence  Frequency urgency    Plan  Conservative management  Kegel exercise  Timed voiding   call if problem    Chief Complaint   Patient presents with    Nephrolithiasis     Pt is here today for possible kidney stones. She states she got a stress test and found her left kidney was blocked.        Physical Exam     TODAYS LAB RESULTS:  === 02/28/25 ===    CT ANGIO CHEST ABDOMEN PELVIS    - Impression -  No evidence of an aortic dissection or aneurysm. Mild ectasia of the  ascending aorta up to 3.8 cm. Stable mild left-sided hydronephrosis.  Hepatic steatosis.      MACRO:  None.    Signed by: Titus Gilbert 2/28/2025 8:51 AM  Dictation workstation:   [id number]    POCT UA Automated manually resulted  Order: [id number]   Status: Edited Result - FINAL       Next appt: 05/05/2025 at 08:45 AM in Allergy and Immunology ([place] [id number] [facility])       Dx: Kidney stones    Test Result Released: Yes (not seen)    0 Result Notes      Component  Ref Range & Units 08:00   POC Color, Urine  Straw, Yellow, Light-Yellow Yellow   POC Appearance, Urine  Clear Clear   POC Glucose, Urine  NEGATIVE mg/dl NEGATIVE   POC Bilirubin, Urine  NEGATIVE NEGATIVE   POC Ketones, Urine  NEGATIVE mg/dl NEGATIVE   POC Specific Gravity, Urine  1.005 - 1.035 1.025   POC Blood, Urine  NEGATIVE NEGATIVE   POC PH, Urine  No Reference Range Established PH 6.0   POC Protein, Urine  NEGATIVE mg/dl NEGATIVE   POC Urobilinogen, Urine  0.2, 1.0 EU/DL 0.2    Poc Nitrite, Urine  NEGATIVE NEGATIVE   POC Leukocytes, Urine  NEGATIVE NEGATIVE             Specimen Collected: 05/02/25 08:00 Last Resulted: 05/02/25 08:00     ASSESSMENT&PLAN:      IMPRESSIONS:

## 2025-05-02 NOTE — PATIENT INSTRUCTIONS
Impression  Mild left hydronephrosis-asymptomatic  Urinary incontinence  Frequency urgency    Plan  Conservative management  Kegel exercise  Timed voiding   call if problem

## 2025-05-05 ENCOUNTER — APPOINTMENT (OUTPATIENT)
Dept: ALLERGY | Facility: CLINIC | Age: 70
End: 2025-05-05
Payer: MEDICARE

## 2025-05-05 DIAGNOSIS — J30.89 ALLERGIC RHINITIS DUE TO OTHER ALLERGIC TRIGGER, UNSPECIFIED SEASONALITY: ICD-10-CM

## 2025-05-05 PROCEDURE — 95117 IMMUNOTHERAPY INJECTIONS: CPT | Performed by: ALLERGY & IMMUNOLOGY

## 2025-05-07 LAB
LABORATORY COMMENT REPORT: NORMAL
PATH REPORT.FINAL DX SPEC: NORMAL
PATH REPORT.GROSS SPEC: NORMAL
PATH REPORT.RELEVANT HX SPEC: NORMAL
PATH REPORT.TOTAL CANCER: NORMAL
RESIDENT REVIEW: NORMAL

## 2025-05-07 NOTE — RESULT ENCOUNTER NOTE
One of the polyps removed from your colon was an adenoma (benign but precancerous).  The second polyp removed was not precancerous.  The recommendation is to repeat colonoscopy in 3 years.      Cherise Ramirez MD

## 2025-05-20 ENCOUNTER — PATIENT OUTREACH (OUTPATIENT)
Dept: PRIMARY CARE | Facility: CLINIC | Age: 70
End: 2025-05-20

## 2025-05-20 ENCOUNTER — APPOINTMENT (OUTPATIENT)
Facility: CLINIC | Age: 70
End: 2025-05-20
Payer: MEDICARE

## 2025-05-20 VITALS
DIASTOLIC BLOOD PRESSURE: 88 MMHG | BODY MASS INDEX: 27.82 KG/M2 | HEIGHT: 63 IN | SYSTOLIC BLOOD PRESSURE: 138 MMHG | WEIGHT: 157 LBS

## 2025-05-20 DIAGNOSIS — E78.5 HYPERLIPIDEMIA, UNSPECIFIED HYPERLIPIDEMIA TYPE: ICD-10-CM

## 2025-05-20 DIAGNOSIS — M85.80 OSTEOPENIA AFTER MENOPAUSE: ICD-10-CM

## 2025-05-20 DIAGNOSIS — K21.9 GASTROESOPHAGEAL REFLUX DISEASE, UNSPECIFIED WHETHER ESOPHAGITIS PRESENT: ICD-10-CM

## 2025-05-20 DIAGNOSIS — Z78.0 OSTEOPENIA AFTER MENOPAUSE: ICD-10-CM

## 2025-05-20 DIAGNOSIS — Z00.00 MEDICARE ANNUAL WELLNESS VISIT, SUBSEQUENT: Primary | ICD-10-CM

## 2025-05-20 DIAGNOSIS — E03.9 HYPOTHYROIDISM, UNSPECIFIED TYPE: ICD-10-CM

## 2025-05-20 PROCEDURE — 3008F BODY MASS INDEX DOCD: CPT

## 2025-05-20 PROCEDURE — 1036F TOBACCO NON-USER: CPT

## 2025-05-20 PROCEDURE — 1170F FXNL STATUS ASSESSED: CPT

## 2025-05-20 PROCEDURE — G0439 PPPS, SUBSEQ VISIT: HCPCS

## 2025-05-20 PROCEDURE — 1159F MED LIST DOCD IN RCRD: CPT

## 2025-05-20 RX ORDER — PANTOPRAZOLE SODIUM 40 MG/1
40 TABLET, DELAYED RELEASE ORAL DAILY
Qty: 90 TABLET | Refills: 0 | Status: SHIPPED | OUTPATIENT
Start: 2025-05-20

## 2025-05-20 ASSESSMENT — ENCOUNTER SYMPTOMS
PALPITATIONS: 0
BLOOD IN STOOL: 0
SHORTNESS OF BREATH: 0
ABDOMINAL PAIN: 0
FEVER: 0
FATIGUE: 0
CHEST TIGHTNESS: 0
LOSS OF SENSATION IN FEET: 0
DEPRESSION: 0
OCCASIONAL FEELINGS OF UNSTEADINESS: 0
UNEXPECTED WEIGHT CHANGE: 0

## 2025-05-20 ASSESSMENT — ACTIVITIES OF DAILY LIVING (ADL)
BATHING: INDEPENDENT
TAKING_MEDICATION: INDEPENDENT
DRESSING: INDEPENDENT
GROCERY_SHOPPING: INDEPENDENT
DOING_HOUSEWORK: INDEPENDENT
MANAGING_FINANCES: INDEPENDENT

## 2025-05-20 ASSESSMENT — PATIENT HEALTH QUESTIONNAIRE - PHQ9
SUM OF ALL RESPONSES TO PHQ9 QUESTIONS 1 AND 2: 0
1. LITTLE INTEREST OR PLEASURE IN DOING THINGS: NOT AT ALL
2. FEELING DOWN, DEPRESSED OR HOPELESS: NOT AT ALL

## 2025-05-20 NOTE — PROGRESS NOTES
Patient has met target of no readmission for (90) days post hospital  discharge and is graduated from Transitional Care Management program at this time.     Encouraged to call providers for any questions concerns or change in condition

## 2025-05-20 NOTE — PROGRESS NOTES
I reviewed and examined the patient. I was present for the key exam elements, and I fully participated in the patient's care. I discussed the management of the care with the resident. I have personally reviewed the pertinent labs and imaging, as well as recent notes, with the patient. I have reviewed the note above and agree with the resident's medical decision making as documented in the resident's note, in addition to the following comments / findings:     Agree with the rest of the plan outlined below by resident physician. No red flags.      The patient understands and agrees to the assessment and plan of care. Patient has also agreed to follow up and comply with the treatment and evaluation as recommended today. Patient was instructed to call the office at [phone] should questions arise regarding their treatment or care.     Devin Novak DO, FAOASM  Family Medicine   [address], [address]  [place] [address]     Devin Novak DO

## 2025-05-20 NOTE — PROGRESS NOTES
"Subjective   Reason for Visit: Jenn Antunez is an 70 y.o. female here for a Medicare Wellness visit.       HPI  Patient presents for annual physical. Patient feeling well overall.  No acute complaints.    Preventative  - Last Mammogram 4/2024  - Last colonoscopy 4/2025 had adenoma removed, needs repeat in 3 years  - Last DEXA 3/2021, was told she had osteopenia, would like to get repeat DEXA  - Immunizations: states she does not get pneumonia or shingles vaccine    Medical hx  GERD  - On protonix  - Has been making dietary changes by switching her coffee, and is trying to get off of her PPI    Asthma  - Well controlled  - Using albuterol inhaler once a day but not every day due to seasonal allergies  - Has appointment with pulmonary in June    AILYN  - Uses dental retainer  - no current issues with sleeping    Hyperlipidemia  - does not want to go on statins  - implementing lifestyle changes     Patient Care Team:  Devin Novak DO as PCP - General (Family Medicine)  Lou Church as Care Manager (Case Management)     Review of Systems   Constitutional:  Negative for fatigue, fever and unexpected weight change.   Respiratory:  Negative for chest tightness and shortness of breath.    Cardiovascular:  Negative for chest pain and palpitations.   Gastrointestinal:  Negative for abdominal pain and blood in stool.   Genitourinary: Negative.    Skin: Negative.    Allergic/Immunologic: Positive for environmental allergies.       Objective   Vitals:  /88   Ht 1.6 m (5' 3\")   Wt 71.2 kg (157 lb)   BMI 27.81 kg/m²       Physical Exam  Constitutional:       General: She is not in acute distress.     Appearance: Normal appearance. She is not ill-appearing.   Cardiovascular:      Rate and Rhythm: Normal rate and regular rhythm.      Heart sounds: Normal heart sounds.   Pulmonary:      Effort: Pulmonary effort is normal. No respiratory distress.      Breath sounds: Normal breath sounds. No wheezing. "   Musculoskeletal:      Right lower leg: No edema.      Left lower leg: No edema.   Skin:     General: Skin is warm and dry.   Neurological:      Mental Status: She is alert and oriented to person, place, and time.   Psychiatric:         Mood and Affect: Mood normal.         Behavior: Behavior normal.         Assessment & Plan  Medicare annual wellness visit, subsequent  Jenn Antunez is a 69 yo female who presents for her annual medicare wellness visit. Routine labs and DEXA scan ordered for health maintenance and history of comorbids. Follow up in one year for annual visit or sooner if needed.          BMI 27.0-27.9,adult    Orders:    CBC and Auto Differential; Future    Comprehensive metabolic panel; Future    Osteopenia after menopause    Orders:    XR DEXA bone density; Future    Gastroesophageal reflux disease, unspecified whether esophagitis present    Orders:    pantoprazole (ProtoNix) 40 mg EC tablet; Take 1 tablet (40 mg) by mouth once daily.    Hyperlipidemia, unspecified hyperlipidemia type    Orders:    Lipid panel; Future    Hypothyroidism, unspecified type    Orders:    Tsh With Reflex To Free T4 If Abnormal; Future       Staffed with Dr. Kishore Granados DO  PM&R PGY-1    Agree with plan outlined above.  Chantal Em, DO

## 2025-06-02 ENCOUNTER — APPOINTMENT (OUTPATIENT)
Dept: ALLERGY | Facility: CLINIC | Age: 70
End: 2025-06-02
Payer: MEDICARE

## 2025-06-02 ENCOUNTER — TELEPHONE (OUTPATIENT)
Dept: OTOLARYNGOLOGY | Facility: CLINIC | Age: 70
End: 2025-06-02

## 2025-06-02 DIAGNOSIS — J30.89 ALLERGIC RHINITIS DUE TO OTHER ALLERGIC TRIGGER, UNSPECIFIED SEASONALITY: ICD-10-CM

## 2025-06-02 DIAGNOSIS — E04.1 THYROID NODULE: ICD-10-CM

## 2025-06-02 PROCEDURE — 95117 IMMUNOTHERAPY INJECTIONS: CPT | Performed by: ALLERGY & IMMUNOLOGY

## 2025-06-13 NOTE — PROGRESS NOTES
Patient is seen at the request of self for my opinion regarding Weight Management. My final recommendations will be communicated back to the requesting provider by way of shared medical record.    NEW: Virtual:  Main Antunez is a 70 y.o. female with a hx of pre-DM, GERD, asthma, HLD, hypothyroidism, AILYN, genetic open-angle Glaucoma, elongated colon, who presents for weight management and obesity.    1. Weight history: Has struggled with weight the past 3 years- has gained 20lbs.       Previous weight loss attempts: Self-directed dieting  and Weight Loss Medications: Zepbound 2.5mg- tried for 2 weeks, had to discontinue due to GI symptoms.  Tried phentermine - helped initially but effect wore off.    2. Sleep: Has AILYN      3. Stress: Stable. , has 3 adult children, retired      4. Exercise: Hiking/walking/biking- 5 miles 5 times per week       5. Appetite control: Stable  Obesity medication: N/A    Diet Recall-  Breakfast- usually skips   Lunch- 1-2pm- nuts and fruit   Dinner- salads with protein   Daily Snacks- chips/ice cream- in the evening after dinner  Beverages- 2 cups black coffee daily. Water, occasionally iced tea or pop   Alcohol- occasionally     Any personal history of pancreatitis?: No  Any personal or family history of medullary thyroid cancer or MEN (multiple endocrine neoplasia)?: No  Any history of kidney stones? Yes - 40 years ago  Any history of glaucoma? Yes - genetic narrow angle       Current Outpatient Medications:     albuterol (Ventolin HFA) 90 mcg/actuation inhaler, Inhale 2 puffs every 4 hours if needed for wheezing or shortness of breath., Disp: 36 g, Rfl: 11    cholecalciferol (Vitamin D-3) 25 MCG (1000 UT) capsule, Take 1 capsule (25 mcg) by mouth once daily., Disp: , Rfl:     fluticasone (Flonase) 50 mcg/actuation nasal spray, Administer 1 spray into each nostril 2 times a day for 7 days. Shake gently. Before first use, prime pump. After use, clean tip and replace  cap., Disp: 16 g, Rfl: 11    pantoprazole (ProtoNix) 40 mg EC tablet, Take 1 tablet (40 mg) by mouth once daily., Disp: 90 tablet, Rfl: 0    ROS:  System: normal  Eyes : no visual changes  Neck : no tenderness, no new lumps/bumps  Respiratory : no SOB  Cardiovascular : no chest pain, no palpitations  Gastro-Intestinal : no abdominal concerns  Neurological : no numbness or tingling in the extremities  Musculoskeletal : no joint paint, no muscle pain  Skin : no unusual rashes  Psychiatric : no depression, no anxiety  See HPI for Endocrine ROS    Medical History[1]    Surgical History[2]    Social History     Socioeconomic History    Marital status:      Spouse name: Not on file    Number of children: Not on file    Years of education: Not on file    Highest education level: Not on file   Occupational History    Not on file   Tobacco Use    Smoking status: Never    Smokeless tobacco: Never   Vaping Use    Vaping status: Never Used   Substance and Sexual Activity    Alcohol use: Not Currently     Comment: socially    Drug use: Never    Sexual activity: Not on file   Other Topics Concern    Not on file   Social History Narrative    Not on file     Social Drivers of Health     Financial Resource Strain: Low Risk  (2/28/2025)    Overall Financial Resource Strain (CARDIA)     Difficulty of Paying Living Expenses: Not hard at all   Food Insecurity: No Food Insecurity (2/28/2025)    Hunger Vital Sign     Worried About Running Out of Food in the Last Year: Never true     Ran Out of Food in the Last Year: Never true   Transportation Needs: No Transportation Needs (2/28/2025)    PRAPARE - Transportation     Lack of Transportation (Medical): No     Lack of Transportation (Non-Medical): No   Physical Activity: Not on file   Stress: Not on file   Social Connections: Not on file   Intimate Partner Violence: Not At Risk (2/28/2025)    Humiliation, Afraid, Rape, and Kick questionnaire     Fear of Current or Ex-Partner: No      "Emotionally Abused: No     Physically Abused: No     Sexually Abused: No   Housing Stability: Low Risk  (2/28/2025)    Housing Stability Vital Sign     Unable to Pay for Housing in the Last Year: No     Number of Times Moved in the Last Year: 0     Homeless in the Last Year: No       Objective    Physical Exam:  Height 1.6 m (5' 3\"), weight 70.8 kg (156 lb).I   Video Exam (Examination performed via Video enabled technology)  General appearance:  alert, oriented, pleasant, in NAD : yes  Ill appearing : no  Lethargic appearing : no  Respiratory distress : no    BMI: 27.63kg/m2    Assessment/Plan   Jenn Antunez is a 70 y.o. female with a hx of pre-DM, GERD, asthma, HLD, hypothyroidism, AILYN, genetic open-angle Glaucoma, elongated colon, who presents for weight management and obesity.    1. Weight Management : Reviewed the principles of energy metabolism, caloric intake and expenditure, and rationale for a treatment program.  Also reinforced need for reduced calorie, low fat diet and increased physical activity.    We reviewed the possibility of starting an interdisciplinary lifestyle intervention program involving improvement of the diet, a personalized exercise program, efforts to reduce the stress and the possibility of using appetite suppressant medications in an effort to help with the weight loss process.  The patient expressed interest in the plan.    2. Nutrition : I discussed trying one of the diet approaches we have here in the program : Mediterranean lifestyle, ketosis diet.  The patient will be referred to the Registered Dietician for education on their diet of choice.  6/16/25: 156lb, 27.63kg/m2    3. Sleep: has AILYN    4. Stress: Stable. , has 3 adult children, retired     5. Exercise: Hiking/walking/biking- 5 miles 5 times per week     6. Appetite control: high      Discussed Obesity medication: --discussed AOMS  --Glaucoma : avoid: phentermine, topamax and wellbutrin.    Self-directed dieting  " and Weight Loss Medications: Zepbound 2.5mg- tried for 2 weeks and did not tolerate due to GI side effects.  --in the past tried phentermine - wore off fast.    Follow up in a group visit.  Shellie Álvarez MD             [1]   Past Medical History:  Diagnosis Date    Personal history of other diseases of the digestive system 09/20/2013    History of esophageal reflux   [2]   Past Surgical History:  Procedure Laterality Date    OTHER SURGICAL HISTORY  11/11/2022    Appendectomy    OTHER SURGICAL HISTORY  11/11/2022    Biceps tendon rupture repair

## 2025-06-16 ENCOUNTER — TELEPHONE (OUTPATIENT)
Dept: OBSTETRICS AND GYNECOLOGY | Facility: CLINIC | Age: 70
End: 2025-06-16

## 2025-06-16 ENCOUNTER — APPOINTMENT (OUTPATIENT)
Dept: ENDOCRINOLOGY | Facility: CLINIC | Age: 70
End: 2025-06-16
Payer: MEDICARE

## 2025-06-16 VITALS — BODY MASS INDEX: 27.64 KG/M2 | HEIGHT: 63 IN | WEIGHT: 156 LBS

## 2025-06-16 DIAGNOSIS — Z12.31 VISIT FOR SCREENING MAMMOGRAM: Primary | ICD-10-CM

## 2025-06-16 DIAGNOSIS — E66.3 OVERWEIGHT: Primary | ICD-10-CM

## 2025-06-16 DIAGNOSIS — Z76.89 ENCOUNTER FOR WEIGHT MANAGEMENT: ICD-10-CM

## 2025-06-16 PROCEDURE — 3008F BODY MASS INDEX DOCD: CPT | Performed by: INTERNAL MEDICINE

## 2025-06-16 PROCEDURE — 1159F MED LIST DOCD IN RCRD: CPT | Performed by: INTERNAL MEDICINE

## 2025-06-16 PROCEDURE — 99204 OFFICE O/P NEW MOD 45 MIN: CPT | Performed by: INTERNAL MEDICINE

## 2025-06-19 ENCOUNTER — TELEMEDICINE CLINICAL SUPPORT (OUTPATIENT)
Dept: PRIMARY CARE | Facility: CLINIC | Age: 70
End: 2025-06-19
Payer: MEDICARE

## 2025-06-19 VITALS — BODY MASS INDEX: 27.63 KG/M2 | HEIGHT: 63 IN

## 2025-06-19 DIAGNOSIS — Z71.3 DIETARY COUNSELING AND SURVEILLANCE: Primary | ICD-10-CM

## 2025-06-19 DIAGNOSIS — E66.3 OVERWEIGHT: ICD-10-CM

## 2025-06-19 PROCEDURE — 97802 MEDICAL NUTRITION INDIV IN: CPT

## 2025-06-19 NOTE — PROGRESS NOTES
"Nutrition Initial Assessment:     Patient Jenn Antunez is a 70 y.o. female being seen via virtual visit, phone call only who was referred by Dr. Morales on 6/16/25 for   1. Dietary counseling and surveillance        2. Overweight  Referral to Nutrition Services      3. BMI 27.0-27.9,adult  Referral to Nutrition Services          Nutrition Assessment    Problem List[1]      Nutrition History:  Food & Nutrition History: Jenn Antunez presents via telephone. Has been IF (eats in a 5 hour window) for 21 days and has lost 10 lbs. She reports hair shedding. Has questions about if she is eating enough protein. Also wants to know how many calories and carbs are recommended for her. She is open to adjusting her eating window to be able to include more protein. Drinks Premier Protein or Fairlife shakes. States she was eating more carb foods before her last A1c was drawn. Not interested in GLP-1 medications as she wants to lose weight \"naturally\".     24 Hour Diet Recall  Meal 1 @ 2 PM: [employer] protein bar, 1 ounce nuts   Snack @ 4 PM: fruit - apple, cherries, blueberries (1 serving)   Meal 2 @ 6-6:30 PM:  chicken breast, salad with lettuce, peppers, broccoli, low jessica Italian dressing     ~55 grams protein based on diet recall     Physical Activity:   Has been more active lately     Anthropometrics:  Ht Readings from Last 1 Encounters:   06/19/25 1.6 m (5' 3\")     BMI Readings from Last 1 Encounters:   06/19/25 27.63 kg/m²     Weight History:   Daily Weight  06/16/25 : 70.8 kg (156 lb)  05/20/25 : 71.2 kg (157 lb)  04/15/25 : 72.6 kg (160 lb)  04/05/25 : 75.3 kg (166 lb)  03/14/25 : 75.7 kg (166 lb 14.2 oz)  03/13/25 : 74.4 kg (164 lb)  03/01/25 : 77.2 kg (170 lb 4.8 oz)  02/21/25 : 75 kg (165 lb 6.4 oz)  02/06/25 : 73.9 kg (163 lb)  01/31/25 : 75.8 kg (167 lb 1.7 oz)    Weight Change %:  Weight History / % Weight Change: Wt loss of 14 lbs (9%) x 3.5 months  Significant Weight Loss: Yes  Interpretation of Weight " Loss: 7.5% in 3 months    Nutrition Focused Physical Exam Findings:  Subcutaneous Fat Loss:   Defer Subcutaneous Fat Loss Assessment: Defer all  Defer All Reason: Visually appears well nourished with no signs of noticeable wasting    Muscle Wasting:  Defer Muscle Wasting Assessment: Defer all  Defer All Reason: Visually appears well nourished with no signs of noticeable wasting      Nutrition Significant Labs:  CMP Trend:    Recent Labs     03/01/25  0704 02/28/25  0759 02/13/25  0948 02/08/25  0708 02/07/25  0724 02/06/25  0058 09/19/24  2243   GLUCOSE 98 111* 115* 100*   < > 107* 119*    140 142 141   < > 141 141   K 3.8 3.9 4.4 3.4*   < > 3.5 3.7    103 105 105   < > 103 106   CO2 26 27 24 25   < > 23 25   ANIONGAP 13 14 13 14   < > 19 14   BUN 25* 28* 17 15   < > 14 21   CREATININE 0.64 0.77 0.76 0.58   < > 0.77 0.60   EGFR >90 83 84 >90   < > 83 >90   CALCIUM 8.7 9.2 9.2 8.0*   < > 9.1 8.7   ALBUMIN 3.9 4.2  --  3.8   < > 4.2 4.0   ALKPHOS  --  71  --   --   --  61 65   PROT  --  6.9  --   --   --  6.8 6.2*   AST  --  15  --   --   --  31 17   BILITOT  --  0.4  --   --   --  0.3 0.4   ALT  --  32  --   --   --  43 23    < > = values in this interval not displayed.     DM Specific Labs Trend (Includes HgbA1C, antibodies & fasting insulin):   Recent Labs     02/28/25  0759 10/18/23  0840   HGBA1C 5.8* 5.7*     Lipid Panel Trend:    Recent Labs     02/28/25  0902 10/18/23  0840 03/21/23  0600 04/05/22  1400 07/29/21  0614   CHOL 193 181 329*   < > 237*   HDL 58.6 45.1 64   < > 54.9   LDLCALC 106* 108* 248*   < >  --    LDLF  --   --   --   --  150*   VLDL 28 28  --   --  33   TRIG 141 139 86   < > 163*    < > = values in this interval not displayed.     Vitamin D:   Lab Results   Component Value Date    VITD25 62 02/13/2025        Medications:  Current Outpatient Medications   Medication Instructions    albuterol (Ventolin HFA) 90 mcg/actuation inhaler 2 puffs, inhalation, Every 4 hours PRN     cholecalciferol (VITAMIN D-3) 25 mcg, Daily    fluticasone (Flonase) 50 mcg/actuation nasal spray 1 spray, Each Nostril, 2 times daily, Shake gently. Before first use, prime pump. After use, clean tip and replace cap.    pantoprazole (PROTONIX) 40 mg, oral, Daily        Estimated Needs:  Total Energy Estimated Needs in 24 hours (kCal): 1200 kCal;   Method for Estimating Needs: MSJ 1192 x 1.2 -250 (for weight loss)  Total Protein Estimated Needs in 24 Hours (g): 70 g;   Method for Estimating 24 Hour Protein Needs: 1 g/kg   ;     ;                    Nutrition Diagnosis        Nutrition Diagnosis  Patient has Nutrition Diagnosis: Yes  Diagnosis Status (1): New  Nutrition Diagnosis 1: Food and nutrition related knowledge deficit  Related to (1): lack of adequate prior exposure to information  As Evidenced by (1): patient has questions about changing diet.  Additional Nutrition Diagnosis: Diagnosis 2  Diagnosis Status (2): New  Nutrition Diagnosis 2: Inadequate protein intake  Related to (2): food and nutrition knowledge deficit regarding protein needs  As Evidenced by (2): diet recall reflecting patient is consuming ~55g pro which is less than recommendation of 70 grams daily       Nutrition Interventions/Recommendations   Nutrition Prescription: Oral nutrition General Healthy Diet    Nutrition Interventions:   Food and Nutrient Delivery: Meals & Snacks: General Healthful Diet, Modification of schedule of oral intake  Goals: 1) Recommend 1450 calories daily for weight maintenance and 1200 calories for gradual weight loss  2) Recommend protein intake of 70 grams daily  3) Recommend 30-45 grams total cho per meal, 15-30 grams total cho per snack (2 snacks daily)  4) Encouraged patient to open up eating window to allow herself more time to incorporate  more protein and nutrient dense calories     Coordination of Care: Goals: N/A     Nutrition Education:   Content related nutrition education  Answered patient's questions  about recommendations for specific nutrients  Provided education on prediabetes and factors that influence blood glucose   Discussed that she is not meeting recommended amount of calories and protein daily which may be why she is noticing the hair thinning    Educational Handouts Provided: N/A     Nutrition Counseling:   Nutrition Counseling Strategies : Nutrition counseling based on motivational interviewing strategy, Nutrition counseling based on goal setting strategy    Patient Goals:    1) Patient will adjust her eating window to allow for more protein/calories in her diet       Readiness to Change : Good  Level of Understanding : Good  Anticipated Compliant : Good         Nutrition Monitoring and Evaluation   Food and Nutrient Intake  Monitoring and Evaluation Plan: Meal/snack pattern  Meal/Snack Pattern: Estimated meal and snack pattern  Criteria: Monitor for addition of additional protein/calories              Biochemical Data, Medical Tests and Procedures  Monitoring and Evaluation Plan: Glucose/endocrine profile  Glucose/Endocrine Profile: Hemoglobin A1c (HgbA1c)  Criteria: A1c < 5.7%         Goal Status: Goal Status: New goal identified         Follow Up: 1 month               [1]   Patient Active Problem List  Diagnosis    Achilles tendonitis    Asthma without status asthmaticus ([facility])    BRBPR (bright red blood per rectum)    Colon adenomas    Dysphagia    Fatigue    GERD (gastroesophageal reflux disease)    Heartburn    Hypothyroidism    Leukocytosis    Obstructive sleep apnea syndrome    Oral thrush    Overweight    Pyrexia    Glaucoma    HLD (hyperlipidemia)    Hypercholesterolemia    Bronchitis    Pleurisy    Squamous cell carcinoma of skin of nose    Vitamin D deficiency    Acute rhinosinusitis    Adverse effect of biological substance    Allergic rhinitis    Atrophic vaginitis    Chest pain    Chronic allergic conjunctivitis    Cough, unspecified    Subacute cough    Dyspnea on exertion     Generalized abdominal pain    Sinusitis    Achilles tendinitis    Mild intermittent asthma with exacerbation ([facility])    Flu    Hospital discharge follow-up

## 2025-06-24 ENCOUNTER — APPOINTMENT (OUTPATIENT)
Dept: OBSTETRICS AND GYNECOLOGY | Facility: CLINIC | Age: 70
End: 2025-06-24
Payer: MEDICARE

## 2025-06-24 DIAGNOSIS — E66.3 OVERWEIGHT: ICD-10-CM

## 2025-06-24 NOTE — TELEPHONE ENCOUNTER
Please advise        Per note on 6/16 patient only took two weeks of zepbound before stopping. She'd like to reinitiate. She has two pens left, but needs a new Rx as previous Rx was cancelled. Forwarded to covering nurse staff to set up the script for provider. Provider - please advise patient on constipation and water intake.

## 2025-06-26 RX ORDER — TIRZEPATIDE 2.5 MG/.5ML
2.5 INJECTION, SOLUTION SUBCUTANEOUS
Qty: 4 EACH | Refills: 1 | Status: SHIPPED | OUTPATIENT
Start: 2025-06-26

## 2025-06-27 NOTE — TELEPHONE ENCOUNTER
I can re-write for zepound.  But the side effect of constipation may not go away.  Usually its the nausea and diarrhea that does resolve, but constipation can persist.  There is NO set amount of water recommended.  In fact, over drinking water is dangerous and water should be consumed based on underlying thirst.  If you aren't thirsty, over drinking water can be dangerous.  I do suggest that she talk to her Pcp and/or GI doctor to see if there is a specific medicine that can help with the constipation (which may get worse on a GLP-1 injection).  Thanks  Shellie Álvarez MD

## 2025-06-30 ENCOUNTER — HOSPITAL ENCOUNTER (OUTPATIENT)
Dept: RADIOLOGY | Facility: HOSPITAL | Age: 70
Discharge: HOME | End: 2025-06-30
Payer: MEDICARE

## 2025-06-30 ENCOUNTER — APPOINTMENT (OUTPATIENT)
Dept: RADIOLOGY | Facility: HOSPITAL | Age: 70
End: 2025-06-30
Payer: MEDICARE

## 2025-06-30 VITALS — BODY MASS INDEX: 27.64 KG/M2 | WEIGHT: 156 LBS | HEIGHT: 63 IN

## 2025-06-30 DIAGNOSIS — Z12.31 ENCOUNTER FOR SCREENING MAMMOGRAM FOR MALIGNANT NEOPLASM OF BREAST: ICD-10-CM

## 2025-06-30 DIAGNOSIS — M85.80 OSTEOPENIA AFTER MENOPAUSE: ICD-10-CM

## 2025-06-30 DIAGNOSIS — E04.1 THYROID NODULE: ICD-10-CM

## 2025-06-30 DIAGNOSIS — Z78.0 OSTEOPENIA AFTER MENOPAUSE: ICD-10-CM

## 2025-06-30 PROCEDURE — 76536 US EXAM OF HEAD AND NECK: CPT | Performed by: RADIOLOGY

## 2025-06-30 PROCEDURE — 77067 SCR MAMMO BI INCL CAD: CPT | Performed by: STUDENT IN AN ORGANIZED HEALTH CARE EDUCATION/TRAINING PROGRAM

## 2025-06-30 PROCEDURE — 77080 DXA BONE DENSITY AXIAL: CPT

## 2025-06-30 PROCEDURE — 77063 BREAST TOMOSYNTHESIS BI: CPT | Performed by: STUDENT IN AN ORGANIZED HEALTH CARE EDUCATION/TRAINING PROGRAM

## 2025-06-30 PROCEDURE — 76536 US EXAM OF HEAD AND NECK: CPT

## 2025-06-30 PROCEDURE — 77067 SCR MAMMO BI INCL CAD: CPT

## 2025-06-30 PROCEDURE — 77080 DXA BONE DENSITY AXIAL: CPT | Performed by: STUDENT IN AN ORGANIZED HEALTH CARE EDUCATION/TRAINING PROGRAM

## 2025-07-01 ENCOUNTER — APPOINTMENT (OUTPATIENT)
Dept: ALLERGY | Facility: CLINIC | Age: 70
End: 2025-07-01
Payer: MEDICARE

## 2025-07-01 DIAGNOSIS — J30.89 ALLERGIC RHINITIS DUE TO OTHER ALLERGIC TRIGGER, UNSPECIFIED SEASONALITY: ICD-10-CM

## 2025-07-01 PROCEDURE — 95117 IMMUNOTHERAPY INJECTIONS: CPT | Performed by: ALLERGY & IMMUNOLOGY

## 2025-07-07 ENCOUNTER — APPOINTMENT (OUTPATIENT)
Dept: OBSTETRICS AND GYNECOLOGY | Facility: CLINIC | Age: 70
End: 2025-07-07
Payer: MEDICARE

## 2025-07-07 VITALS
HEIGHT: 64 IN | BODY MASS INDEX: 27.14 KG/M2 | WEIGHT: 159 LBS | DIASTOLIC BLOOD PRESSURE: 62 MMHG | SYSTOLIC BLOOD PRESSURE: 120 MMHG

## 2025-07-07 DIAGNOSIS — Z12.39 ENCOUNTER FOR BREAST CANCER SCREENING USING NON-MAMMOGRAM MODALITY: ICD-10-CM

## 2025-07-07 DIAGNOSIS — Z12.4 CERVICAL CANCER SCREENING: ICD-10-CM

## 2025-07-07 DIAGNOSIS — Z01.419 VISIT FOR PELVIC EXAM: Primary | ICD-10-CM

## 2025-07-07 DIAGNOSIS — Z78.0 MENOPAUSE: ICD-10-CM

## 2025-07-07 PROCEDURE — G0101 CA SCREEN;PELVIC/BREAST EXAM: HCPCS | Performed by: OBSTETRICS & GYNECOLOGY

## 2025-07-07 PROCEDURE — 3008F BODY MASS INDEX DOCD: CPT | Performed by: OBSTETRICS & GYNECOLOGY

## 2025-07-07 PROCEDURE — 1159F MED LIST DOCD IN RCRD: CPT | Performed by: OBSTETRICS & GYNECOLOGY

## 2025-07-07 PROCEDURE — 1036F TOBACCO NON-USER: CPT | Performed by: OBSTETRICS & GYNECOLOGY

## 2025-07-07 RX ORDER — BUDESONIDE AND FORMOTEROL FUMARATE 160; 4.5 UG/1; UG/1
2 AEROSOL, METERED RESPIRATORY (INHALATION) 2 TIMES DAILY
COMMUNITY
Start: 2025-06-27

## 2025-07-07 RX ORDER — BIMATOPROST 3 UG/ML
SOLUTION TOPICAL
COMMUNITY
Start: 2025-05-21

## 2025-07-07 RX ORDER — TRETINOIN 0.5 MG/G
CREAM TOPICAL
COMMUNITY
Start: 2025-06-05

## 2025-07-07 RX ORDER — NYSTATIN 100000 U/G
CREAM TOPICAL
COMMUNITY
Start: 2025-04-01

## 2025-07-07 ASSESSMENT — ENCOUNTER SYMPTOMS
MUSCULOSKELETAL NEGATIVE: 1
RESPIRATORY NEGATIVE: 1
CONSTITUTIONAL NEGATIVE: 1
NEUROLOGICAL NEGATIVE: 1
CARDIOVASCULAR NEGATIVE: 1
PSYCHIATRIC NEGATIVE: 1
GASTROINTESTINAL NEGATIVE: 1

## 2025-07-07 NOTE — PATIENT INSTRUCTIONS
Routine Gynecologic Visit:  You were seen today for a routine gyn visit with normal findings on exam  Your pap smear had normal cells and was negative for HPV prior to age 65, so you were not due for a pap smear today. You should still continue to get annual breast and pelvic exams in the office.  An order was placed in the system for your follow up mammogram. Please get done at your earliest convenience  If you are having any gynecologic issues in the next year you should call the office. [phone] ([place]) or [phone] ([place])

## 2025-07-07 NOTE — PROGRESS NOTES
"Subjective   Jenn Antunez is a 70 y.o. female who presents for annual exam. The patient has no complaints today. The patient is not sexually active. GYN screening history: last pap: was normal. The patient is not taking hormone replacement therapy. Patient denies post-menopausal vaginal bleeding.. The patient wears seatbelts: yes. The patient participates in regular exercise: yes. Has the patient ever been transfused or tattooed?: not asked. The patient reports that there is not domestic violence in their life.     Menstrual History:  OB History          3    Para   3    Term   3            AB        Living             SAB        IAB        Ectopic        Multiple        Live Births                    No LMP recorded. Patient is postmenopausal.         Review of Systems   Constitutional: Negative.    Respiratory: Negative.     Cardiovascular: Negative.    Gastrointestinal: Negative.    Genitourinary: Negative.    Musculoskeletal: Negative.    Skin: Negative.    Neurological: Negative.    Psychiatric/Behavioral: Negative.          Objective   /62   Ht 1.626 m (5' 4\")   Wt 72.1 kg (159 lb)   BMI 27.29 kg/m²     General:   alert and oriented, in no acute distress   Heart: regular rate and rhythm, S1, S2 normal, no murmur, click, rub or gallop   Lungs: clear to auscultation bilaterally   Abdomen: soft, non-tender, without masses or organomegaly   Pelvic Vulva normal in appearance without discoloration, rashes, lesions. Urethral meatus normal in appearance, without masses. Vagina is negative for blood, discharge, lesions. Uterus is small, mobile, non tender. There is no cervical motion tenderness, adnexal masses/tenderness. Perineum and anus with normal architecture and without rashes, lesions, discoloration.     Breast: No masses, skin changes, discoloration, tenderness, or nipple drainage bilaterally     Neck: Normal ROM. Thyroid normal size. No masses/tenderness     Lymph: No LAD   Psych: " Normal mood/affect     Lab Review      Assessment/Plan   Problem List Items Addressed This Visit    None  Visit Diagnoses         Codes      Visit for pelvic exam    -  Primary Z01.419      Cervical cancer screening     Z12.4      Encounter for breast cancer screening using non-mammogram modality     Z12.39      Menopause     Z78.0        1. Pelvic and breast exam wnl  2. Rec for mammogram given, counseled in breast awareness/self exam  3. Patient over 65 with prior normal paps x 20 years, may stop pap smear screening   4. Colonoscopy up to date  5. Patient asymptomatic without PMB. No HRT/ Osphena. Dexa up to date    RTO 1 year  Lou Santacruz, DO

## 2025-07-08 ENCOUNTER — TELEPHONE (OUTPATIENT)
Dept: ENDOCRINOLOGY | Facility: CLINIC | Age: 70
End: 2025-07-08
Payer: MEDICARE

## 2025-07-08 DIAGNOSIS — E66.3 OVERWEIGHT: ICD-10-CM

## 2025-07-08 RX ORDER — TIRZEPATIDE 2.5 MG/.5ML
2.5 INJECTION, SOLUTION SUBCUTANEOUS
Qty: 4 EACH | Refills: 1 | Status: SHIPPED | OUTPATIENT
Start: 2025-07-08

## 2025-07-08 NOTE — TELEPHONE ENCOUNTER
Received form from [facility] prior authoirzation department  Form completed and last office note faxed to [phone] with fax confirmation.

## 2025-07-10 ENCOUNTER — HOSPITAL ENCOUNTER (OUTPATIENT)
Dept: RADIOLOGY | Facility: HOSPITAL | Age: 70
Discharge: HOME | End: 2025-07-10
Payer: MEDICARE

## 2025-07-10 DIAGNOSIS — R92.8 ABNORMAL MAMMOGRAM: ICD-10-CM

## 2025-07-10 PROCEDURE — 76642 ULTRASOUND BREAST LIMITED: CPT | Mod: RIGHT SIDE | Performed by: RADIOLOGY

## 2025-07-10 PROCEDURE — 76642 ULTRASOUND BREAST LIMITED: CPT | Mod: RT

## 2025-07-10 PROCEDURE — 77061 BREAST TOMOSYNTHESIS UNI: CPT | Mod: RIGHT SIDE | Performed by: RADIOLOGY

## 2025-07-10 PROCEDURE — 77061 BREAST TOMOSYNTHESIS UNI: CPT | Mod: RT

## 2025-07-10 PROCEDURE — 77065 DX MAMMO INCL CAD UNI: CPT | Mod: RIGHT SIDE | Performed by: RADIOLOGY

## 2025-07-10 PROCEDURE — 76982 USE 1ST TARGET LESION: CPT | Mod: RT

## 2025-07-11 LAB
ALBUMIN SERPL-MCNC: 4.6 G/DL (ref 3.6–5.1)
ALP SERPL-CCNC: 63 U/L (ref 37–153)
ALT SERPL-CCNC: 21 U/L (ref 6–29)
ANION GAP SERPL CALCULATED.4IONS-SCNC: 7 MMOL/L (CALC) (ref 7–17)
AST SERPL-CCNC: 16 U/L (ref 10–35)
BASOPHILS # BLD AUTO: 31 CELLS/UL (ref 0–200)
BASOPHILS NFR BLD AUTO: 0.5 %
BILIRUB SERPL-MCNC: 0.5 MG/DL (ref 0.2–1.2)
BUN SERPL-MCNC: 21 MG/DL (ref 7–25)
CALCIUM SERPL-MCNC: 9.6 MG/DL (ref 8.6–10.4)
CHLORIDE SERPL-SCNC: 105 MMOL/L (ref 98–110)
CHOLEST SERPL-MCNC: 226 MG/DL
CHOLEST/HDLC SERPL: 3.7 (CALC)
CO2 SERPL-SCNC: 29 MMOL/L (ref 20–32)
CREAT SERPL-MCNC: 0.66 MG/DL (ref 0.6–1)
EGFRCR SERPLBLD CKD-EPI 2021: 94 ML/MIN/1.73M2
EOSINOPHIL # BLD AUTO: 161 CELLS/UL (ref 15–500)
EOSINOPHIL NFR BLD AUTO: 2.6 %
ERYTHROCYTE [DISTWIDTH] IN BLOOD BY AUTOMATED COUNT: 14.3 % (ref 11–15)
GLUCOSE SERPL-MCNC: 86 MG/DL (ref 65–99)
HCT VFR BLD AUTO: 43.2 % (ref 35–45)
HDLC SERPL-MCNC: 61 MG/DL
HGB BLD-MCNC: 13.8 G/DL (ref 11.7–15.5)
LDLC SERPL CALC-MCNC: 139 MG/DL (CALC)
LYMPHOCYTES # BLD AUTO: 2170 CELLS/UL (ref 850–3900)
LYMPHOCYTES NFR BLD AUTO: 35 %
MCH RBC QN AUTO: 29.6 PG (ref 27–33)
MCHC RBC AUTO-ENTMCNC: 31.9 G/DL (ref 32–36)
MCV RBC AUTO: 92.7 FL (ref 80–100)
MONOCYTES # BLD AUTO: 539 CELLS/UL (ref 200–950)
MONOCYTES NFR BLD AUTO: 8.7 %
NEUTROPHILS # BLD AUTO: 3298 CELLS/UL (ref 1500–7800)
NEUTROPHILS NFR BLD AUTO: 53.2 %
NONHDLC SERPL-MCNC: 165 MG/DL (CALC)
PLATELET # BLD AUTO: 305 THOUSAND/UL (ref 140–400)
PMV BLD REES-ECKER: 10.7 FL (ref 7.5–12.5)
POTASSIUM SERPL-SCNC: 4.5 MMOL/L (ref 3.5–5.3)
PROT SERPL-MCNC: 6.9 G/DL (ref 6.1–8.1)
RBC # BLD AUTO: 4.66 MILLION/UL (ref 3.8–5.1)
SODIUM SERPL-SCNC: 141 MMOL/L (ref 135–146)
TRIGL SERPL-MCNC: 134 MG/DL
TSH SERPL-ACNC: 1.28 MIU/L (ref 0.4–4.5)
WBC # BLD AUTO: 6.2 THOUSAND/UL (ref 3.8–10.8)

## 2025-07-21 ENCOUNTER — APPOINTMENT (OUTPATIENT)
Dept: ALLERGY | Facility: CLINIC | Age: 70
End: 2025-07-21
Payer: MEDICARE

## 2025-07-21 DIAGNOSIS — J30.89 ALLERGIC RHINITIS DUE TO OTHER ALLERGIC TRIGGER, UNSPECIFIED SEASONALITY: ICD-10-CM

## 2025-07-21 PROCEDURE — 95117 IMMUNOTHERAPY INJECTIONS: CPT | Performed by: ALLERGY & IMMUNOLOGY

## 2025-07-21 RX ORDER — TIRZEPATIDE 5 MG/.5ML
5 INJECTION, SOLUTION SUBCUTANEOUS
Qty: 4 EACH | Refills: 2 | Status: SHIPPED | OUTPATIENT
Start: 2025-07-21

## 2025-07-22 ENCOUNTER — APPOINTMENT (OUTPATIENT)
Dept: RADIOLOGY | Facility: HOSPITAL | Age: 70
End: 2025-07-22
Payer: MEDICARE

## 2025-07-24 ENCOUNTER — APPOINTMENT (OUTPATIENT)
Dept: PRIMARY CARE | Facility: CLINIC | Age: 70
End: 2025-07-24
Payer: MEDICARE

## 2025-08-01 NOTE — PROGRESS NOTES
Subjective  Jenn Antunez is a 70 y.o. female with a hx of pre-DM, GERD, asthma, HLD, hypothyroidism, AILYN, genetic open-angle Glaucoma, elongated colon, who presents for weight management and obesity.    NEW  Current Plan  1. Nutrition: Having either protein bar or shake for breakfast. Salad with protein for dinner. Trying to get 80 ounces or protein, water and fiber daily   Is eating less greesy foods.     2. Sleep: Stable      3. Stress: Low     4. Exercise: Signed up at the gym- going 3 days a week doing strength training.       5. Appetite control: Stable  Obesity medication: Zepbound- 2.5mg weekly      Weight trend:    Wt Readings from Last 10 Encounters:   07/07/25 72.1 kg (159 lb)   06/30/25 70.8 kg (156 lb)   06/16/25 70.8 kg (156 lb)   05/20/25 71.2 kg (157 lb)   04/15/25 72.6 kg (160 lb)   04/05/25 75.3 kg (166 lb)   03/14/25 75.7 kg (166 lb 14.2 oz)   03/13/25 74.4 kg (164 lb)   03/01/25 77.2 kg (170 lb 4.8 oz)   02/21/25 75 kg (165 lb 6.4 oz)       Current Medications[1]    ROS:  System: normal  Eyes : no visual changes  Neck : no tenderness, no new lumps/bumps  Respiratory : no SOB  Cardiovascular : no chest pain, no palpitations  Gastro-Intestinal : no abdominal concerns  Neurological : no numbness or tingling in the extremities  Musculoskeletal : no joint paint, no muscle pain  Skin : no unusual rashes  Psychiatric : no depression, no anxiety  See HPI for Endocrine ROS    Medical History[2]    Surgical History[3]    Social History     Socioeconomic History    Marital status:      Spouse name: Not on file    Number of children: Not on file    Years of education: Not on file    Highest education level: Not on file   Occupational History    Not on file   Tobacco Use    Smoking status: Never    Smokeless tobacco: Never   Vaping Use    Vaping status: Never Used   Substance and Sexual Activity    Alcohol use: Yes     Comment: socially    Drug use: Never    Sexual activity: Yes   Other Topics  Concern    Not on file   Social History Narrative    Not on file     Social Drivers of Health     Financial Resource Strain: Low Risk  (2/28/2025)    Overall Financial Resource Strain (CARDIA)     Difficulty of Paying Living Expenses: Not hard at all   Food Insecurity: No Food Insecurity (2/28/2025)    Hunger Vital Sign     Worried About Running Out of Food in the Last Year: Never true     Ran Out of Food in the Last Year: Never true   Transportation Needs: No Transportation Needs (2/28/2025)    PRAPARE - Transportation     Lack of Transportation (Medical): No     Lack of Transportation (Non-Medical): No   Physical Activity: Not on file   Stress: Not on file   Social Connections: Not on file   Intimate Partner Violence: Not At Risk (2/28/2025)    Humiliation, Afraid, Rape, and Kick questionnaire     Fear of Current or Ex-Partner: No     Emotionally Abused: No     Physically Abused: No     Sexually Abused: No   Housing Stability: Low Risk  (2/28/2025)    Housing Stability Vital Sign     Unable to Pay for Housing in the Last Year: No     Number of Times Moved in the Last Year: 0     Homeless in the Last Year: No       Objective      Physical Exam:  There were no vitals taken for this visit.  General : alert and oriented X3, no acute distress  Eyes : EOMI   BMI: 26.16kg/m2    Assessment/Plan  Jenn Antunez is a 70 y.o. female with a hx of pre-DM, GERD, asthma, HLD, hypothyroidism, AILYN, genetic open-angle Glaucoma, elongated colon, who presents for weight management and obesity.     1. Weight Management : Reviewed the principles of energy metabolism, caloric intake and expenditure, and rationale for a treatment program.  Also reinforced need for reduced calorie, low fat diet and increased physical activity.     We reviewed the possibility of starting an interdisciplinary lifestyle intervention program involving improvement of the diet, a personalized exercise program, efforts to reduce the stress and the possibility  of using appetite suppressant medications in an effort to help with the weight loss process.  The patient expressed interest in the plan.     2. Nutrition : I discussed trying one of the diet approaches we have here in the program : Mediterranean lifestyle, ketosis diet.  Met with Chani Romero RD.    6/16/25: 156lb, 27.63kg/m2  8/4/25: 152.4lb, 26.16kg/m2     3. Sleep: has AILYN     4. Stress: Stable. , has 3 adult children, retired      5. Exercise: Hiking/walking/biking- 5 miles 5 times per week      6. Appetite control: high      Discussed Obesity medication: --discussed AOMS  --Glaucoma : avoid: phentermine, topamax and wellbutrin.     Self-directed dieting  and Weight Loss Medications:  --in the past tried phentermine - wore off fast.  --on zepbound 2.5mg/wk -- will continue with this while on her vacation and then she will increase to 5mg/wk (already written for this).    Follow up in a group visit.  Shellie Álvarez MD            [1]   Current Outpatient Medications:     albuterol (Ventolin HFA) 90 mcg/actuation inhaler, Inhale 2 puffs every 4 hours if needed for wheezing or shortness of breath., Disp: 36 g, Rfl: 11    bimatoprost (Latisse) 0.03 % ophthalmic solution, APPLY ONE DROP TO AFFECTED EYELASH DAILY, Disp: , Rfl:     Breyna 160-4.5 mcg/actuation inhaler, Inhale 2 puffs 2 times a day. as instructed, Disp: , Rfl:     cholecalciferol (Vitamin D-3) 25 MCG (1000 UT) capsule, Take 1 capsule (25 mcg) by mouth once daily., Disp: , Rfl:     finasteride (Proscar) 5 mg tablet, Take 1 tablet (5 mg) by mouth early in the morning.., Disp: , Rfl:     fluticasone (Flonase) 50 mcg/actuation nasal spray, Administer 1 spray into each nostril 2 times a day for 7 days. Shake gently. Before first use, prime pump. After use, clean tip and replace cap., Disp: 16 g, Rfl: 11    nystatin (Mycostatin) cream, APPLY TWICE A DAY TO AFFECTED AREAS UNDER BREASTS X 2 WEEKS, Disp: , Rfl:     pantoprazole (ProtoNix) 40  mg EC tablet, Take 1 tablet (40 mg) by mouth once daily., Disp: 90 tablet, Rfl: 0    tirzepatide, weight loss, (Zepbound) 5 mg/0.5 mL injection, Inject 5 mg under the skin every 7 days., Disp: 4 each, Rfl: 2    tretinoin (Retin-A) 0.05 % cream, APPLY PEA SIZED AMOUNT TO FACE EVERY NIGHT, Disp: , Rfl:   [2]   Past Medical History:  Diagnosis Date    Allergic     Anemia     Arthritis     Asthma     Cancer (Multi)     Cataract     GERD (gastroesophageal reflux disease)     Glaucoma     Headache     Heart murmur     Personal history of other diseases of the digestive system 09/20/2013    History of esophageal reflux   [3]   Past Surgical History:  Procedure Laterality Date    APPENDECTOMY      OTHER SURGICAL HISTORY  11/11/2022    Appendectomy    OTHER SURGICAL HISTORY  11/11/2022    Biceps tendon rupture repair    TUBAL LIGATION

## 2025-08-04 ENCOUNTER — APPOINTMENT (OUTPATIENT)
Dept: ENDOCRINOLOGY | Facility: CLINIC | Age: 70
End: 2025-08-04
Payer: MEDICARE

## 2025-08-04 VITALS — WEIGHT: 152.4 LBS | HEIGHT: 64 IN | BODY MASS INDEX: 26.02 KG/M2

## 2025-08-04 DIAGNOSIS — Z76.89 ENCOUNTER FOR WEIGHT MANAGEMENT: ICD-10-CM

## 2025-08-04 DIAGNOSIS — E66.3 OVERWEIGHT: Primary | ICD-10-CM

## 2025-08-04 PROCEDURE — 1036F TOBACCO NON-USER: CPT | Performed by: INTERNAL MEDICINE

## 2025-08-04 PROCEDURE — 99214 OFFICE O/P EST MOD 30 MIN: CPT | Performed by: INTERNAL MEDICINE

## 2025-08-04 PROCEDURE — 1159F MED LIST DOCD IN RCRD: CPT | Performed by: INTERNAL MEDICINE

## 2025-08-04 PROCEDURE — 3008F BODY MASS INDEX DOCD: CPT | Performed by: INTERNAL MEDICINE

## 2025-09-08 ENCOUNTER — APPOINTMENT (OUTPATIENT)
Dept: ALLERGY | Facility: CLINIC | Age: 70
End: 2025-09-08
Payer: MEDICARE

## 2025-09-09 ENCOUNTER — APPOINTMENT (OUTPATIENT)
Dept: ENDOCRINOLOGY | Facility: CLINIC | Age: 70
End: 2025-09-09
Payer: MEDICARE

## 2025-09-18 ENCOUNTER — APPOINTMENT (OUTPATIENT)
Dept: ENDOCRINOLOGY | Facility: CLINIC | Age: 70
End: 2025-09-18
Payer: MEDICARE

## 2026-05-20 ENCOUNTER — APPOINTMENT (OUTPATIENT)
Facility: CLINIC | Age: 71
End: 2026-05-20
Payer: MEDICARE